# Patient Record
Sex: FEMALE | Race: WHITE | NOT HISPANIC OR LATINO | Employment: OTHER | URBAN - METROPOLITAN AREA
[De-identification: names, ages, dates, MRNs, and addresses within clinical notes are randomized per-mention and may not be internally consistent; named-entity substitution may affect disease eponyms.]

---

## 2017-02-08 ENCOUNTER — HISTORICAL (OUTPATIENT)
Dept: ADMINISTRATIVE | Facility: HOSPITAL | Age: 62
End: 2017-02-08

## 2017-05-19 ENCOUNTER — HISTORICAL (OUTPATIENT)
Dept: ADMINISTRATIVE | Facility: HOSPITAL | Age: 62
End: 2017-05-19

## 2017-05-19 LAB — TSH SERPL-ACNC: 3.65 MIU/ML (ref 0.36–3.74)

## 2017-09-18 ENCOUNTER — HISTORICAL (OUTPATIENT)
Dept: ADMINISTRATIVE | Facility: HOSPITAL | Age: 62
End: 2017-09-18

## 2017-09-18 LAB
ALBUMIN SERPL-MCNC: 3.5 GM/DL (ref 3.4–5)
ALBUMIN/GLOB SERPL: 1.1 {RATIO}
ALP SERPL-CCNC: 73 UNIT/L (ref 38–126)
ALT SERPL-CCNC: 16 UNIT/L (ref 12–78)
AST SERPL-CCNC: 14 UNIT/L (ref 15–37)
BILIRUB SERPL-MCNC: 0.3 MG/DL (ref 0.2–1)
BILIRUBIN DIRECT+TOT PNL SERPL-MCNC: 0.1 MG/DL (ref 0–0.2)
BILIRUBIN DIRECT+TOT PNL SERPL-MCNC: 0.2 MG/DL (ref 0–0.8)
BUN SERPL-MCNC: 14 MG/DL (ref 7–18)
CALCIUM SERPL-MCNC: 9 MG/DL (ref 8.5–10.1)
CHLORIDE SERPL-SCNC: 105 MMOL/L (ref 98–107)
CHOLEST SERPL-MCNC: 211 MG/DL (ref 0–200)
CHOLEST/HDLC SERPL: 2.4 {RATIO} (ref 0–4)
CO2 SERPL-SCNC: 32 MMOL/L (ref 21–32)
CREAT SERPL-MCNC: 0.7 MG/DL (ref 0.55–1.02)
DEPRECATED CALCIDIOL+CALCIFEROL SERPL-MC: 34.17 NG/ML (ref 30–80)
GLOBULIN SER-MCNC: 3.3 GM/DL (ref 2.4–3.5)
GLUCOSE SERPL-MCNC: 90 MG/DL (ref 74–106)
HDLC SERPL-MCNC: 87 MG/DL (ref 35–60)
LDLC SERPL CALC-MCNC: 112 MG/DL (ref 0–129)
POTASSIUM SERPL-SCNC: 4.6 MMOL/L (ref 3.5–5.1)
PROT SERPL-MCNC: 6.8 GM/DL (ref 6.4–8.2)
SODIUM SERPL-SCNC: 142 MMOL/L (ref 136–145)
TRIGL SERPL-MCNC: 58 MG/DL (ref 30–150)
TSH SERPL-ACNC: 3.37 MIU/ML (ref 0.36–3.74)
VLDLC SERPL CALC-MCNC: 12 MG/DL

## 2017-10-11 ENCOUNTER — HISTORICAL (OUTPATIENT)
Dept: RADIOLOGY | Facility: HOSPITAL | Age: 62
End: 2017-10-11

## 2018-03-20 ENCOUNTER — HISTORICAL (OUTPATIENT)
Dept: ADMINISTRATIVE | Facility: HOSPITAL | Age: 63
End: 2018-03-20

## 2018-03-20 LAB
CHOLEST SERPL-MCNC: 205 MG/DL (ref 0–200)
CHOLEST/HDLC SERPL: 2.1 {RATIO} (ref 0–4)
HDLC SERPL-MCNC: 99 MG/DL (ref 35–60)
LDLC SERPL CALC-MCNC: 94 MG/DL (ref 0–129)
TRIGL SERPL-MCNC: 60 MG/DL (ref 30–150)
TSH SERPL-ACNC: 1.94 MIU/ML (ref 0.36–3.74)
VLDLC SERPL CALC-MCNC: 12 MG/DL

## 2020-11-11 ENCOUNTER — HISTORICAL (OUTPATIENT)
Dept: ADMINISTRATIVE | Facility: HOSPITAL | Age: 65
End: 2020-11-11

## 2021-09-10 ENCOUNTER — HISTORICAL (OUTPATIENT)
Dept: CARDIOLOGY | Facility: HOSPITAL | Age: 66
End: 2021-09-10

## 2021-09-10 LAB
ABS NEUT (OLG): 5.54 X10(3)/MCL (ref 2.1–9.2)
BASOPHILS # BLD AUTO: 0 X10(3)/MCL (ref 0–0.2)
BASOPHILS NFR BLD AUTO: 0 %
BUN SERPL-MCNC: 15.6 MG/DL (ref 9.8–20.1)
CALCIUM SERPL-MCNC: 8.9 MG/DL (ref 8.4–10.2)
CHLORIDE SERPL-SCNC: 103 MMOL/L (ref 98–107)
CO2 SERPL-SCNC: 30 MMOL/L (ref 23–31)
CREAT SERPL-MCNC: 0.69 MG/DL (ref 0.55–1.02)
CREAT/UREA NIT SERPL: 23
EOSINOPHIL # BLD AUTO: 0.1 X10(3)/MCL (ref 0–0.9)
EOSINOPHIL NFR BLD AUTO: 2 %
ERYTHROCYTE [DISTWIDTH] IN BLOOD BY AUTOMATED COUNT: 15.1 % (ref 11.5–17)
EST CREAT CLEARANCE SER (OHS): 63.16 ML/MIN
GLUCOSE SERPL-MCNC: 102 MG/DL (ref 82–115)
HCT VFR BLD AUTO: 38.6 % (ref 37–47)
HGB BLD-MCNC: 12.3 GM/DL (ref 12–16)
LYMPHOCYTES # BLD AUTO: 1.3 X10(3)/MCL (ref 0.6–4.6)
LYMPHOCYTES NFR BLD AUTO: 18 %
MCH RBC QN AUTO: 27.8 PG (ref 27–31)
MCHC RBC AUTO-ENTMCNC: 31.9 GM/DL (ref 33–36)
MCV RBC AUTO: 87.3 FL (ref 80–94)
MONOCYTES # BLD AUTO: 0.5 X10(3)/MCL (ref 0.1–1.3)
MONOCYTES NFR BLD AUTO: 6 %
NEUTROPHILS # BLD AUTO: 5.54 X10(3)/MCL (ref 2.1–9.2)
NEUTROPHILS NFR BLD AUTO: 74 %
PLATELET # BLD AUTO: 280 X10(3)/MCL (ref 130–400)
PMV BLD AUTO: 9.4 FL (ref 9.4–12.4)
POTASSIUM SERPL-SCNC: 3.8 MMOL/L (ref 3.5–5.1)
RBC # BLD AUTO: 4.42 X10(6)/MCL (ref 4.2–5.4)
SODIUM SERPL-SCNC: 142 MMOL/L (ref 136–145)
WBC # SPEC AUTO: 7.5 X10(3)/MCL (ref 4.5–11.5)

## 2021-12-09 ENCOUNTER — HISTORICAL (OUTPATIENT)
Dept: CARDIOLOGY | Facility: HOSPITAL | Age: 66
End: 2021-12-09

## 2022-04-10 ENCOUNTER — HISTORICAL (OUTPATIENT)
Dept: ADMINISTRATIVE | Facility: HOSPITAL | Age: 67
End: 2022-04-10

## 2022-04-27 VITALS
HEIGHT: 62 IN | SYSTOLIC BLOOD PRESSURE: 139 MMHG | WEIGHT: 126.56 LBS | BODY MASS INDEX: 23.29 KG/M2 | DIASTOLIC BLOOD PRESSURE: 80 MMHG

## 2022-07-02 ENCOUNTER — HOSPITAL ENCOUNTER (INPATIENT)
Facility: HOSPITAL | Age: 67
LOS: 12 days | Discharge: NURSING, REHAB OR LONG-TERM CARE | DRG: 177 | End: 2022-07-14
Attending: EMERGENCY MEDICINE | Admitting: INTERNAL MEDICINE
Payer: MEDICARE

## 2022-07-02 DIAGNOSIS — D64.9 SYMPTOMATIC ANEMIA: ICD-10-CM

## 2022-07-02 DIAGNOSIS — U07.1 COVID-19: ICD-10-CM

## 2022-07-02 DIAGNOSIS — R06.02 SHORTNESS OF BREATH: ICD-10-CM

## 2022-07-02 DIAGNOSIS — T82.9XXA DIALYSIS COMPLICATION: ICD-10-CM

## 2022-07-02 DIAGNOSIS — N17.9 ACUTE RENAL FAILURE: Primary | ICD-10-CM

## 2022-07-02 DIAGNOSIS — N17.9 AKI (ACUTE KIDNEY INJURY): ICD-10-CM

## 2022-07-02 DIAGNOSIS — N18.6 END STAGE RENAL DISEASE: ICD-10-CM

## 2022-07-02 LAB
ABO + RH BLD: NORMAL
ABORH RETYPE: NORMAL
ALBUMIN SERPL-MCNC: 2.7 GM/DL (ref 3.4–4.8)
ALBUMIN/GLOB SERPL: 0.8 RATIO (ref 1.1–2)
ALP SERPL-CCNC: 72 UNIT/L (ref 40–150)
ALT SERPL-CCNC: 63 UNIT/L (ref 0–55)
APTT PPP: 34.5 SECONDS (ref 23.2–33.7)
AST SERPL-CCNC: 65 UNIT/L (ref 5–34)
BASOPHILS # BLD AUTO: 0.01 X10(3)/MCL (ref 0–0.2)
BASOPHILS NFR BLD AUTO: 0.1 %
BILIRUBIN DIRECT+TOT PNL SERPL-MCNC: 0.6 MG/DL
BLD PROD TYP BPU: NORMAL
BLOOD UNIT EXPIRATION DATE: NORMAL
BLOOD UNIT TYPE CODE: 7300
BNP BLD-MCNC: 226.1 PG/ML
BUN SERPL-MCNC: 95.6 MG/DL (ref 9.8–20.1)
CALCIUM SERPL-MCNC: 8.3 MG/DL (ref 8.4–10.2)
CHLORIDE SERPL-SCNC: 98 MMOL/L (ref 98–107)
CO2 SERPL-SCNC: 20 MMOL/L (ref 23–31)
CORRECTED TEMPERATURE (PCO2): 41 MMHG (ref 35–45)
CORRECTED TEMPERATURE (PH): 7.29 (ref 7.35–7.45)
CORRECTED TEMPERATURE (PO2): 52 MMHG
CREAT SERPL-MCNC: 17.52 MG/DL (ref 0.55–1.02)
CROSSMATCH INTERPRETATION: NORMAL
DISPENSE STATUS: NORMAL
EOSINOPHIL # BLD AUTO: 0.01 X10(3)/MCL (ref 0–0.9)
EOSINOPHIL NFR BLD AUTO: 0.1 %
ERYTHROCYTE [DISTWIDTH] IN BLOOD BY AUTOMATED COUNT: 16.1 % (ref 11.5–17)
FERRITIN SERPL-MCNC: 115.13 NG/ML (ref 4.63–204)
FLUAV AG UPPER RESP QL IA.RAPID: NOT DETECTED
FLUBV AG UPPER RESP QL IA.RAPID: NOT DETECTED
GLOBULIN SER-MCNC: 3.4 GM/DL (ref 2.4–3.5)
GLUCOSE SERPL-MCNC: 147 MG/DL (ref 82–115)
GROUP & RH: NORMAL
HBV SURFACE AG SERPL QL IA: NONREACTIVE
HCO3 UR-SCNC: 19.7 MMOL/L
HCT VFR BLD AUTO: 25.5 % (ref 37–47)
HGB BLD-MCNC: 7.9 GM/DL (ref 12–16)
HGB BLD-MCNC: 8.3 G/DL (ref 12–16)
IMM GRANULOCYTES # BLD AUTO: 0.08 X10(3)/MCL (ref 0–0.04)
IMM GRANULOCYTES NFR BLD AUTO: 1 %
INDIRECT COOMBS GEL: NORMAL
INR BLD: 0.96 (ref 0–1.3)
IRON SATN MFR SERPL: 11 % (ref 20–50)
IRON SERPL-MCNC: 28 UG/DL (ref 50–170)
LYMPHOCYTES # BLD AUTO: 0.63 X10(3)/MCL (ref 0.6–4.6)
LYMPHOCYTES NFR BLD AUTO: 7.7 %
MAGNESIUM SERPL-MCNC: 2.3 MG/DL (ref 1.6–2.6)
MCH RBC QN AUTO: 26.9 PG (ref 27–31)
MCHC RBC AUTO-ENTMCNC: 31 MG/DL (ref 33–36)
MCV RBC AUTO: 86.7 FL (ref 80–94)
MONOCYTES # BLD AUTO: 0.44 X10(3)/MCL (ref 0.1–1.3)
MONOCYTES NFR BLD AUTO: 5.4 %
NEUTROPHILS # BLD AUTO: 7 X10(3)/MCL (ref 2.1–9.2)
NEUTROPHILS NFR BLD AUTO: 85.7 %
NRBC BLD AUTO-RTO: 0 %
PCO2 BLDA: 41 MMHG (ref 35–45)
PH SMN: 7.29 [PH] (ref 7.35–7.45)
PLATELET # BLD AUTO: 311 X10(3)/MCL (ref 130–400)
PMV BLD AUTO: 10.1 FL (ref 7.4–10.4)
PO2 BLDA: 52 MMHG
POC BASE DEFICIT: -6.4 MMOL/L (ref -2–2)
POC COHB: 1.6 %
POC IONIZED CALCIUM: 1.06 MMOL/L (ref 1.12–1.23)
POC METHB: 0.7 % (ref 0.4–2)
POC O2HB: 80.8 % (ref 94–97)
POC SATURATED O2: 81.8 %
POC TEMPERATURE: 37 °C
POTASSIUM BLD-SCNC: 2.9 MMOL/L (ref 3.5–5)
POTASSIUM SERPL-SCNC: 3.8 MMOL/L (ref 3.5–5.1)
PROT SERPL-MCNC: 6.1 GM/DL (ref 5.8–7.6)
PROTHROMBIN TIME: 12.7 SECONDS (ref 12.5–14.5)
RBC # BLD AUTO: 2.94 X10(6)/MCL (ref 4.2–5.4)
RET# (OHS): 0.03 (ref 0.02–0.08)
RETICULOCYTE COUNT AUTOMATED (OLG): 0.91 % (ref 1.1–2.1)
SARS-COV-2 RNA RESP QL NAA+PROBE: DETECTED
SODIUM BLD-SCNC: 133 MMOL/L (ref 137–145)
SODIUM SERPL-SCNC: 138 MMOL/L (ref 136–145)
SPECIMEN SOURCE: ABNORMAL
T4 FREE SERPL-MCNC: 0.68 NG/DL (ref 0.7–1.48)
TIBC SERPL-MCNC: 230 UG/DL (ref 70–310)
TIBC SERPL-MCNC: 258 UG/DL (ref 250–450)
TRANSFERRIN SERPL-MCNC: 240 MG/DL (ref 173–360)
TROPONIN I SERPL-MCNC: 0.09 NG/ML (ref 0–0.04)
TSH SERPL-ACNC: 8.74 UIU/ML (ref 0.35–4.94)
UNIT NUMBER: NORMAL
WBC # SPEC AUTO: 8.2 X10(3)/MCL (ref 4.5–11.5)

## 2022-07-02 PROCEDURE — 36415 COLL VENOUS BLD VENIPUNCTURE: CPT | Performed by: EMERGENCY MEDICINE

## 2022-07-02 PROCEDURE — 99291 CRITICAL CARE FIRST HOUR: CPT | Mod: 25

## 2022-07-02 PROCEDURE — 93005 ELECTROCARDIOGRAM TRACING: CPT

## 2022-07-02 PROCEDURE — 85730 THROMBOPLASTIN TIME PARTIAL: CPT | Performed by: PHYSICIAN ASSISTANT

## 2022-07-02 PROCEDURE — 36556 INSERT NON-TUNNEL CV CATH: CPT | Mod: RT

## 2022-07-02 PROCEDURE — 99900035 HC TECH TIME PER 15 MIN (STAT)

## 2022-07-02 PROCEDURE — 84439 ASSAY OF FREE THYROXINE: CPT | Performed by: PHYSICIAN ASSISTANT

## 2022-07-02 PROCEDURE — 83880 ASSAY OF NATRIURETIC PEPTIDE: CPT | Performed by: PHYSICIAN ASSISTANT

## 2022-07-02 PROCEDURE — P9016 RBC LEUKOCYTES REDUCED: HCPCS | Performed by: EMERGENCY MEDICINE

## 2022-07-02 PROCEDURE — 27000221 HC OXYGEN, UP TO 24 HOURS

## 2022-07-02 PROCEDURE — 25000242 PHARM REV CODE 250 ALT 637 W/ HCPCS: Performed by: EMERGENCY MEDICINE

## 2022-07-02 PROCEDURE — S5010 5% DEXTROSE AND 0.45% SALINE: HCPCS | Performed by: EMERGENCY MEDICINE

## 2022-07-02 PROCEDURE — 11000001 HC ACUTE MED/SURG PRIVATE ROOM

## 2022-07-02 PROCEDURE — 36415 COLL VENOUS BLD VENIPUNCTURE: CPT | Performed by: PHYSICIAN ASSISTANT

## 2022-07-02 PROCEDURE — 93010 ELECTROCARDIOGRAM REPORT: CPT | Mod: ,,, | Performed by: INTERNAL MEDICINE

## 2022-07-02 PROCEDURE — 36600 WITHDRAWAL OF ARTERIAL BLOOD: CPT

## 2022-07-02 PROCEDURE — 93010 EKG 12-LEAD: ICD-10-PCS | Mod: ,,, | Performed by: INTERNAL MEDICINE

## 2022-07-02 PROCEDURE — 83540 ASSAY OF IRON: CPT | Performed by: NURSE PRACTITIONER

## 2022-07-02 PROCEDURE — 84484 ASSAY OF TROPONIN QUANT: CPT | Performed by: PHYSICIAN ASSISTANT

## 2022-07-02 PROCEDURE — 85610 PROTHROMBIN TIME: CPT | Performed by: PHYSICIAN ASSISTANT

## 2022-07-02 PROCEDURE — 86850 RBC ANTIBODY SCREEN: CPT | Performed by: EMERGENCY MEDICINE

## 2022-07-02 PROCEDURE — 94640 AIRWAY INHALATION TREATMENT: CPT

## 2022-07-02 PROCEDURE — 82803 BLOOD GASES ANY COMBINATION: CPT

## 2022-07-02 PROCEDURE — 27000207 HC ISOLATION

## 2022-07-02 PROCEDURE — 99900031 HC PATIENT EDUCATION (STAT)

## 2022-07-02 PROCEDURE — 63600175 PHARM REV CODE 636 W HCPCS: Performed by: EMERGENCY MEDICINE

## 2022-07-02 PROCEDURE — 87636 SARSCOV2 & INF A&B AMP PRB: CPT | Performed by: PHYSICIAN ASSISTANT

## 2022-07-02 PROCEDURE — 36430 TRANSFUSION BLD/BLD COMPNT: CPT

## 2022-07-02 PROCEDURE — 25000003 PHARM REV CODE 250: Performed by: EMERGENCY MEDICINE

## 2022-07-02 PROCEDURE — 87340 HEPATITIS B SURFACE AG IA: CPT | Performed by: INTERNAL MEDICINE

## 2022-07-02 PROCEDURE — 85025 COMPLETE CBC W/AUTO DIFF WBC: CPT | Performed by: PHYSICIAN ASSISTANT

## 2022-07-02 PROCEDURE — 85045 AUTOMATED RETICULOCYTE COUNT: CPT | Performed by: NURSE PRACTITIONER

## 2022-07-02 PROCEDURE — 82728 ASSAY OF FERRITIN: CPT | Performed by: NURSE PRACTITIONER

## 2022-07-02 PROCEDURE — 86920 COMPATIBILITY TEST SPIN: CPT | Performed by: EMERGENCY MEDICINE

## 2022-07-02 PROCEDURE — 84443 ASSAY THYROID STIM HORMONE: CPT | Performed by: PHYSICIAN ASSISTANT

## 2022-07-02 PROCEDURE — 83735 ASSAY OF MAGNESIUM: CPT | Performed by: PHYSICIAN ASSISTANT

## 2022-07-02 PROCEDURE — 80053 COMPREHEN METABOLIC PANEL: CPT | Performed by: PHYSICIAN ASSISTANT

## 2022-07-02 RX ORDER — AMLODIPINE BESYLATE 5 MG/1
5 TABLET ORAL DAILY
COMMUNITY
Start: 2022-06-09 | End: 2022-08-03

## 2022-07-02 RX ORDER — FLUTICASONE FUROATE AND VILANTEROL 100; 25 UG/1; UG/1
1 POWDER RESPIRATORY (INHALATION) DAILY
Status: DISCONTINUED | OUTPATIENT
Start: 2022-07-03 | End: 2022-07-14 | Stop reason: HOSPADM

## 2022-07-02 RX ORDER — SODIUM CHLORIDE 0.9 % (FLUSH) 0.9 %
10 SYRINGE (ML) INJECTION
Status: DISCONTINUED | OUTPATIENT
Start: 2022-07-02 | End: 2022-07-14 | Stop reason: HOSPADM

## 2022-07-02 RX ORDER — FLUTICASONE PROPIONATE AND SALMETEROL 250; 50 UG/1; UG/1
1 POWDER RESPIRATORY (INHALATION)
COMMUNITY

## 2022-07-02 RX ORDER — LEVOTHYROXINE SODIUM 75 UG/1
75 TABLET ORAL DAILY
COMMUNITY
Start: 2022-06-17

## 2022-07-02 RX ORDER — LIDOCAINE HYDROCHLORIDE 20 MG/ML
INJECTION, SOLUTION INFILTRATION; PERINEURAL
Status: DISPENSED
Start: 2022-07-02 | End: 2022-07-03

## 2022-07-02 RX ORDER — IPRATROPIUM BROMIDE AND ALBUTEROL SULFATE 2.5; .5 MG/3ML; MG/3ML
3 SOLUTION RESPIRATORY (INHALATION)
Status: COMPLETED | OUTPATIENT
Start: 2022-07-02 | End: 2022-07-02

## 2022-07-02 RX ORDER — TALC
6 POWDER (GRAM) TOPICAL NIGHTLY PRN
Status: DISCONTINUED | OUTPATIENT
Start: 2022-07-02 | End: 2022-07-14 | Stop reason: HOSPADM

## 2022-07-02 RX ORDER — DIAZEPAM 5 MG/1
TABLET ORAL
COMMUNITY
Start: 2022-05-10

## 2022-07-02 RX ORDER — LIDOCAINE HYDROCHLORIDE 20 MG/ML
5 INJECTION, SOLUTION EPIDURAL; INFILTRATION; INTRACAUDAL; PERINEURAL
Status: ACTIVE | OUTPATIENT
Start: 2022-07-02 | End: 2022-07-03

## 2022-07-02 RX ORDER — TIOTROPIUM BROMIDE 18 UG/1
1 CAPSULE ORAL; RESPIRATORY (INHALATION) DAILY
COMMUNITY
Start: 2022-06-17

## 2022-07-02 RX ORDER — ONDANSETRON 2 MG/ML
4 INJECTION INTRAMUSCULAR; INTRAVENOUS EVERY 8 HOURS PRN
Status: DISCONTINUED | OUTPATIENT
Start: 2022-07-02 | End: 2022-07-14 | Stop reason: HOSPADM

## 2022-07-02 RX ORDER — ALBUTEROL SULFATE 90 UG/1
1 AEROSOL, METERED RESPIRATORY (INHALATION) EVERY 6 HOURS PRN
Status: DISCONTINUED | OUTPATIENT
Start: 2022-07-02 | End: 2022-07-03

## 2022-07-02 RX ORDER — APIXABAN 5 MG/1
5 TABLET, FILM COATED ORAL 2 TIMES DAILY
COMMUNITY
Start: 2022-04-23

## 2022-07-02 RX ORDER — LISINOPRIL 20 MG/1
20 TABLET ORAL DAILY
COMMUNITY
Start: 2022-06-25

## 2022-07-02 RX ORDER — DEXAMETHASONE SODIUM PHOSPHATE 4 MG/ML
10 INJECTION, SOLUTION INTRA-ARTICULAR; INTRALESIONAL; INTRAMUSCULAR; INTRAVENOUS; SOFT TISSUE ONCE
Status: COMPLETED | OUTPATIENT
Start: 2022-07-02 | End: 2022-07-02

## 2022-07-02 RX ORDER — BUPROPION HYDROCHLORIDE 150 MG/1
150 TABLET, EXTENDED RELEASE ORAL 2 TIMES DAILY
COMMUNITY
Start: 2022-06-08

## 2022-07-02 RX ORDER — CHOLECALCIFEROL (VITAMIN D3) 1250 MCG
50000 CAPSULE ORAL
COMMUNITY
Start: 2022-05-29

## 2022-07-02 RX ORDER — ASPIRIN 81 MG/1
81 TABLET ORAL
COMMUNITY
End: 2022-08-09

## 2022-07-02 RX ORDER — CLOPIDOGREL BISULFATE 75 MG/1
75 TABLET ORAL DAILY
COMMUNITY
Start: 2022-04-09 | End: 2022-08-09

## 2022-07-02 RX ORDER — ROPINIROLE 3 MG/1
3 TABLET, FILM COATED ORAL DAILY
COMMUNITY
Start: 2022-06-07

## 2022-07-02 RX ORDER — PREDNISOLONE ACETATE 10 MG/ML
SUSPENSION/ DROPS OPHTHALMIC
Status: ON HOLD | COMMUNITY
Start: 2022-03-08 | End: 2022-07-11 | Stop reason: HOSPADM

## 2022-07-02 RX ORDER — HEPARIN SODIUM 5000 [USP'U]/ML
5000 INJECTION, SOLUTION INTRAVENOUS; SUBCUTANEOUS EVERY 8 HOURS
Status: DISCONTINUED | OUTPATIENT
Start: 2022-07-02 | End: 2022-07-03

## 2022-07-02 RX ORDER — FUROSEMIDE 20 MG/1
TABLET ORAL DAILY
COMMUNITY
Start: 2022-06-09

## 2022-07-02 RX ORDER — ROSUVASTATIN CALCIUM 40 MG/1
40 TABLET, COATED ORAL NIGHTLY
COMMUNITY
Start: 2022-06-09

## 2022-07-02 RX ORDER — BUDESONIDE AND FORMOTEROL FUMARATE DIHYDRATE 160; 4.5 UG/1; UG/1
AEROSOL RESPIRATORY (INHALATION)
COMMUNITY
Start: 2022-01-13

## 2022-07-02 RX ORDER — ALBUTEROL SULFATE 90 UG/1
AEROSOL, METERED RESPIRATORY (INHALATION)
COMMUNITY
Start: 2022-02-23

## 2022-07-02 RX ORDER — HYDROCODONE BITARTRATE AND ACETAMINOPHEN 500; 5 MG/1; MG/1
TABLET ORAL
Status: DISCONTINUED | OUTPATIENT
Start: 2022-07-02 | End: 2022-07-03

## 2022-07-02 RX ADMIN — IPRATROPIUM BROMIDE AND ALBUTEROL SULFATE 3 ML: .5; 2.5 SOLUTION RESPIRATORY (INHALATION) at 07:07

## 2022-07-02 RX ADMIN — SODIUM BICARBONATE: 84 INJECTION, SOLUTION INTRAVENOUS at 08:07

## 2022-07-02 RX ADMIN — DEXAMETHASONE SODIUM PHOSPHATE 10 MG: 4 INJECTION, SOLUTION INTRAMUSCULAR; INTRAVENOUS at 06:07

## 2022-07-02 NOTE — ED PROVIDER NOTES
Encounter Date: 7/2/2022       History   No chief complaint on file.    65 y/o F with hx HTN, HLD, COPD presents to the ED for evaluation of progressively worsening shortness of breath, generalized weakness, fatigue. Recently underwent lower extremity/venous procedure with CIS.     The history is provided by the patient and a relative.   Shortness of Breath  This is a new problem. The problem occurs continuously.The current episode started more than 2 days ago. The problem has been gradually worsening. Associated symptoms include cough and sputum production. Pertinent negatives include no fever, no chest pain, no vomiting and no abdominal pain. Associated medical issues include COPD.     Review of patient's allergies indicates:   Allergen Reactions    Oxycodone Hives     Past Medical History:   Diagnosis Date    COPD (chronic obstructive pulmonary disease)     Depression     HLD (hyperlipidemia)     Hypertension     PAD (peripheral artery disease)     PVD (peripheral vascular disease)     RLS (restless legs syndrome)      History reviewed. No pertinent surgical history.  History reviewed. No pertinent family history.     Review of Systems   Constitutional: Negative for fever.   Respiratory: Positive for cough, sputum production and shortness of breath.    Cardiovascular: Negative for chest pain.   Gastrointestinal: Negative for abdominal pain and vomiting.   All other systems reviewed and are negative.      Physical Exam     Initial Vitals [07/02/22 1448]   BP Pulse Resp Temp SpO2   (!) 154/70 81 (!) 22 97 °F (36.1 °C) (!) 92 %      MAP       --         Physical Exam    Nursing note and vitals reviewed.  Constitutional: She appears well-developed and well-nourished. She appears distressed.   HENT:   Head: Normocephalic.   Mouth/Throat: Oropharynx is clear and moist.   Eyes: Conjunctivae are normal. No scleral icterus.   Neck: Neck supple. No JVD present.   Normal range of motion.  Cardiovascular: Normal rate  and regular rhythm.   Pulmonary/Chest: She has wheezes. She has rhonchi.   Abdominal: Abdomen is soft. She exhibits no distension. There is no abdominal tenderness.   Musculoskeletal:      Cervical back: Normal range of motion and neck supple.     Neurological: She is alert and oriented to person, place, and time. GCS score is 15. GCS eye subscore is 4. GCS verbal subscore is 5. GCS motor subscore is 6.   Skin: Skin is warm and dry.         ED Course   Critical Care    Date/Time: 7/2/2022 5:00 PM  Performed by: Alvina Cristobal MD  Authorized by: Alvina Cristobal MD   Direct patient critical care time: 26 minutes  Additional history critical care time: 4 minutes  Ordering / reviewing critical care time: 6 minutes  Documentation critical care time: 5 minutes  Consulting other physicians critical care time: 4 minutes  Total critical care time (exclusive of procedural time) : 45 minutes  Critical care time was exclusive of separately billable procedures and treating other patients.  Critical care was necessary to treat or prevent imminent or life-threatening deterioration of the following conditions: metabolic crisis.  Critical care was time spent personally by me on the following activities: discussions with consultants, evaluation of patient's response to treatment, examination of patient, obtaining history from patient or surrogate, ordering and performing treatments and interventions, ordering and review of laboratory studies, ordering and review of radiographic studies, pulse oximetry and re-evaluation of patient's condition.        Labs Reviewed   COMPREHENSIVE METABOLIC PANEL - Abnormal; Notable for the following components:       Result Value    Carbon Dioxide 20 (*)     Glucose Level 147 (*)     Blood Urea Nitrogen 95.6 (*)     Creatinine 17.52 (*)     Calcium Level Total 8.3 (*)     Albumin Level 2.7 (*)     Albumin/Globulin Ratio 0.8 (*)     Alanine Aminotransferase 63 (*)     Aspartate Aminotransferase 65  (*)     All other components within normal limits   TSH - Abnormal; Notable for the following components:    Thyroid Stimulating Hormone 8.7402 (*)     All other components within normal limits   TROPONIN I - Abnormal; Notable for the following components:    Troponin-I 0.088 (*)     All other components within normal limits   T4, FREE - Abnormal; Notable for the following components:    Thyroxine Free 0.68 (*)     All other components within normal limits   B-TYPE NATRIURETIC PEPTIDE - Abnormal; Notable for the following components:    Natriuretic Peptide 226.1 (*)     All other components within normal limits   COVID/FLU A&B PCR - Abnormal; Notable for the following components:    SARS-CoV-2 PCR Detected (*)     All other components within normal limits   CBC WITH DIFFERENTIAL - Abnormal; Notable for the following components:    RBC 2.94 (*)     Hgb 7.9 (*)     Hct 25.5 (*)     MCH 26.9 (*)     MCHC 31.0 (*)     IG# 0.08 (*)     All other components within normal limits   APTT - Abnormal; Notable for the following components:    PTT 34.5 (*)     All other components within normal limits   MAGNESIUM - Normal   PROTIME-INR - Normal   CBC W/ AUTO DIFFERENTIAL    Narrative:     The following orders were created for panel order CBC Auto Differential.  Procedure                               Abnormality         Status                     ---------                               -----------         ------                     CBC with Differential[[id number]]        Abnormal            Final result                 Please view results for these tests on the individual orders.   URINALYSIS, REFLEX TO URINE CULTURE   HEPATITIS B SURFACE ANTIGEN   HEPATITIS B SURFACE ANTIBODY, QUAL/QUANT   TYPE & SCREEN   ABORH RETYPE   PREPARE RBC SOFT     EKG Readings: (Independently Interpreted)   Initial Reading: No STEMI. Rhythm: Atrial Fibrillation. Heart Rate: 75. ST Segments: Normal ST Segments. T Waves: Normal. Axis: Normal.    07/02/2022 @ 1456                US Retroperitoneal Complete (Final result)  Result time 07/02/22 19:31:48    Final result by Yan Henderson MD (07/02/22 19:31:48)                 Impression:        1. Findings suggestive of chronic bilateral medical renal disease.  2. Possible nonobstructing small right renal calcification.  3. Unremarkable sonographic appearance of the urinary bladder.      Electronically signed by: Yan Henderson  Date:    07/02/2022  Time:    19:31             Narrative:    EXAMINATION:  US RETROPERITONEAL COMPLETE    CLINICAL HISTORY:  ; acute renal failure;    TECHNIQUE:  Multiplanar grayscale sonographic images were obtained of the kidneys and urinary bladder.    COMPARISON:  11 April 2014    FINDINGS:  Exam quality: adequate for evaluation    Right Kidney: The kidney measures 13 cm x 5.5 cm.  There is diffusely heterogeneous and increased echogenicity of the renal parenchyma, suggestive of chronic medical renal disease.  No masses or complex cysts are appreciated.  No collecting system dilatation.  An echogenic focus measuring approximately 0.7 cm in maximal diameter is noted at the interpolar renal cortex, may represent calcification.  No shadowing stone is identified.  No perinephric collection or free abdominal fluid.    Left Kidney: The kidney measures 12 cm x 5.5 cm.  Diffusely heterogeneous and hyperechoic appearance of the renal tissue.  No masses or complex cysts are appreciated.  No collecting system dilatation. No shadowing calcification is identified.  No perinephric collection or free abdominal fluid.    Bladder: No convincing intraluminal abnormality.  No wall thickening or focal mural lesion.                                 X-Ray Chest 1 View (Final result)  Result time 07/02/22 15:23:43    Final result by Jacqueline jOeda MD (07/02/22 15:23:43)                 Impression:      Possible trace right pleural effusion    Basilar interstitial prominence may be related to  senescent change or volume status.      Electronically signed by: Jacqueline Ojeda  Date:    07/02/2022  Time:    15:23             Narrative:    EXAMINATION:  XR CHEST 1 VIEW    CLINICAL HISTORY:  Shortness of breath    TECHNIQUE:  Single frontal view of the chest was performed.    COMPARISON:  None    FINDINGS:  Lordotic radiograph obtained.    LINES AND TUBES: None    MEDIASTINUM AND AIMEE: The cardiac silhouette is normal. Aortic atherosclerosis. Mild prominence of the right hilum.    LUNGS: No lobar consolidation.Mild basilar interstitial prominence.      PLEURA:There is blunting of the right costophrenic sulcus.No pneumothorax.    BONES: No acute osseous abnormality.                                 Medications   0.9%  NaCl infusion (for blood administration) (has no administration in time range)   dexamethasone injection 10 mg (has no administration in time range)     Medical Decision Making:   Initial Assessment:   Ms. Bower presented for evaluation of worsening weakness, shortness of breath. Hx COPD and some wheezing on examination. Will obtain labx, COVID testing, CXR and provide symptomatic treatments.   Differential Diagnosis:   Viral syndrome, COVID-19, pneumonia, COPD with acute exacerbation, dehydration, electrolyte derangements  Independently Interpreted Test(s):   I have ordered and independently interpreted EKG Reading(s) - see prior notes  Clinical Tests:   Lab Tests: Ordered and Reviewed       <> Summary of Lab: SYLVESTER, metabolic acidosis, COVID-19+  Radiological Study: Ordered and Reviewed  Medical Tests: Ordered and Reviewed  ED Management:  COVID-19 positive. Severe acute renal failure w/ markedly elevated BUN/Cr, mild metabolic acidosis. Started on fluid resuscitation and nephrology consultation obtained. Per Dr. Rausch, started on IV bicarb gtt and requests temporary dialysis access placed tonight for possible HD tomorrow. Not a candidate for remdesivir given renal failure. Will be admitted to  hospital medicine service for further inpt management.   Other:   I have discussed this case with another health care provider.       <> Summary of the Discussion: Discussed with nephrology, Dr. Rausch: recommends bicarb gtt and temporary HD access placed tonigght for planned dialysis tomorrow    Discussed with surgery resident Dr. Miller, will place temporary dialysis access             ED Course as of 07/02/22 1843   Sat Jul 02, 2022   1738 Discussed with Dr. Rausch, rec D5 1/2 NS  w/ 2 amp bicarb @ 75 ml/hr [KS]      ED Course User Index  [KS] Alvina Cristobal MD             Clinical Impression:   Final diagnoses:  [R06.02] Shortness of breath  [N17.9] Acute renal failure (Primary)  [U07.1] COVID-19  Symptomatic anemia          ED Disposition Condition    Admit               Alvina Cristobal MD  07/05/22 1451       Alvina Cristobal MD  07/05/22 1459

## 2022-07-02 NOTE — FIRST PROVIDER EVALUATION
Medical screening exam completed.  I have conducted a focused provider triage encounter, findings are as follows:    Brief history of present illness:  66 year old female hx of a fib and copd presents to ED for confusion and shortness of breath; recent home oxygen placement 4 days ago     Vitals:    07/02/22 1448   BP: (!) 154/70   Pulse: 81   Resp: (!) 22   Temp: 97 °F (36.1 °C)   SpO2: (!) 92%       Pertinent physical exam:  Awake, alert     Brief workup plan:  Cbc, cmp, EKG, bnp, troponin, chest x-ray, covid/flu, pt/ptt, UA, mg, tsh/free t4     Preliminary workup initiated; this workup will be continued and followed by the physician or advanced practice provider that is assigned to the patient when roomed.

## 2022-07-02 NOTE — Clinical Note
The right chest and right neck was prepped. The site was prepped with ChloraPrep and Betadine. The patient was draped. The patient was positioned supine.

## 2022-07-02 NOTE — Clinical Note
The chest was prepped. The site was prepped with ChloraPrep. The patient was draped. The patient was positioned supine.

## 2022-07-02 NOTE — ED NOTES
COPD hx, placed on oxygen at home at 2liters, increased sob this week, pt is labored, increased RR with sob

## 2022-07-03 LAB
ALBUMIN SERPL-MCNC: 2.4 GM/DL (ref 3.4–4.8)
ALBUMIN/GLOB SERPL: 0.6 RATIO (ref 1.1–2)
ALP SERPL-CCNC: 66 UNIT/L (ref 40–150)
ALT SERPL-CCNC: 56 UNIT/L (ref 0–55)
APPEARANCE UR: CLEAR
AST SERPL-CCNC: 51 UNIT/L (ref 5–34)
BACTERIA #/AREA URNS AUTO: NORMAL /HPF
BILIRUB UR QL STRIP.AUTO: NEGATIVE MG/DL
BILIRUBIN DIRECT+TOT PNL SERPL-MCNC: 0.7 MG/DL
BUN SERPL-MCNC: 92.4 MG/DL (ref 9.8–20.1)
CALCIUM SERPL-MCNC: 8.3 MG/DL (ref 8.4–10.2)
CHLORIDE SERPL-SCNC: 98 MMOL/L (ref 98–107)
CK SERPL-CCNC: 607 U/L (ref 29–168)
CO2 SERPL-SCNC: 20 MMOL/L (ref 23–31)
COLOR UR AUTO: YELLOW
CREAT SERPL-MCNC: 16.37 MG/DL (ref 0.55–1.02)
CREAT UR-MCNC: 100 MG/DL (ref 47–110)
ERYTHROCYTE [SEDIMENTATION RATE] IN BLOOD: 94 MM/HR (ref 0–20)
GLOBULIN SER-MCNC: 3.9 GM/DL (ref 2.4–3.5)
GLUCOSE SERPL-MCNC: 166 MG/DL (ref 82–115)
GLUCOSE UR QL STRIP.AUTO: NEGATIVE MG/DL
KETONES UR QL STRIP.AUTO: NEGATIVE MG/DL
LACTATE SERPL-SCNC: 0.6 MMOL/L (ref 0.5–2.2)
LDH SERPL-CCNC: 449 U/L (ref 125–220)
LEUKOCYTE ESTERASE UR QL STRIP.AUTO: ABNORMAL UNIT/L
NITRITE UR QL STRIP.AUTO: NEGATIVE
PH UR STRIP.AUTO: 5.5 [PH]
POTASSIUM SERPL-SCNC: 3.5 MMOL/L (ref 3.5–5.1)
PROT SERPL-MCNC: 6.3 GM/DL (ref 5.8–7.6)
PROT UR QL STRIP.AUTO: ABNORMAL MG/DL
PROT UR STRIP-MCNC: 104.7 MG/DL
PROT/CREAT UR-RTO: 1047 MG/GM CR
RBC #/AREA URNS AUTO: <5 /HPF
RBC UR QL AUTO: ABNORMAL UNIT/L
SODIUM SERPL-SCNC: 135 MMOL/L (ref 136–145)
SODIUM UR-SCNC: 51 MMOL/L
SP GR UR STRIP.AUTO: 1.01 (ref 1–1.03)
SQUAMOUS #/AREA URNS AUTO: <5 /HPF
UROBILINOGEN UR STRIP-ACNC: 0.2 MG/DL
WBC #/AREA URNS AUTO: <5 /HPF

## 2022-07-03 PROCEDURE — 80053 COMPREHEN METABOLIC PANEL: CPT | Performed by: EMERGENCY MEDICINE

## 2022-07-03 PROCEDURE — 84300 ASSAY OF URINE SODIUM: CPT | Performed by: INTERNAL MEDICINE

## 2022-07-03 PROCEDURE — 25000242 PHARM REV CODE 250 ALT 637 W/ HCPCS: Performed by: INTERNAL MEDICINE

## 2022-07-03 PROCEDURE — 85651 RBC SED RATE NONAUTOMATED: CPT | Performed by: NURSE PRACTITIONER

## 2022-07-03 PROCEDURE — 83605 ASSAY OF LACTIC ACID: CPT | Performed by: NURSE PRACTITIONER

## 2022-07-03 PROCEDURE — 83615 LACTATE (LD) (LDH) ENZYME: CPT | Performed by: NURSE PRACTITIONER

## 2022-07-03 PROCEDURE — 80100016 HC MAINTENANCE HEMODIALYSIS

## 2022-07-03 PROCEDURE — 63600175 PHARM REV CODE 636 W HCPCS: Performed by: EMERGENCY MEDICINE

## 2022-07-03 PROCEDURE — 82550 ASSAY OF CK (CPK): CPT | Performed by: NURSE PRACTITIONER

## 2022-07-03 PROCEDURE — 25000003 PHARM REV CODE 250: Performed by: NURSE PRACTITIONER

## 2022-07-03 PROCEDURE — 63600175 PHARM REV CODE 636 W HCPCS

## 2022-07-03 PROCEDURE — 25000003 PHARM REV CODE 250: Performed by: INTERNAL MEDICINE

## 2022-07-03 PROCEDURE — 81001 URINALYSIS AUTO W/SCOPE: CPT | Performed by: PHYSICIAN ASSISTANT

## 2022-07-03 PROCEDURE — 82570 ASSAY OF URINE CREATININE: CPT | Performed by: INTERNAL MEDICINE

## 2022-07-03 PROCEDURE — 11000001 HC ACUTE MED/SURG PRIVATE ROOM

## 2022-07-03 PROCEDURE — 27000207 HC ISOLATION

## 2022-07-03 PROCEDURE — 27000221 HC OXYGEN, UP TO 24 HOURS

## 2022-07-03 PROCEDURE — 36415 COLL VENOUS BLD VENIPUNCTURE: CPT | Performed by: EMERGENCY MEDICINE

## 2022-07-03 PROCEDURE — 36415 COLL VENOUS BLD VENIPUNCTURE: CPT | Performed by: NURSE PRACTITIONER

## 2022-07-03 PROCEDURE — 63600175 PHARM REV CODE 636 W HCPCS: Performed by: STUDENT IN AN ORGANIZED HEALTH CARE EDUCATION/TRAINING PROGRAM

## 2022-07-03 PROCEDURE — 94640 AIRWAY INHALATION TREATMENT: CPT

## 2022-07-03 PROCEDURE — 63600175 PHARM REV CODE 636 W HCPCS: Performed by: NURSE PRACTITIONER

## 2022-07-03 RX ORDER — SODIUM CHLORIDE 9 MG/ML
INJECTION, SOLUTION INTRAVENOUS
Status: CANCELLED | OUTPATIENT
Start: 2022-07-03

## 2022-07-03 RX ORDER — ROPINIROLE 1 MG/1
1 TABLET, FILM COATED ORAL DAILY
Status: DISCONTINUED | OUTPATIENT
Start: 2022-07-04 | End: 2022-07-14 | Stop reason: HOSPADM

## 2022-07-03 RX ORDER — HEPARIN SODIUM 5000 [USP'U]/ML
5000 INJECTION, SOLUTION INTRAVENOUS; SUBCUTANEOUS
Status: DISCONTINUED | OUTPATIENT
Start: 2022-07-03 | End: 2022-07-14 | Stop reason: HOSPADM

## 2022-07-03 RX ORDER — ALBUTEROL SULFATE 90 UG/1
2 AEROSOL, METERED RESPIRATORY (INHALATION) EVERY 6 HOURS
Status: DISCONTINUED | OUTPATIENT
Start: 2022-07-03 | End: 2022-07-14 | Stop reason: HOSPADM

## 2022-07-03 RX ORDER — HEPARIN SODIUM 1000 [USP'U]/ML
1100 INJECTION, SOLUTION INTRAVENOUS; SUBCUTANEOUS
Status: DISCONTINUED | OUTPATIENT
Start: 2022-07-03 | End: 2022-07-03

## 2022-07-03 RX ORDER — ACETAMINOPHEN 325 MG/1
650 TABLET ORAL EVERY 8 HOURS PRN
Status: DISCONTINUED | OUTPATIENT
Start: 2022-07-03 | End: 2022-07-14 | Stop reason: HOSPADM

## 2022-07-03 RX ORDER — SODIUM CHLORIDE 450 MG/100ML
INJECTION, SOLUTION INTRAVENOUS CONTINUOUS
Status: DISCONTINUED | OUTPATIENT
Start: 2022-07-03 | End: 2022-07-03

## 2022-07-03 RX ORDER — ROPINIROLE 1 MG/1
3 TABLET, FILM COATED ORAL 3 TIMES DAILY
Status: DISCONTINUED | OUTPATIENT
Start: 2022-07-03 | End: 2022-07-03

## 2022-07-03 RX ORDER — UBIDECARENONE 30 MG
100 CAPSULE ORAL DAILY
COMMUNITY

## 2022-07-03 RX ORDER — HEPARIN 100 UNIT/ML
SYRINGE INTRAVENOUS
Status: DISCONTINUED
Start: 2022-07-03 | End: 2022-07-03 | Stop reason: WASHOUT

## 2022-07-03 RX ORDER — BENZONATATE 100 MG/1
100 CAPSULE ORAL 3 TIMES DAILY PRN
Status: DISCONTINUED | OUTPATIENT
Start: 2022-07-03 | End: 2022-07-14 | Stop reason: HOSPADM

## 2022-07-03 RX ORDER — IPRATROPIUM BROMIDE AND ALBUTEROL SULFATE 2.5; .5 MG/3ML; MG/3ML
3 SOLUTION RESPIRATORY (INHALATION)
Status: COMPLETED | OUTPATIENT
Start: 2022-07-03 | End: 2022-07-03

## 2022-07-03 RX ORDER — ROPINIROLE 1 MG/1
1 TABLET, FILM COATED ORAL 3 TIMES DAILY
Status: DISCONTINUED | OUTPATIENT
Start: 2022-07-03 | End: 2022-07-03

## 2022-07-03 RX ORDER — ROPINIROLE 1 MG/1
3 TABLET, FILM COATED ORAL NIGHTLY
Status: DISCONTINUED | OUTPATIENT
Start: 2022-07-03 | End: 2022-07-08

## 2022-07-03 RX ORDER — ROPINIROLE 1 MG/1
1 TABLET, FILM COATED ORAL DAILY
COMMUNITY

## 2022-07-03 RX ORDER — MUPIROCIN 20 MG/G
OINTMENT TOPICAL 2 TIMES DAILY
Status: DISPENSED | OUTPATIENT
Start: 2022-07-03 | End: 2022-07-08

## 2022-07-03 RX ORDER — ASCORBIC ACID 500 MG
500 TABLET ORAL 2 TIMES DAILY
Status: DISCONTINUED | OUTPATIENT
Start: 2022-07-03 | End: 2022-07-14 | Stop reason: HOSPADM

## 2022-07-03 RX ORDER — HEPARIN 100 UNIT/ML
1100 SYRINGE INTRAVENOUS ONCE
Status: DISCONTINUED | OUTPATIENT
Start: 2022-07-03 | End: 2022-07-03

## 2022-07-03 RX ORDER — LEVOFLOXACIN 500 MG/1
500 TABLET, FILM COATED ORAL DAILY
Status: COMPLETED | OUTPATIENT
Start: 2022-07-03 | End: 2022-07-07

## 2022-07-03 RX ORDER — ROPINIROLE 1 MG/1
3 TABLET, FILM COATED ORAL
Status: COMPLETED | OUTPATIENT
Start: 2022-07-03 | End: 2022-07-03

## 2022-07-03 RX ORDER — ENOXAPARIN SODIUM 100 MG/ML
1 INJECTION SUBCUTANEOUS 2 TIMES DAILY
Status: DISCONTINUED | OUTPATIENT
Start: 2022-07-03 | End: 2022-07-03

## 2022-07-03 RX ORDER — HEPARIN 100 UNIT/ML
280 SYRINGE INTRAVENOUS ONCE
Status: COMPLETED | OUTPATIENT
Start: 2022-07-03 | End: 2022-07-03

## 2022-07-03 RX ORDER — SODIUM CHLORIDE 9 MG/ML
INJECTION, SOLUTION INTRAVENOUS ONCE
Status: CANCELLED | OUTPATIENT
Start: 2022-07-03 | End: 2022-07-03

## 2022-07-03 RX ADMIN — OXYCODONE HYDROCHLORIDE AND ACETAMINOPHEN 500 MG: 500 TABLET ORAL at 10:07

## 2022-07-03 RX ADMIN — MUPIROCIN: 20 OINTMENT TOPICAL at 10:07

## 2022-07-03 RX ADMIN — IPRATROPIUM BROMIDE AND ALBUTEROL SULFATE 3 ML: .5; 3 SOLUTION RESPIRATORY (INHALATION) at 01:07

## 2022-07-03 RX ADMIN — ROPINIROLE HYDROCHLORIDE 3 MG: 1 TABLET, FILM COATED ORAL at 04:07

## 2022-07-03 RX ADMIN — HEPARIN SODIUM 5000 UNITS: 5000 INJECTION, SOLUTION INTRAVENOUS; SUBCUTANEOUS at 01:07

## 2022-07-03 RX ADMIN — HEPARIN 280 UNITS: 100 SYRINGE at 04:07

## 2022-07-03 RX ADMIN — LEVOFLOXACIN 500 MG: 500 TABLET, FILM COATED ORAL at 11:07

## 2022-07-03 RX ADMIN — SODIUM BICARBONATE: 84 INJECTION, SOLUTION INTRAVENOUS at 10:07

## 2022-07-03 RX ADMIN — MULTIPLE VITAMINS W/ MINERALS TAB 1 TABLET: TAB at 10:07

## 2022-07-03 RX ADMIN — HEPARIN SODIUM 5000 UNITS: 5000 INJECTION, SOLUTION INTRAVENOUS; SUBCUTANEOUS at 06:07

## 2022-07-03 RX ADMIN — HEPARIN SODIUM 5000 UNITS: 5000 INJECTION, SOLUTION INTRAVENOUS; SUBCUTANEOUS at 09:07

## 2022-07-03 RX ADMIN — DEXAMETHASONE 6 MG: 0.5 TABLET ORAL at 11:07

## 2022-07-03 RX ADMIN — ALBUTEROL SULFATE 2 PUFF: 90 AEROSOL, METERED RESPIRATORY (INHALATION) at 11:07

## 2022-07-03 RX ADMIN — ALBUTEROL SULFATE 2 PUFF: 90 AEROSOL, METERED RESPIRATORY (INHALATION) at 06:07

## 2022-07-03 RX ADMIN — ONDANSETRON 4 MG: 2 INJECTION INTRAMUSCULAR; INTRAVENOUS at 11:07

## 2022-07-03 RX ADMIN — ROPINIROLE HYDROCHLORIDE 3 MG: 1 TABLET, FILM COATED ORAL at 10:07

## 2022-07-03 RX ADMIN — ALBUTEROL SULFATE 2 PUFF: 90 AEROSOL, METERED RESPIRATORY (INHALATION) at 12:07

## 2022-07-03 NOTE — PROGRESS NOTES
Nutrition   Progress Note      Recommendations:  Once medically feasible, goal renal diet.       Reason for Evaluation:  Identified at risk by screening criteria    Diagnosis:    1. Shortness of breath    2. Shortness of breath    3. Acute renal failure    4. Dialysis complication        Relevant Medical History:    Past Medical History:   Diagnosis Date    COPD (chronic obstructive pulmonary disease)     Depression     HLD (hyperlipidemia)     Hypertension     PAD (peripheral artery disease)     PVD (peripheral vascular disease)     RLS (restless legs syndrome)          Nutrition Diet History:    Factors affecting nutritional intake: NPO    Food / Taoism / Culture Preferences:  none      Nutrition Prescription Ordered:    Current Diet Order: NPO    Appetite:  Other:  NPO    PO intake: NPO      Labs / Medications / Procedures:    Nutrition Related Medications: ascorbic acid, dexamethasone, MVI    Nutrition Related Labs:  7/3: Na 135, Bun 92.4, Crea 16.37, GFR 2, Gluc 166      Anthropometrics:  Height:  158cm  Admit Weight:   57.4kg  Latest Weight:   57.4kg    Wt Readings from Last 5 Encounters:   03/03/21 57.4 kg (126 lb 8.7 oz)     IBW: 50kg  %IBW: 114%  UBW: unknown at this time  %Weight Change: unknown at this time  BMI: 23  BMI classification:  Normal (BMI 18.5 - 24.9)      Nutrition Narrative:  7/3: Pt receiving dialysis at bedside during rounds in ED. Hx obtained from pt RN. RN to contact MD to order dialysis diet, currently pt is without a diet order. Pt with oxymask. NSG malnutrition screen not yet complete. Once pt diet advanced, consider adding oral nutrition supplement if PO intakes <75% of meals served.     Monitoring and Evaluation:    Nutrition Monitoring and Evaluation:  food and beverage intake    Nutrition Risk:  Level of Nutrition Risk:  Low  Frequency of Follow up:  Dietitian will f/up within 7 days.

## 2022-07-03 NOTE — NURSING
07/03/22 1300   Post-Hemodialysis Assessment   Rinseback Volume (mL) 250 mL   Blood Volume Processed (Liters) 28.7 L   Dialyzer Clearance Clear   Duration of Treatment 120 minutes   Total UF (mL) 0 mL   Patient Response to Treatment pt tolerated tx well   Post-Hemodialysis Comments pt's catheter alarms everytime she coughs, had to position her on her left side to get it to work. VSS, Net UF 0 mL.

## 2022-07-03 NOTE — H&P
[facility] - Emergency Dept    History & Physical      Patient Name: Natalia Bower  MRN: [id number]  Admission Date: 2022  Attending Physician: Artur Linares  Primary Care Provider: Nohemy Burgess MD         Patient information was obtained from ED records and patient.     Subjective:     Principal Problem: Covid 19, SOB, SYLVESTER, FVO    Chief Complaint: SOB     HPI:   This is a 67 yo female, who presented to the ED with increased sob over past 5 days. PMH COPD, Smoker, PAD, PVD, HTN, Depression, HLD, and RLS. Per family report, patient had angiogram of lower extremity Monday with Dr. Kaplan. Patient was having some SOB when she was at appointment and became worse over past couple of days. Patient was unable to get out of bed as she was so weak. Pt states that she only urinated once in the last 24 hours. Pt attempted to eat this am and had diarrhea. Pt does continue to smoke, however has not smoked in past 3 days. Pt was found to be COVID 19 + and in ARF on admit with a BUN/ CR of 95.6/17.52 respectively and Dr. Cadet has been consulted. Pt also found to be anemic with Hgb of 7.3. She will receive one unit of blood. Patient was vaccinated for COVID.  Pt being admit for SOB s/t COVID19 diagnosis, ARF and FVO.     Past Medical Hx: COPD, HTN, HLD, RLS, Depression, PAD, PVD    PSH: Stents to BLE    Family Hx - Non contributory to this process.     Social Hx; Smoker, denies ETOH and illicits      Review of patient's allergies indicates:   Allergen Reactions    Oxycodone Hives       No current facility-administered medications on file prior to encounter.     Current Outpatient Medications on File Prior to Encounter   Medication Sig    albuterol (PROVENTIL/VENTOLIN HFA) 90 mcg/actuation inhaler SMARTSI Puff(s) By Mouth Every 4 Hours PRN    amLODIPine (NORVASC) 5 MG tablet Take 5 mg by mouth once daily.    aspirin (ECOTRIN) 81 MG EC tablet Take 81 mg by mouth.    buPROPion (WELLBUTRIN  SR) 150 MG TBSR 12 hr tablet Take 150 mg by mouth 2 (two) times daily.    clopidogreL (PLAVIX) 75 mg tablet Take 75 mg by mouth once daily.    DECARA 1,250 mcg (50,000 unit) capsule Take 50,000 Units by mouth every 7 days.    diazePAM (VALIUM) 5 MG tablet SMARTSI-2 Tablet(s) By Mouth PRN    ELIQUIS 5 mg Tab Take 5 mg by mouth 2 (two) times daily.    EUTHYROX 75 mcg tablet Take 75 mcg by mouth once daily.    fluticasone-salmeterol diskus inhaler 250-50 mcg Inhale 1 puff into the lungs.    furosemide (LASIX) 20 MG tablet Take by mouth.    lisinopriL (PRINIVIL,ZESTRIL) 20 MG tablet Take 20 mg by mouth once daily.    prednisoLONE acetate (PRED FORTE) 1 % DrpS Place into both eyes.    rOPINIRole (REQUIP) 3 MG tablet Take 3 mg by mouth once daily.    rosuvastatin (CRESTOR) 40 MG Tab Take 40 mg by mouth nightly.    SPIRIVA WITH HANDIHALER 18 mcg inhalation capsule 1 capsule once daily.    SYMBICORT 160-4.5 mcg/actuation HFAA SMARTSI Puff(s) By Mouth Twice Daily     Family History    None       Tobacco Use    Smoking status: Not on file    Smokeless tobacco: Not on file   Substance and Sexual Activity    Alcohol use: Not on file    Drug use: Not on file    Sexual activity: Not on file     ROS -   Generalized - weakness, poor intake, fatigue,   SOB at rest  Denies abd pain  Urinated x 1 this am    Objective:     Vital Signs (Most Recent):  Temp: 97.9 °F (36.6 °C) (22)  Pulse: 77 (22)  Resp: (!) 26 (22)  BP: 116/82 (22)  SpO2: 96 % (22) Vital Signs (24h Range):  Temp:  [97 °F (36.1 °C)-97.9 °F (36.6 °C)] 97.9 °F (36.6 °C)  Pulse:  [73-81] 77  Resp:  [22-29] 26  SpO2:  [92 %-96 %] 96 %  BP: (111-154)/(64-88) 116/82        There is no height or weight on file to calculate BMI.    Generalized - ill appearing female    HEENT - NC AT PERRLA  CVA - S1 S2, IRR  Resp - bilateral diminished, rhonchi, with mild exp wheeze, use of accessory muscles  Abd-  soft, NT ND BS +  EXT - trace BLE edema  Neuro - II - XII Grossly intact      Significant Labs:    Carbon Dioxide 20 (*)       Glucose Level 147 (*)       Blood Urea Nitrogen 95.6 (*)       Creatinine 17.52 (*)       Calcium Level Total 8.3 (*)       Albumin Level 2.7 (*)       Albumin/Globulin Ratio 0.8 (*)       Alanine Aminotransferase 63 (*)       Aspartate Aminotransferase 65 (*)       All other components within normal limits   TSH - Abnormal; Notable for the following components:     Thyroid Stimulating Hormone 8.7402 (*)       All other components within normal limits   TROPONIN I - Abnormal; Notable for the following components:     Troponin-I 0.088 (*)       All other components within normal limits   T4, FREE - Abnormal; Notable for the following components:     Thyroxine Free 0.68 (*)       All other components within normal limits   B-TYPE NATRIURETIC PEPTIDE - Abnormal; Notable for the following components:     Natriuretic Peptide 226.1 (*)       All other components within normal limits   COVID/FLU A&B PCR - Abnormal; Notable for the following components:     SARS-CoV-2 PCR Detected (*)       All other components within normal limits   CBC WITH DIFFERENTIAL - Abnormal; Notable for the following components:     RBC 2.94 (*)       Hgb 7.9 (*)       Hct 25.5 (*)       MCH 26.9 (*)       MCHC 31.0 (*)       IG# 0.08 (*)       All other components within normal limits   APTT - Abnormal; Notable for the following components:     PTT 34.5 (*)          Significant Imaging:    Carbon Dioxide 20 (*)       Glucose Level 147 (*)       Blood Urea Nitrogen 95.6 (*)       Creatinine 17.52 (*)       Calcium Level Total 8.3 (*)       Albumin Level 2.7 (*)       Albumin/Globulin Ratio 0.8 (*)       Alanine Aminotransferase 63 (*)       Aspartate Aminotransferase 65 (*)       All other components within normal limits   TSH - Abnormal; Notable for the following components:     Thyroid Stimulating Hormone 8.7402 (*)        All other components within normal limits   TROPONIN I - Abnormal; Notable for the following components:     Troponin-I 0.088 (*)       All other components within normal limits   T4, FREE - Abnormal; Notable for the following components:     Thyroxine Free 0.68 (*)       All other components within normal limits   B-TYPE NATRIURETIC PEPTIDE - Abnormal; Notable for the following components:     Natriuretic Peptide 226.1 (*)       All other components within normal limits   COVID/FLU A&B PCR - Abnormal; Notable for the following components:     SARS-CoV-2 PCR Detected (*)       All other components within normal limits   CBC WITH DIFFERENTIAL - Abnormal; Notable for the following components:     RBC 2.94 (*)       Hgb 7.9 (*)       Hct 25.5 (*)       MCH 26.9 (*)       MCHC 31.0 (*)       IG# 0.08 (*)       All other components within normal limits   APTT - Abnormal; Notable for the following components:     PTT 34.5 (*)    EXAMINATION:  XR CHEST 1 VIEW     CLINICAL HISTORY:  Shortness of breath     TECHNIQUE:  Single frontal view of the chest was performed.     COMPARISON:  None     FINDINGS:  Lordotic radiograph obtained.     LINES AND TUBES: None     MEDIASTINUM AND AIMEE: The cardiac silhouette is normal. Aortic atherosclerosis. Mild prominence of the right hilum.     LUNGS: No lobar consolidation.Mild basilar interstitial prominence.        PLEURA:There is blunting of the right costophrenic sulcus.No pneumothorax.     BONES: No acute osseous abnormality.    Assessment/Plan:     There are no hospital problems to display for this patient.    VTE Risk Mitigation (From admission, onward)    None        IMPRESSION/PLAN  1. SOB   Titrate oxygen to maintain sat >92%  2. COVID 19 +   Supportive therapy - unable to receive Remdesivir s/t to renal fx   3. SYLVESTER   Dr. Rausch - renal consulted - fluids per renal  4. Anemia   Transfusing 1 unit blood   Check stool focb   Iron studies  5. COPD - Smoker  6. FVO  7. A fib   Heparin  - 5000 sq q8    Thank you for allowing us to participate in the care of this patient    SAY Sparks as scribe for Dr. Artur Ross  Department of Hospital Medicine   [facility] - Emergency Dept

## 2022-07-03 NOTE — PROGRESS NOTES
Internal Medicine Progress Note    Subjective/Interval History:  This is a 65 yo female, who presented to the ED with increased sob over past 5 days. PMH COPD, Smoker, PAD, PVD, HTN, Depression, HLD, and RLS. Per family report, patient had angiogram of lower extremity Monday with Dr. Kaplan. Patient was having some SOB when she was at appointment and became worse over past couple of days. Patient was unable to get out of bed as she was so weak. Pt states that she only urinated once in the last 24 hours. Pt attempted to eat this am and had diarrhea. Pt does continue to smoke, however has not smoked in past 3 days. Pt was found to be COVID 19 + and in ARF on admit with a BUN/ CR of 95.6/17.52 respectively and Dr. Cadet has been consulted. Pt also found to be anemic with Hgb of 7.3. She will receive one unit of blood. Patient was vaccinated for COVID.  Pt being admit for SOB s/t COVID19 diagnosis, ARF and FVO.     7-3-22  Pt with some increased SOB during HD  Did urinate x 1 today  Labs essentially unchanged  Remains with productive cough  On 5 liters O2 sating 90%  Issues with her RLS today    ROS:  Generalized - weakness, poor intake, fatigue,   SOB at rest  Denies abd pain  Urinated x 1 this am    Vital Signs:  /73   Pulse 92   Temp 98.1 °F (36.7 °C) (Oral)   Resp (!) 36   SpO2 96%   There is no height or weight on file to calculate BMI.    Physical Exam:  Generalized - ill appearing female    HEENT - [place] AT [facility]  CVA - S1 S2, IRR  Resp - bilateral diminished, rhonchi, with mild exp wheeze, use of accessory muscles  Abd- soft, NT ND BS +  EXT - trace BLE edema  Neuro - II - XII Grossly intact  HD Cath to [facility]    Labs:  Recent Results (from the past 48 hour(s))   COVID/FLU A&B PCR    Collection Time: 07/02/22  3:11 PM   Result Value Ref Range    Influenza A PCR Not Detected Not Detected    Influenza B PCR Not Detected Not Detected    SARS-CoV-2 PCR Detected (A) Not Detected   Comprehensive Metabolic Panel     Collection Time: 07/02/22  3:23 PM   Result Value Ref Range    Sodium Level 138 136 - 145 mmol/L    Potassium Level 3.8 3.5 - 5.1 mmol/L    Chloride 98 98 - 107 mmol/L    Carbon Dioxide 20 (L) 23 - 31 mmol/L    Glucose Level 147 (H) 82 - 115 mg/dL    Blood Urea Nitrogen 95.6 (H) 9.8 - 20.1 mg/dL    Creatinine 17.52 (H) 0.55 - 1.02 mg/dL    Calcium Level Total 8.3 (L) 8.4 - 10.2 mg/dL    Protein Total 6.1 5.8 - 7.6 gm/dL    Albumin Level 2.7 (L) 3.4 - 4.8 gm/dL    Globulin 3.4 2.4 - 3.5 gm/dL    Albumin/Globulin Ratio 0.8 (L) 1.1 - 2.0 ratio    Bilirubin Total 0.6 <=1.5 mg/dL    Alkaline Phosphatase 72 40 - 150 unit/L    Alanine Aminotransferase 63 (H) 0 - 55 unit/L    Aspartate Aminotransferase 65 (H) 5 - 34 unit/L    Estimated GFR-Non  2 mls/min/1.73/m2   TSH    Collection Time: 07/02/22  3:23 PM   Result Value Ref Range    Thyroid Stimulating Hormone 8.7402 (H) 0.3500 - 4.9400 uIU/mL   Troponin I    Collection Time: 07/02/22  3:23 PM   Result Value Ref Range    Troponin-I 0.088 (H) 0.000 - 0.045 ng/mL   T4, Free    Collection Time: 07/02/22  3:23 PM   Result Value Ref Range    Thyroxine Free 0.68 (L) 0.70 - 1.48 ng/dL   BNP    Collection Time: 07/02/22  3:23 PM   Result Value Ref Range    Natriuretic Peptide 226.1 (H) <=100.0 pg/mL   Magnesium    Collection Time: 07/02/22  3:23 PM   Result Value Ref Range    Magnesium Level 2.30 1.60 - 2.60 mg/dL   CBC with Differential    Collection Time: 07/02/22  3:23 PM   Result Value Ref Range    WBC 8.2 4.5 - 11.5 x10(3)/mcL    RBC 2.94 (L) 4.20 - 5.40 x10(6)/mcL    Hgb 7.9 (L) 12.0 - 16.0 gm/dL    Hct 25.5 (L) 37.0 - 47.0 %    MCV 86.7 80.0 - 94.0 fL    MCH 26.9 (L) 27.0 - 31.0 pg    MCHC 31.0 (L) 33.0 - 36.0 mg/dL    RDW 16.1 11.5 - 17.0 %    Platelet 311 130 - 400 x10(3)/mcL    MPV 10.1 7.4 - 10.4 fL    Neut % 85.7 %    Lymph % 7.7 %    Mono % 5.4 %    Eos % 0.1 %    Basophil % 0.1 %    Lymph # 0.63 0.6 - 4.6 x10(3)/mcL    Neut # 7.0 2.1 - 9.2  x10(3)/mcL    Mono # 0.44 0.1 - 1.3 x10(3)/mcL    Eos # 0.01 0 - 0.9 x10(3)/mcL    Baso # 0.01 0 - 0.2 x10(3)/mcL    IG# 0.08 (H) 0 - 0.04 x10(3)/mcL    IG% 1.0 %    NRBC% 0.0 %   Protime-INR    Collection Time: 07/02/22  3:23 PM   Result Value Ref Range    PT 12.7 12.5 - 14.5 seconds    INR 0.96 0.00 - 1.30   APTT    Collection Time: 07/02/22  3:23 PM   Result Value Ref Range    PTT 34.5 (H) 23.2 - 33.7 seconds   Hepatitis B Surface Antigen    Collection Time: 07/02/22  3:23 PM   Result Value Ref Range    Hepatitis B Surface Antigen Nonreactive Nonreactive   Prepare RBC 1 Unit    Collection Time: 07/02/22  5:39 PM   Result Value Ref Range    UNIT NUMBER [id number]     UNIT ABO/RH B POS     DISPENSE STATUS Transfused     Unit Expiration [id number]     Product Code R7084D34     Unit Blood Type Code 7300     CROSSMATCH INTERPRETATION Compatible    Type & Screen    Collection Time: 07/02/22  5:39 PM   Result Value Ref Range    Group & Rh B POS     Indirect Chip GEL NEG    POCT ARTERIAL BLOOD GAS    Collection Time: 07/02/22  6:13 PM   Result Value Ref Range    POC PH 7.29 (A) 7.35 - 7.45    POC PCO2 41 35 - 45 mmHg    POC PO2 52 (AA) 55 - mmHg    POC HEMOGLOBIN 8.3 (A) 12 - 16 g/dL    POC SATURATED O2 81.8 %    POC O2Hb 80.8 (A) 94.0 - 97.0 %    POC COHb 1.6 %    POC MetHb 0.7 0.4 - 2 %    POC Potassium 2.9 (A) 3.5 - 5.0 mmol/l    POC Sodium 133 (A) 137 - 145 mmol/l    POC Ionized Calcium 1.06 (A) 1.12 - 1.23 mmol/l    Correct Temperature (PH) 7.29 (A) 7.35 - 7.45    Corrected Temperature (pCO2) 41 35 - 45 mmHg    Corrected Temperature (pO2) 52 (AA) 55 - mmHg    POC HCO3 19.7 mmol/l    Base Deficit -6.4 (A) -2.0 - 2.0 mmol/l    POC Temp 37.0 °C    Specimen source Arterial sample    ABORH RETYPE    Collection Time: 07/02/22  6:42 PM   Result Value Ref Range    ABORH Retype B POS    Iron and TIBC    Collection Time: 07/02/22 10:30 PM   Result Value Ref Range    Iron Binding Capacity Unsaturated 230 70 - 310 ug/dL     Iron Level 28 (L) 50 - 170 ug/dL    Transferrin 240 173 - 360 mg/dL    Iron Binding Capacity Total 258 250 - 450 ug/dL    Iron Saturation 11 (L) 20 - 50 %   Ferritin    Collection Time: 07/02/22 10:30 PM   Result Value Ref Range    Ferritin Level 115.13 4.63 - 204.00 ng/mL   Reticulocytes    Collection Time: 07/02/22 10:31 PM   Result Value Ref Range    Retic Cnt Auto 0.91 (L) 1.1 - 2.1 %    RET# 0.0299 0.016 - 0.078   Urinalysis, Reflex to Urine Culture Urine, Clean Catch    Collection Time: 07/03/22 12:42 AM    Specimen: Urine   Result Value Ref Range    Color, UA Yellow Yellow, Colorless, Other, Clear    Appearance, UA Clear Clear    Specific Hooper, UA 1.011 1.001 - 1.030    pH, UA 5.5 5.0, 5.5, 6.0, 6.5, 7.0, 7.5, 8.0, 8.5    Protein, UA 2+ (A) Negative, 300  mg/dL    Glucose, UA Negative Negative, Normal mg/dL    Ketones, UA Negative Negative, +1, +2, +3, +4, +5, >=160, >=80 mg/dL    Blood, UA 2+ (A) Negative unit/L    Bilirubin, UA Negative Negative mg/dL    Urobilinogen, UA 0.2 0.2, 1.0, Normal mg/dL    Nitrites, UA Negative Negative    Leukocyte Esterase, UA Trace (A) Negative, 75  unit/L   Urinalysis, Microscopic    Collection Time: 07/03/22 12:42 AM   Result Value Ref Range    RBC, UA <5 <=5 /HPF    WBC, UA <5 <=5 /HPF    Squamous Epithelial Cells, UA <5 <=5 /HPF    Bacteria, UA None Seen None Seen, Rare, Occasional /HPF   Comprehensive metabolic panel    Collection Time: 07/03/22  5:49 AM   Result Value Ref Range    Sodium Level 135 (L) 136 - 145 mmol/L    Potassium Level 3.5 3.5 - 5.1 mmol/L    Chloride 98 98 - 107 mmol/L    Carbon Dioxide 20 (L) 23 - 31 mmol/L    Glucose Level 166 (H) 82 - 115 mg/dL    Blood Urea Nitrogen 92.4 (H) 9.8 - 20.1 mg/dL    Creatinine 16.37 (H) 0.55 - 1.02 mg/dL    Calcium Level Total 8.3 (L) 8.4 - 10.2 mg/dL    Protein Total 6.3 5.8 - 7.6 gm/dL    Albumin Level 2.4 (L) 3.4 - 4.8 gm/dL    Globulin 3.9 (H) 2.4 - 3.5 gm/dL    Albumin/Globulin Ratio 0.6 (L) 1.1 - 2.0 ratio     Bilirubin Total 0.7 <=1.5 mg/dL    Alkaline Phosphatase 66 40 - 150 unit/L    Alanine Aminotransferase 56 (H) 0 - 55 unit/L    Aspartate Aminotransferase 51 (H) 5 - 34 unit/L    Estimated GFR-Non  2 mls/min/1.73/m2   Sedimentation rate    Collection Time: 07/03/22  9:42 AM   Result Value Ref Range    Sed Rate 94 (H) 0 - 20 mm/hr   CK    Collection Time: 07/03/22  9:42 AM   Result Value Ref Range    Creatine Kinase 607 (H) 29 - 168 U/L   Lactate Dehydrogenase    Collection Time: 07/03/22  9:42 AM   Result Value Ref Range    Lactate Dehydrogenase 449 (H) 125 - 220 U/L   Lactic Acid, Plasma    Collection Time: 07/03/22  9:42 AM   Result Value Ref Range    Lactic Acid Level 0.6 0.5 - 2.2 mmol/L         Assessment/Plan:  1. SOB              Titrate oxygen to maintain sat >90%   Duo Nebs   Brio  2. COVID 19 +              Supportive therapy - unable to receive Remdesivir s/t to renal fx    On decadron  3. SYLVESTER              Dr. Rausch - renal consulted -HD today  4. Anemia              Transfusing 1 unit blood              Check stool focb              Iron studies  5. COPD - Smoker  6. FVO  7. A fib              Heparin - 5000 sq q8    Continue aggressive supportive care  Appreciate Pulmonary assistance with this patient  Appreciate JUNE Rausch assistance.       Rhoda Alejandre, ANP as scribe for Dr. Artur Ross

## 2022-07-03 NOTE — CONSULTS
Consult note  Nephrology    Admit Date: 7/2/2022   LOS: 1 day     SUBJECTIVE:     CC:SYLVESTER / CKD      HPI:this is 67 Y/O female with past history of COPD , PAD , HTN , Depresion , Hyperlipidemia , RLS , admited to ER with complint of SOB which is getting worse for past few days    Pt reports that she had peripheral angio 5 days ago   Pt was feeling very weak and tired and not being able to come of of bed  apetite very poor  Also nauseated    PMH:  1  COPD  2   HTN  3   PAD,  S/p stent placement  4    depresion  5   RLS    Allergy:  Oxy codon    Social history:  Positive for smoking   Alcohol neg    Family History:       Non contributary    Review of Systems:  Constitutional: No fever or chills  Respiratory: cough pos   SOB pos  Cardiovascular: No chest pain or palpitations  Gastrointestinal: No nausea or vomiting  Neurological    weaknessis positive, no confusion or disorientation    OBJECTIVE:     Vital Signs Range (Last 24H):  Vitals:    07/03/22 1325   BP:    Pulse: 88   Resp: 18   Temp:        Temp:  [97.9 °F (36.6 °C)-98.2 °F (36.8 °C)]   Pulse:  [59-93]   Resp:  [16-36]   BP: (104-138)/(54-93)   SpO2:  [93 %-97 %]     I & O (Last 24H):    Intake/Output Summary (Last 24 hours) at 7/3/2022 1526  Last data filed at 7/3/2022 1300  Gross per 24 hour   Intake 326.25 ml   Output 0 ml   Net 326.25 ml       Physical Exam:  Alert  Oriented  Chest   Symmetric  Lungs   Reported  Clear  Heart  RRR on monitior  abd  Reported as soft non tender  Ext   Reported  Trace edema      Laboratory Data:    Recent Labs   Lab 07/03/22  0549   *   K 3.5   CO2 20*   BUN 92.4*   CREATININE 16.37*   GLUCOSE 166*   CALCIUM 8.3*     Lab Results   Component Value Date    CALCIUM 8.3 (L) 07/03/2022     Lab Results   Component Value Date    IRON 28 (L) 07/02/2022    TIBC 258 07/02/2022    FERRITIN 115.13 07/02/2022       Medications:  Medication list was reviewed and changes noted under Assessment/Plan.    Diagnostic Results:    Reviewed  most recent CT/US/Echo/MRI    ASSESSMENT/PLAN:   1   SYLVESTER  /  ckd   Bilateral echogenic kidneys  2   covid  +  3   Copd  4   PAD / H/o  Stents  5   RLS  6   HTN      Plan  Due to very high BUN and CR pt had hemodialysis today for 2 hrs  Will dialyze again for 3 hrs  tomorrow

## 2022-07-03 NOTE — PROCEDURES
Natalia Bower is a 66 y.o. female patient.    Temp: 98.1 °F (36.7 °C) (07/02/22 2203)  Pulse: 73 (07/02/22 2230)  Resp: (!) 24 (07/02/22 2230)  BP: 119/70 (07/02/22 2230)  SpO2: 95 % (07/02/22 2230)       Central Line    Date/Time: 7/2/2022 11:57 PM  Performed by: Samuel Miller MD  Authorized by: Samuel Miller MD     Location procedure was performed:  [facility] GENERAL ACCESS  Pre-operative diagnosis:  Kidney failure  Consent Done ?:  Yes  Time out complete?: Verified correct patient, procedure, equipment, staff, and site/side    Indications:  Hemodialysis  Anesthesia:  Local infiltration  Local anesthetic:  Lidocaine 2% without epinephrine  Preparation:  Skin prepped with ChloraPrep  Skin prep agent dried: Skin prep agent completely dried prior to procedure    Sterile barriers: All five maximal sterile barriers used - gloves, gown, cap, mask and large sterile sheet    Hand hygiene: Hand hygiene performed immediately prior to central venous catheter insertion    Location:  Right internal jugular  Catheter type:  Trialysis  Catheter size:  12 Fr  Inserted Catheter Length (cm):  15  Ultrasound guidance: Yes    Vessel Caliber:  Small  Needle advanced into vessel with real time ultrasound guidance.    Guidewire confirmed in vessel.    Steril sheath on probe.    Sterile gel used.  Manometry: No    Number of attempts:  2  Securement:  Line sutured, blood return through all ports and sterile dressing applied  Technical Procedures Used:  Seldinger  Complications: No    XRay:  Placement verified by x-ray  Other Complications:  Central line placed using seldinger technique.  All 3 ports flushed and aspirated with ease.   Central line placed using seldinger technique.  All 3 ports flushed and aspirated with ease.        7/2/2022

## 2022-07-03 NOTE — PROGRESS NOTES
[facility] - Emergency Dept  Pulmonary Critical Care Note    Patient Name: Natalia Bower  MRN: [id number]  Admission Date: 7/2/2022  Hospital Length of Stay: 1 days  Code Status: Full Code  Attending Provider: No att. providers found  Primary Care Provider: Nohemy Burgess MD     Subjective:     HPI:   This is a 67 yo female, who presented to the ED at [facility] with increased sob over past 5 days. She is a smoker with PMH of COPD, PAD, PVD, HTN, Depression, HLD, and RLS. Per family report, patient had angiogram of lower extremity Monday with Dr. Kaplan. Patient was having some SOB when she was at appointment which gradually worsened over the last several days. The patient also had complaints of significant weakness and decreased urination with diarrhea. Workup revealed Acute renal failure with a BUN/ CR of 95.6/17.52 and  was found to be positive for covid 19. Dr. Cadet has been consulted. Pt also found to be anemic with Hgb of 7.3 and was transfused one unit of blood. Patient was vaccinated for COVID.     Chest x-ray showed trace right-sided pleural effusion with bibasilar interstitial prominence.  Upon review of the patient's vital signs she has been afebrile O2 saturations ranged anywhere from lowest documented sat of 92% as high as 97% on 5 L via oxymask.     Hospital Course/Significant events:  Trialysis cath placement - Dr. Samuel Miller 7/2/22    24 Hour Interval History:  Under HD cath placement.     Past Medical History:   Diagnosis Date    COPD (chronic obstructive pulmonary disease)     Depression     HLD (hyperlipidemia)     Hypertension     PAD (peripheral artery disease)     PVD (peripheral vascular disease)     RLS (restless legs syndrome)        No past surgical history on file.    Social History     Socioeconomic History    Marital status: Single   Tobacco Use    Smoking status: Current Every Day Smoker     Types: Cigarettes     2ppd smoker since teenager.        Objective:     Current Outpatient Medications   Medication Instructions    albuterol (PROVENTIL/VENTOLIN HFA) 90 mcg/actuation inhaler SMARTSI Puff(s) By Mouth Every 4 Hours PRN    amLODIPine (NORVASC) 5 mg, Oral, Daily    aspirin (ECOTRIN) 81 mg, Oral    buPROPion (WELLBUTRIN SR) 150 mg, Oral, 2 times daily    clopidogreL (PLAVIX) 75 mg, Oral, Daily    DECARA 50,000 Units, Oral, Every 7 days    diazePAM (VALIUM) 5 MG tablet SMARTSI-2 Tablet(s) By Mouth PRN    ELIQUIS 5 mg, Oral, 2 times daily    EUTHYROX 75 mcg, Oral, Daily    fluticasone-salmeterol diskus inhaler 250-50 mcg 1 puff, Inhalation    furosemide (LASIX) 20 MG tablet Oral    lisinopriL (PRINIVIL,ZESTRIL) 20 mg, Oral, Daily    prednisoLONE acetate (PRED FORTE) 1 % DrpS Both Eyes    rOPINIRole (REQUIP) 3 mg, Oral, Daily    rosuvastatin (CRESTOR) 40 mg, Oral, Nightly    SPIRIVA WITH HANDIHALER 18 mcg inhalation capsule 1 capsule, Daily    SYMBICORT 160-4.5 mcg/actuation HFAA SMARTSI Puff(s) By Mouth Twice Daily       Current Inpatient Medications   fluticasone furoate-vilanteroL  1 puff Inhalation Daily    heparin (porcine)  5,000 Units Subcutaneous Q8H    LIDOcaine (PF) 20 mg/mL (2%)  5 mL Infiltration ED 1 Time    LIDOcaine HCL 20 mg/ml (2%)        rOPINIRole  1 mg Oral TID         Intake/Output Summary (Last 24 hours) at 7/3/2022 0912  Last data filed at 2022 2203  Gross per 24 hour   Intake 326.25 ml   Output --   Net 326.25 ml       Review of Systems   Constitutional: Positive for malaise/fatigue.   HENT: Negative.    Eyes: Negative.    Respiratory: Positive for sputum production, shortness of breath and wheezing.    Cardiovascular: Negative.    Gastrointestinal: Positive for diarrhea.   Genitourinary: Negative.    Musculoskeletal: Negative.    Skin: Negative.    Neurological: Positive for weakness.   Psychiatric/Behavioral: Negative.         Vital Signs (Most Recent):  Temp: 98.1 °F (36.7 °C) (22  2203)  Pulse: (!) 59 (07/03/22 0600)  Resp: (!) 23 (07/03/22 0600)  BP: 104/75 (07/03/22 0600)  SpO2: 97 % (07/03/22 0600)  There is no height or weight on file to calculate BMI.    Vital Signs (24h Range):  Temp:  [97 °F (36.1 °C)-98.2 °F (36.8 °C)] 98.1 °F (36.7 °C)  Pulse:  [59-82] 59  Resp:  [16-32] 23  SpO2:  [92 %-97 %] 97 %  BP: (104-154)/(54-93) 104/75     Physical Exam  HENT:      Head: Normocephalic and atraumatic.      Mouth/Throat:      Mouth: Mucous membranes are dry.   Cardiovascular:      Rate and Rhythm: Regular rhythm. Bradycardia present.      Heart sounds: Normal heart sounds.   Pulmonary:      Breath sounds: Wheezing present.   Abdominal:      General: Bowel sounds are normal.      Palpations: Abdomen is soft.   Neurological:      General: No focal deficit present.      Mental Status: She is alert.   Psychiatric:         Mood and Affect: Mood normal.         Behavior: Behavior normal.         Lines/Drains/Airways     Central Venous Catheter Line  Duration                Hemodialysis Catheter 07/02/22 2330 right internal jugular <1 day          Peripheral Intravenous Line  Duration                Peripheral IV - Single Lumen 07/02/22 1834 20 G Anterior;Right Antecubital <1 day         Peripheral IV - Single Lumen 07/02/22 2033 20 G Anterior;Left Forearm <1 day              Significant Labs:    Lab Results   Component Value Date    WBC 8.2 07/02/2022    HGB 7.9 (L) 07/02/2022    HCT 25.5 (L) 07/02/2022    MCV 86.7 07/02/2022     07/02/2022       BMP  Lab Results   Component Value Date     (L) 07/03/2022    K 3.5 07/03/2022    CO2 20 (L) 07/03/2022    BUN 92.4 (H) 07/03/2022    CREATININE 16.37 (H) 07/03/2022    CALCIUM 8.3 (L) 07/03/2022    EGFRNONAA 2 07/03/2022       ABG  Recent Labs   Lab 07/02/22  1813   PH 7.29*   PO2 52*   PCO2 41   HCO3 19.7         Significant Imaging:  US Retroperitoneal Complete  Narrative: EXAMINATION:  US RETROPERITONEAL COMPLETE    CLINICAL HISTORY:  ;  acute renal failure;    TECHNIQUE:  Multiplanar grayscale sonographic images were obtained of the kidneys and urinary bladder.    COMPARISON:  11 April 2014    FINDINGS:  Exam quality: adequate for evaluation    Right Kidney: The kidney measures 13 cm x 5.5 cm.  There is diffusely heterogeneous and increased echogenicity of the renal parenchyma, suggestive of chronic medical renal disease.  No masses or complex cysts are appreciated.  No collecting system dilatation.  An echogenic focus measuring approximately 0.7 cm in maximal diameter is noted at the interpolar renal cortex, may represent calcification.  No shadowing stone is identified.  No perinephric collection or free abdominal fluid.    Left Kidney: The kidney measures 12 cm x 5.5 cm.  Diffusely heterogeneous and hyperechoic appearance of the renal tissue.  No masses or complex cysts are appreciated.  No collecting system dilatation. No shadowing calcification is identified.  No perinephric collection or free abdominal fluid.    Bladder: No convincing intraluminal abnormality.  No wall thickening or focal mural lesion.  Impression: 1. Findings suggestive of chronic bilateral medical renal disease.  2. Possible nonobstructing small right renal calcification.  3. Unremarkable sonographic appearance of the urinary bladder.    Electronically signed by: Yan Henderson  Date:    07/02/2022  Time:    19:31  X-Ray Chest 1 View  Narrative: EXAMINATION:  XR CHEST 1 VIEW    CLINICAL HISTORY:  Shortness of breath    TECHNIQUE:  Single frontal view of the chest was performed.    COMPARISON:  None    FINDINGS:  Lordotic radiograph obtained.    LINES AND TUBES: None    MEDIASTINUM AND AIMEE: The cardiac silhouette is normal. Aortic atherosclerosis. Mild prominence of the right hilum.    LUNGS: No lobar consolidation.Mild basilar interstitial prominence.    PLEURA:There is blunting of the right costophrenic sulcus.No pneumothorax.    BONES: No acute osseous  abnormality.  Impression: Possible trace right pleural effusion    Basilar interstitial prominence may be related to senescent change or volume status.    Electronically signed by: Jacqueline Ojeda  Date:    07/02/2022  Time:    15:23        Assessment/Plan:     Assessment  1. Report unquantiated COPD - followed By Dr. Anderson   2. COVID-19  3. Acute Renal Failure s/p HD cath insertion   4. Acute hypoxemic Respiratory Failure likely multi Factorial  5. Anemia s/p transfusion with one unit PRBC  6. A. Fib     Plan  1. Continue supportive care   2. With acute renal failure she will not qualify for remdesivir; if symptoms worsen and inflammatory makers are elevated can consider actemra   3. Encouraged on pronation when and if possible. Mobilization   4. DVT prophylaxis with Heparin already ordered.   5. Renal has been consulted. HD cath was inserted. Defer to HD to them   6. Start decadron s/t wheezing; change nebs to scheduled.     DVT Prophylaxis: heparin   GI Prophylaxis: none       Maryanne Pandya, ANP  Pulmonary Critical Care Medicine  [facility] - Emergency Dept

## 2022-07-04 LAB
ALBUMIN SERPL-MCNC: 2.5 GM/DL (ref 3.4–4.8)
BUN SERPL-MCNC: 64.6 MG/DL (ref 9.8–20.1)
CALCIUM SERPL-MCNC: 8.3 MG/DL (ref 8.4–10.2)
CHLORIDE SERPL-SCNC: 99 MMOL/L (ref 98–107)
CO2 SERPL-SCNC: 24 MMOL/L (ref 23–31)
CREAT SERPL-MCNC: 11.01 MG/DL (ref 0.55–1.02)
GLUCOSE SERPL-MCNC: 94 MG/DL (ref 82–115)
MAGNESIUM SERPL-MCNC: 1.9 MG/DL (ref 1.6–2.6)
PHOSPHATE SERPL-MCNC: 7.4 MG/DL (ref 2.3–4.7)
POTASSIUM SERPL-SCNC: 3.8 MMOL/L (ref 3.5–5.1)
PTH-INTACT SERPL-MCNC: 212.4 PG/ML (ref 8.7–77)
SODIUM SERPL-SCNC: 136 MMOL/L (ref 136–145)

## 2022-07-04 PROCEDURE — 25000242 PHARM REV CODE 250 ALT 637 W/ HCPCS: Performed by: INTERNAL MEDICINE

## 2022-07-04 PROCEDURE — 63600175 PHARM REV CODE 636 W HCPCS: Performed by: NURSE PRACTITIONER

## 2022-07-04 PROCEDURE — 11000001 HC ACUTE MED/SURG PRIVATE ROOM

## 2022-07-04 PROCEDURE — 25000003 PHARM REV CODE 250: Performed by: NURSE PRACTITIONER

## 2022-07-04 PROCEDURE — 25000242 PHARM REV CODE 250 ALT 637 W/ HCPCS: Performed by: NURSE PRACTITIONER

## 2022-07-04 PROCEDURE — 27000207 HC ISOLATION

## 2022-07-04 PROCEDURE — 63600175 PHARM REV CODE 636 W HCPCS

## 2022-07-04 PROCEDURE — 80100016 HC MAINTENANCE HEMODIALYSIS

## 2022-07-04 PROCEDURE — 25000003 PHARM REV CODE 250: Performed by: INTERNAL MEDICINE

## 2022-07-04 PROCEDURE — 83735 ASSAY OF MAGNESIUM: CPT | Performed by: INTERNAL MEDICINE

## 2022-07-04 PROCEDURE — 83970 ASSAY OF PARATHORMONE: CPT | Performed by: INTERNAL MEDICINE

## 2022-07-04 PROCEDURE — 80069 RENAL FUNCTION PANEL: CPT | Performed by: INTERNAL MEDICINE

## 2022-07-04 PROCEDURE — 36415 COLL VENOUS BLD VENIPUNCTURE: CPT | Performed by: INTERNAL MEDICINE

## 2022-07-04 PROCEDURE — 27000221 HC OXYGEN, UP TO 24 HOURS

## 2022-07-04 PROCEDURE — 94761 N-INVAS EAR/PLS OXIMETRY MLT: CPT

## 2022-07-04 RX ADMIN — ACETAMINOPHEN 650 MG: 325 TABLET, FILM COATED ORAL at 02:07

## 2022-07-04 RX ADMIN — ALBUTEROL SULFATE 2 PUFF: 90 AEROSOL, METERED RESPIRATORY (INHALATION) at 05:07

## 2022-07-04 RX ADMIN — OXYCODONE HYDROCHLORIDE AND ACETAMINOPHEN 500 MG: 500 TABLET ORAL at 09:07

## 2022-07-04 RX ADMIN — ALBUTEROL SULFATE 2 PUFF: 90 AEROSOL, METERED RESPIRATORY (INHALATION) at 12:07

## 2022-07-04 RX ADMIN — ROPINIROLE HYDROCHLORIDE 1 MG: 1 TABLET, FILM COATED ORAL at 11:07

## 2022-07-04 RX ADMIN — MULTIPLE VITAMINS W/ MINERALS TAB 1 TABLET: TAB at 11:07

## 2022-07-04 RX ADMIN — MUPIROCIN: 20 OINTMENT TOPICAL at 09:07

## 2022-07-04 RX ADMIN — HEPARIN SODIUM 5000 UNITS: 5000 INJECTION, SOLUTION INTRAVENOUS; SUBCUTANEOUS at 01:07

## 2022-07-04 RX ADMIN — ROPINIROLE HYDROCHLORIDE 3 MG: 1 TABLET, FILM COATED ORAL at 09:07

## 2022-07-04 RX ADMIN — HEPARIN SODIUM 5000 UNITS: 5000 INJECTION, SOLUTION INTRAVENOUS; SUBCUTANEOUS at 05:07

## 2022-07-04 RX ADMIN — OXYCODONE HYDROCHLORIDE AND ACETAMINOPHEN 500 MG: 500 TABLET ORAL at 11:07

## 2022-07-04 RX ADMIN — DEXAMETHASONE 6 MG: 0.5 TABLET ORAL at 02:07

## 2022-07-04 RX ADMIN — FLUTICASONE FUROATE AND VILANTEROL TRIFENATATE 1 PUFF: 100; 25 POWDER RESPIRATORY (INHALATION) at 02:07

## 2022-07-04 RX ADMIN — HEPARIN SODIUM 5000 UNITS: 5000 INJECTION, SOLUTION INTRAVENOUS; SUBCUTANEOUS at 11:07

## 2022-07-04 RX ADMIN — ACETAMINOPHEN 650 MG: 325 TABLET, FILM COATED ORAL at 05:07

## 2022-07-04 RX ADMIN — MUPIROCIN: 20 OINTMENT TOPICAL at 11:07

## 2022-07-04 RX ADMIN — LEVOFLOXACIN 500 MG: 500 TABLET, FILM COATED ORAL at 11:07

## 2022-07-04 NOTE — PLAN OF CARE
Problem: Adult Inpatient Plan of Care  Goal: Plan of Care Review  Outcome: Ongoing, Progressing  Goal: Patient-Specific Goal (Individualized)  Outcome: Ongoing, Progressing  Goal: Absence of Hospital-Acquired Illness or Injury  Outcome: Ongoing, Progressing  Goal: Optimal Comfort and Wellbeing  Outcome: Ongoing, Progressing  Goal: Readiness for Transition of Care  Outcome: Ongoing, Progressing     Problem: Device-Related Complication Risk (Hemodialysis)  Goal: Safe, Effective Therapy Delivery  Outcome: Ongoing, Progressing     Problem: Hemodynamic Instability (Hemodialysis)  Goal: Effective Tissue Perfusion  Outcome: Ongoing, Progressing     Problem: Infection (Hemodialysis)  Goal: Absence of Infection Signs and Symptoms  Outcome: Ongoing, Progressing     Problem: Fluid and Electrolyte Imbalance (Acute Kidney Injury/Impairment)  Goal: Fluid and Electrolyte Balance  Outcome: Ongoing, Progressing     Problem: Oral Intake Inadequate (Acute Kidney Injury/Impairment)  Goal: Optimal Nutrition Intake  Outcome: Ongoing, Progressing     Problem: Renal Function Impairment (Acute Kidney Injury/Impairment)  Goal: Effective Renal Function  Outcome: Ongoing, Progressing     Problem: Infection  Goal: Absence of Infection Signs and Symptoms  Outcome: Ongoing, Progressing     Problem: Fall Injury Risk  Goal: Absence of Fall and Fall-Related Injury  Outcome: Ongoing, Progressing

## 2022-07-04 NOTE — PLAN OF CARE
"   07/04/22 1258   Discharge Assessment   Assessment Type Discharge Planning Assessment   Confirmed/corrected address, phone number and insurance Yes   Confirmed Demographics Correct on Facesheet   Source of Information patient;family   Communicated LYUBOV with patient/caregiver Date not available/Unable to determine   Reason For Admission COVID   Lives With other (see comments)  (has own home but has lived with sister and bnl for over a year)   Do you expect to return to your current living situation? Yes   Do you have help at home or someone to help you manage your care at home? Yes   Who are your caregiver(s) and their phone number(s)? Rj Ferrari [phone]   Prior to hospitilization cognitive status: Alert/Oriented   Current cognitive status: Alert/Oriented   Walking or Climbing Stairs Difficulty ambulation difficulty, requires equipment;transferring difficulty, assistance 1 person   Mobility Management rolling walker   Dressing/Bathing Difficulty bathing difficulty, assistance 1 person;dressing difficulty, assistance 1 person   Dressing/Bathing Management sister assist with ADLS   Do you have any problems with:   (sister performs)   Home Accessibility wheelchair accessible   Home Layout Able to live on 1st floor   Equipment Currently Used at Home walker, rolling;oxygen  ([facility] provides O2 portable and concentrator)   Readmission within 30 days? No   Patient currently being followed by outpatient case management? Yes   If yes, name of outpatient case management following: other (comments)  (through Dr Kaplan's office)   Do you currently have service(s) that help you manage your care at home? No   Do you take prescription medications? Yes   Do you have prescription coverage? Yes   Coverage Medicare   Do you have any problems affording any of your prescribed medications? No  (assistance through "help program")   Is the patient taking medications as prescribed? yes   Who is going to help you get home at " discharge? Sister   How do you get to doctors appointments? family or friend will provide   Are you on dialysis? Yes  (not scheduled yet started in hospital)   Do you take coumadin? No   Discharge Plan A Home with family   Discharge Plan B Home Health   DME Needed Upon Discharge  none   Discharge Plan discussed with: Sibling;Patient   Name(s) and Number(s) Yeimy Ferrari [phone]   Discharge Barriers Identified None

## 2022-07-04 NOTE — PROGRESS NOTES
07/04/22 1056   Post-Hemodialysis Assessment   Blood Volume Processed (Liters) 34.5 L   Dialyzer Clearance Lightly streaked   Duration of Treatment 180 minutes   Additional Fluid Intake (mL) 500 mL   Total UF (mL) 438 mL   Patient Response to Treatment tolerated treatment well, lines flushed and packed with saline to filling volume with new end caps, Dr Rogers called and informed of poor functioning access, asked for surgery to come and replace access due to not functioning, nurse aware   Post-Hemodialysis Comments no distress noted

## 2022-07-04 NOTE — PROGRESS NOTES
[facility] - Emergency Dept  Pulmonary Critical Care Note    Patient Name: Natalia Bower  MRN: [id number]  Admission Date: 7/2/2022  Hospital Length of Stay: 2 days  Code Status: Full Code  Attending Provider: Artur Ross MD  Primary Care Provider: Nohemy Burgess MD     Subjective:     HPI:   This is a 67 yo female, who presented to the ED at [facility] with increased sob over past 5 days. She is a smoker with PMH of COPD, PAD, PVD, HTN, Depression, HLD, and RLS. Per family report, patient had angiogram of lower extremity Monday with Dr. Kaplan. Patient was having some SOB when she was at appointment which gradually worsened over the last several days. The patient also had complaints of significant weakness and decreased urination with diarrhea. Workup revealed Acute renal failure with a BUN/ CR of 95.6/17.52 and  was found to be positive for covid 19. Dr. Cadet has been consulted. Pt also found to be anemic with Hgb of 7.3 and was transfused one unit of blood. Patient was vaccinated for COVID.     Chest x-ray showed trace right-sided pleural effusion with bibasilar interstitial prominence.  Upon review of the patient's vital signs she has been afebrile O2 saturations ranged anywhere from lowest documented sat of 92% as high as 97% on 5 L via oxymask.     Hospital Course/Significant events:  Trialysis cath placement - Dr. Samuel Miller 7/2/22- underwent first HD treatment on 7/3/2022    24 Hour Interval History:   Underwent first HD treatment yesterday; Currently undergoing HD now. Nursing reports her sats are stable on 4 L oxymask. Denies any complications over night.    Past Medical History:   Diagnosis Date    COPD (chronic obstructive pulmonary disease)     Depression     HLD (hyperlipidemia)     Hypertension     PAD (peripheral artery disease)     PVD (peripheral vascular disease)     RLS (restless legs syndrome)        No past surgical history on file.    Social History      Socioeconomic History    Marital status: Single   Tobacco Use    Smoking status: Current Every Day Smoker     Types: Cigarettes     2ppd smoker since teenager.       Objective:     Current Outpatient Medications   Medication Instructions    albuterol (PROVENTIL/VENTOLIN HFA) 90 mcg/actuation inhaler SMARTSI Puff(s) By Mouth Every 4 Hours PRN    amLODIPine (NORVASC) 5 mg, Oral, Daily    aspirin (ECOTRIN) 81 mg, Oral    buPROPion (WELLBUTRIN SR) 150 mg, Oral, 2 times daily    clopidogreL (PLAVIX) 75 mg, Oral, Daily    co-enzyme Q-10 100 mg, Oral, Daily    DECARA 50,000 Units, Oral, Every 7 days    diazePAM (VALIUM) 5 MG tablet SMARTSI-2 Tablet(s) By Mouth PRN    ELIQUIS 5 mg, Oral, 2 times daily    EUTHYROX 75 mcg, Oral, Daily    fluticasone-salmeterol diskus inhaler 250-50 mcg 1 puff, Inhalation    furosemide (LASIX) 20 MG tablet Oral, Daily, As needed for edema    lisinopriL (PRINIVIL,ZESTRIL) 20 mg, Oral, Daily    prednisoLONE acetate (PRED FORTE) 1 % DrpS Both Eyes    rOPINIRole (REQUIP) 3 mg, Oral, Daily    rOPINIRole (REQUIP) 1 mg, Oral, Daily    rosuvastatin (CRESTOR) 40 mg, Oral, Nightly    SPIRIVA WITH HANDIHALER 18 mcg inhalation capsule 1 capsule, Daily    SYMBICORT 160-4.5 mcg/actuation HFAA SMARTSI Puff(s) By Mouth Twice Daily       Current Inpatient Medications   albuterol  2 puff Inhalation Q6H    ascorbic acid (vitamin C)  500 mg Oral BID    dexAMETHasone  6 mg Oral Daily    fluticasone furoate-vilanteroL  1 puff Inhalation Daily    heparin (porcine)  5,000 Units Subcutaneous Q8H    levoFLOXacin  500 mg Oral Daily    multivitamin  1 tablet Oral Daily    mupirocin   Nasal BID    rOPINIRole  1 mg Oral Daily    rOPINIRole  3 mg Oral QHS         Intake/Output Summary (Last 24 hours) at 2022  Last data filed at 7/3/2022 1300  Gross per 24 hour   Intake --   Output 0 ml   Net 0 ml        Vital Signs (Most Recent):  Temp: 97.5 °F (36.4 °C) (22  0520)  Pulse: 65 (07/04/22 0520)  Resp: (!) 21 (07/04/22 0520)  BP: (!) 157/69 (07/04/22 0520)  SpO2: 97 % (07/04/22 0520)  Body mass index is 23.15 kg/m².  Weight: 57.4 kg (126 lb 8.7 oz) Vital Signs (24h Range):  Temp:  [97.5 °F (36.4 °C)-97.8 °F (36.6 °C)] 97.5 °F (36.4 °C)  Pulse:  [65-93] 65  Resp:  [18-36] 21  SpO2:  [89 %-99 %] 97 %  BP: ()/(56-90) 157/69       Lines/Drains/Airways     Central Venous Catheter Line  Duration                Hemodialysis Catheter 07/02/22 2330 right internal jugular 1 day          Peripheral Intravenous Line  Duration                Peripheral IV - Single Lumen 07/02/22 1834 20 G Anterior;Right Antecubital 1 day         Peripheral IV - Single Lumen 07/02/22 2033 20 G Anterior;Left Forearm 1 day              Significant Labs:    Lab Results   Component Value Date    WBC 8.2 07/02/2022    HGB 7.9 (L) 07/02/2022    HCT 25.5 (L) 07/02/2022    MCV 86.7 07/02/2022     07/02/2022       BMP  Lab Results   Component Value Date     07/04/2022    K 3.8 07/04/2022    CO2 24 07/04/2022    BUN 64.6 (H) 07/04/2022    CREATININE 11.01 (H) 07/04/2022    CALCIUM 8.3 (L) 07/04/2022    EGFRNONAA 4 07/04/2022       ABG  Recent Labs   Lab 07/02/22  1813   PH 7.29*   PO2 52*   PCO2 41   HCO3 19.7       Significant Imaging:  X-Ray Chest 1 View  Narrative: EXAMINATION:  XR CHEST 1 VIEW    CLINICAL HISTORY:  Unspecified complication of cardiac and vascular prosthetic device, implant and graft, initial encounter;    TECHNIQUE:  Portable AP view of the chest.    COMPARISON:  2 July 2022    FINDINGS:  Lines/tubes/devices: Interval placement of a right IJ approach dual lumen central venous catheter is noted, tip projecting over the upper SVC region.    The cardiac silhouette and central vascular structures are unchanged.  The trachea is midline. No new or worsening consolidation is identified. There is no enlarging pleural effusion or convincing pneumothorax.    Regional osseous structures  and extrathoracic soft tissues are similar.  Impression: 1. Interval right IJ central venous line placement with no adverse postprocedure findings.  2. Remaining cardiopulmonary findings are unchanged in comparison.    Electronically signed by: Yan Henderson  Date:    07/03/2022  Time:    09:45      Assessment/Plan:     Assessment  1. Reported unquantiated COPD- wheezing on admit likely with exacerbation  - followed By Dr. Anderson   2. COVID-19  3. Acute Renal Failure s/p HD cath insertion   4. Acute hypoxemic Respiratory Failure likely multi Factorial  5. Anemia s/p transfusion   6. A. Fib     Plan  1. Continue supportive care   2. With acute renal failure she will not qualify for remdesivir; if symptoms worsen and inflammatory makers are elevated can consider actemra   3. Encouraged on pronation when and if possible. Mobilization   4. DVT prophylaxis with Heparin already ordered.   5. Renal managing HD. Defer to HD to them. Renal indices trending down.    6. Continue decadron s/t wheezing; change nebs to scheduled.     DVT Prophylaxis: heparin   GI Prophylaxis: none       Maryanne Pandya, ANP  Pulmonary Critical Care Medicine  [facility] - Emergency Dept

## 2022-07-04 NOTE — PROGRESS NOTES
Chief complaint: fatigue    Interval History:  Pt had HD today with no UF, very poor blood flow due to positioning of HD catheter per HD nurse.  Pt very tired, no appetite, breathing comfortably on mask    Review of Systems   Constitutional: Positive for fatigue.   Respiratory: Positive for cough.    Cardiovascular: Negative.    Gastrointestinal: Negative.    Genitourinary: Positive for decreased urine volume.   Musculoskeletal: Negative.    Neurological: Positive for weakness.   Psychiatric/Behavioral: Negative.       all else Neg     albuterol  2 puff Inhalation Q6H    ascorbic acid (vitamin C)  500 mg Oral BID    dexAMETHasone  6 mg Oral Daily    fluticasone furoate-vilanteroL  1 puff Inhalation Daily    heparin (porcine)  5,000 Units Subcutaneous Q8H    levoFLOXacin  500 mg Oral Daily    multivitamin  1 tablet Oral Daily    mupirocin   Nasal BID    rOPINIRole  1 mg Oral Daily    rOPINIRole  3 mg Oral QHS       Objective     VITAL SIGNS: 24 HR MIN & MAX LAST    Temp  Min: 97.5 °F (36.4 °C)  Max: 97.9 °F (36.6 °C)  97.9 °F (36.6 °C)    BP  Min: 96/62  Max: 157/69  (!) 154/61     Pulse  Min: 65  Max: 92  85     Resp  Min: 18  Max: 36  19    SpO2  Min: 89 %  Max: 99 %  (!) 92 %      Wt Readings from Last 3 Encounters:   07/03/22 57.4 kg (126 lb 8.7 oz)   03/03/21 57.4 kg (126 lb 8.7 oz)       Intake/Output Summary (Last 24 hours) at 7/4/2022 1207  Last data filed at 7/4/2022 1056  Gross per 24 hour   Intake 500 ml   Output 438 ml   Net 62 ml       Physical Exam  Constitutional:       General: She is not in acute distress.  Cardiovascular:      Rate and Rhythm: Normal rate.   Pulmonary:      Effort: No respiratory distress.      Breath sounds: Wheezing and rhonchi present.   Abdominal:      General: Bowel sounds are normal.      Palpations: Abdomen is soft.      Tenderness: There is no abdominal tenderness.   Musculoskeletal:         General: Swelling present.      Cervical back: Normal range of motion.    Neurological:      Mental Status: She is alert.      Motor: Weakness present.   Psychiatric:         Mood and Affect: Mood normal.          Recent Labs     07/02/22  1523 07/03/22  0549 07/04/22  0630    135* 136   K 3.8 3.5 3.8   CHLORIDE 98 98 99   CO2 20* 20* 24   BUN 95.6* 92.4* 64.6*   CREATININE 17.52* 16.37* 11.01*   GLUCOSE 147* 166* 94   CALCIUM 8.3* 8.3* 8.3*   MG 2.30  --  1.90   PHOS  --   --  7.4*   ALBUMIN 2.7* 2.4* 2.5*      Recent Labs     07/02/22  1523   WBC 8.2   HGB 7.9*   HCT 25.5*            Assessment & Plan     1   ATN / CKD unspec - s/p HD yesterday, catheter functioning very poorly only able to achieve 200cc/min, appears positional, consult sx to eval.   Plan HD again tomorrow  2   COPD, covid+ - Decadron, nebs as per pulmonary   3   Anemia -  Hb 7.9 on 7/2, transfused 1 U, rpt CBC  4   PAD / H/o  Stents  5   RLS  6   HTN, AF - rate controlled, normotensive

## 2022-07-04 NOTE — PROGRESS NOTES
"Internal Medicine Progress Note    Subjective/Interval History:  This is a 67 yo female, who presented to the ED with increased sob over past 5 days. PMH COPD, Smoker, PAD, PVD, HTN, Depression, HLD, and RLS. Per family report, patient had angiogram of lower extremity Monday with Dr. Kaplan. Patient was having some SOB when she was at appointment and became worse over past couple of days. Patient was unable to get out of bed as she was so weak. Pt states that she only urinated once in the last 24 hours. Pt attempted to eat this am and had diarrhea. Pt does continue to smoke, however has not smoked in past 3 days. Pt was found to be COVID 19 + and in ARF on admit with a BUN/ CR of 95.6/17.52 respectively and Dr. Cadet has been consulted. Pt also found to be anemic with Hgb of 7.3. She will receive one unit of blood. Patient was vaccinated for COVID.  Pt being admit for SOB s/t COVID19 diagnosis, ARF and FVO.     7-3-22  Pt with some increased SOB during HD  Did urinate x 1 today  Labs essentially unchanged  Remains with productive cough  On 5 liters O2 sating 90%  Issues with her RLS today      7/4 : Today's info : seen and examined, no acute events overnight. Continues to improve   Sob improving  Tolerating hd    ROS:  Generalized - weakness, poor intake, fatigue,   SOB at rest  Denies abd pain  Urinated x 1 this am    Vital Signs:  BP (!) 154/61   Pulse 85   Temp 97.9 °F (36.6 °C) (Oral)   Resp 19   Ht 5' 2" (1.575 m)   Wt 57.4 kg (126 lb 8.7 oz)   SpO2 (!) 92%   Breastfeeding No   BMI 23.15 kg/m²   Body mass index is 23.15 kg/m².    Physical Exam:  Generalized - ill appearing female    HEENT - NC AT PERRLA  CVA - S1 S2, IRR  Resp - bilateral diminished, rhonchi, with mild exp wheeze, use of accessory muscles  Abd- soft, NT ND BS +  EXT - trace BLE edema  Neuro - II - XII Grossly intact  HD [facility] to [facility]    Labs:  Recent Results (from the past 48 hour(s))   COVID/FLU A&B PCR    Collection Time: 07/02/22  " 3:11 PM   Result Value Ref Range    Influenza A PCR Not Detected Not Detected    Influenza B PCR Not Detected Not Detected    SARS-CoV-2 PCR Detected (A) Not Detected   Comprehensive Metabolic Panel    Collection Time: 07/02/22  3:23 PM   Result Value Ref Range    Sodium Level 138 136 - 145 mmol/L    Potassium Level 3.8 3.5 - 5.1 mmol/L    Chloride 98 98 - 107 mmol/L    Carbon Dioxide 20 (L) 23 - 31 mmol/L    Glucose Level 147 (H) 82 - 115 mg/dL    Blood Urea Nitrogen 95.6 (H) 9.8 - 20.1 mg/dL    Creatinine 17.52 (H) 0.55 - 1.02 mg/dL    Calcium Level Total 8.3 (L) 8.4 - 10.2 mg/dL    Protein Total 6.1 5.8 - 7.6 gm/dL    Albumin Level 2.7 (L) 3.4 - 4.8 gm/dL    Globulin 3.4 2.4 - 3.5 gm/dL    Albumin/Globulin Ratio 0.8 (L) 1.1 - 2.0 ratio    Bilirubin Total 0.6 <=1.5 mg/dL    Alkaline Phosphatase 72 40 - 150 unit/L    Alanine Aminotransferase 63 (H) 0 - 55 unit/L    Aspartate Aminotransferase 65 (H) 5 - 34 unit/L    Estimated GFR-Non  2 mls/min/1.73/m2   TSH    Collection Time: 07/02/22  3:23 PM   Result Value Ref Range    Thyroid Stimulating Hormone 8.7402 (H) 0.3500 - 4.9400 uIU/mL   Troponin I    Collection Time: 07/02/22  3:23 PM   Result Value Ref Range    Troponin-I 0.088 (H) 0.000 - 0.045 ng/mL   T4, Free    Collection Time: 07/02/22  3:23 PM   Result Value Ref Range    Thyroxine Free 0.68 (L) 0.70 - 1.48 ng/dL   BNP    Collection Time: 07/02/22  3:23 PM   Result Value Ref Range    Natriuretic Peptide 226.1 (H) <=100.0 pg/mL   Magnesium    Collection Time: 07/02/22  3:23 PM   Result Value Ref Range    Magnesium Level 2.30 1.60 - 2.60 mg/dL   CBC with Differential    Collection Time: 07/02/22  3:23 PM   Result Value Ref Range    WBC 8.2 4.5 - 11.5 x10(3)/mcL    RBC 2.94 (L) 4.20 - 5.40 x10(6)/mcL    Hgb 7.9 (L) 12.0 - 16.0 gm/dL    Hct 25.5 (L) 37.0 - 47.0 %    MCV 86.7 80.0 - 94.0 fL    MCH 26.9 (L) 27.0 - 31.0 pg    MCHC 31.0 (L) 33.0 - 36.0 mg/dL    RDW 16.1 11.5 - 17.0 %    Platelet 311 130  - 400 x10(3)/mcL    MPV 10.1 7.4 - 10.4 fL    Neut % 85.7 %    Lymph % 7.7 %    Mono % 5.4 %    Eos % 0.1 %    Basophil % 0.1 %    Lymph # 0.63 0.6 - 4.6 x10(3)/mcL    Neut # 7.0 2.1 - 9.2 x10(3)/mcL    Mono # 0.44 0.1 - 1.3 x10(3)/mcL    Eos # 0.01 0 - 0.9 x10(3)/mcL    Baso # 0.01 0 - 0.2 x10(3)/mcL    IG# 0.08 (H) 0 - 0.04 x10(3)/mcL    IG% 1.0 %    NRBC% 0.0 %   Protime-INR    Collection Time: 07/02/22  3:23 PM   Result Value Ref Range    PT 12.7 12.5 - 14.5 seconds    INR 0.96 0.00 - 1.30   APTT    Collection Time: 07/02/22  3:23 PM   Result Value Ref Range    PTT 34.5 (H) 23.2 - 33.7 seconds   Hepatitis B Surface Antigen    Collection Time: 07/02/22  3:23 PM   Result Value Ref Range    Hepatitis B Surface Antigen Nonreactive Nonreactive   Prepare RBC 1 Unit    Collection Time: 07/02/22  5:39 PM   Result Value Ref Range    UNIT NUMBER [id number]     UNIT ABO/RH B POS     DISPENSE STATUS Transfused     Unit Expiration [id number]     Product Code Y6038L64     Unit Blood Type Code 7300     CROSSMATCH INTERPRETATION Compatible    Type & Screen    Collection Time: 07/02/22  5:39 PM   Result Value Ref Range    Group & Rh B POS     Indirect Chip GEL NEG    POCT ARTERIAL BLOOD GAS    Collection Time: 07/02/22  6:13 PM   Result Value Ref Range    POC PH 7.29 (A) 7.35 - 7.45    POC PCO2 41 35 - 45 mmHg    POC PO2 52 (AA) 55 - mmHg    POC HEMOGLOBIN 8.3 (A) 12 - 16 g/dL    POC SATURATED O2 81.8 %    POC O2Hb 80.8 (A) 94.0 - 97.0 %    POC COHb 1.6 %    POC MetHb 0.7 0.4 - 2 %    POC Potassium 2.9 (A) 3.5 - 5.0 mmol/l    POC Sodium 133 (A) 137 - 145 mmol/l    POC Ionized Calcium 1.06 (A) 1.12 - 1.23 mmol/l    Correct Temperature (PH) 7.29 (A) 7.35 - 7.45    Corrected Temperature (pCO2) 41 35 - 45 mmHg    Corrected Temperature (pO2) 52 (AA) 55 - mmHg    POC HCO3 19.7 mmol/l    Base Deficit -6.4 (A) -2.0 - 2.0 mmol/l    POC Temp 37.0 °C    Specimen source Arterial sample    ABORH RETYPE    Collection Time: 07/02/22   6:42 PM   Result Value Ref Range    ABORH Retype B POS    Iron and TIBC    Collection Time: 07/02/22 10:30 PM   Result Value Ref Range    Iron Binding Capacity Unsaturated 230 70 - 310 ug/dL    Iron Level 28 (L) 50 - 170 ug/dL    Transferrin 240 173 - 360 mg/dL    Iron Binding Capacity Total 258 250 - 450 ug/dL    Iron Saturation 11 (L) 20 - 50 %   Ferritin    Collection Time: 07/02/22 10:30 PM   Result Value Ref Range    Ferritin Level 115.13 4.63 - 204.00 ng/mL   Reticulocytes    Collection Time: 07/02/22 10:31 PM   Result Value Ref Range    Retic Cnt Auto 0.91 (L) 1.1 - 2.1 %    RET# 0.0299 0.016 - 0.078   Urinalysis, Reflex to Urine Culture Urine, Clean Catch    Collection Time: 07/03/22 12:42 AM    Specimen: Urine   Result Value Ref Range    Color, UA Yellow Yellow, Colorless, Other, Clear    Appearance, UA Clear Clear    Specific [place], UA 1.011 1.001 - 1.030    pH, UA 5.5 5.0, 5.5, 6.0, 6.5, 7.0, 7.5, 8.0, 8.5    Protein, UA 2+ (A) Negative, 300  mg/dL    Glucose, UA Negative Negative, Normal mg/dL    Ketones, UA Negative Negative, +1, +2, +3, +4, +5, >=160, >=80 mg/dL    Blood, UA 2+ (A) Negative unit/L    Bilirubin, UA Negative Negative mg/dL    Urobilinogen, UA 0.2 0.2, 1.0, Normal mg/dL    Nitrites, UA Negative Negative    Leukocyte Esterase, UA Trace (A) Negative, 75  unit/L   Urinalysis, Microscopic    Collection Time: 07/03/22 12:42 AM   Result Value Ref Range    RBC, UA <5 <=5 /HPF    WBC, UA <5 <=5 /HPF    Squamous Epithelial Cells, UA <5 <=5 /HPF    Bacteria, UA None Seen None Seen, Rare, Occasional /HPF   Sodium, Random Urine    Collection Time: 07/03/22 12:42 AM   Result Value Ref Range    Urine Sodium 51.0 mmol/L   Protein/Creatinine Ratio, Urine    Collection Time: 07/03/22 12:42 AM   Result Value Ref Range    Urine Protein Level 104.7 mg/dL    Urine Creatinine 100.0 47.0 - 110.0 mg/dL    Urine Protein/Creatinine Ratio 1,047.0 (H) <=200.0 mg/gm Cr   Comprehensive metabolic panel    Collection  Time: 07/03/22  5:49 AM   Result Value Ref Range    Sodium Level 135 (L) 136 - 145 mmol/L    Potassium Level 3.5 3.5 - 5.1 mmol/L    Chloride 98 98 - 107 mmol/L    Carbon Dioxide 20 (L) 23 - 31 mmol/L    Glucose Level 166 (H) 82 - 115 mg/dL    Blood Urea Nitrogen 92.4 (H) 9.8 - 20.1 mg/dL    Creatinine 16.37 (H) 0.55 - 1.02 mg/dL    Calcium Level Total 8.3 (L) 8.4 - 10.2 mg/dL    Protein Total 6.3 5.8 - 7.6 gm/dL    Albumin Level 2.4 (L) 3.4 - 4.8 gm/dL    Globulin 3.9 (H) 2.4 - 3.5 gm/dL    Albumin/Globulin Ratio 0.6 (L) 1.1 - 2.0 ratio    Bilirubin Total 0.7 <=1.5 mg/dL    Alkaline Phosphatase 66 40 - 150 unit/L    Alanine Aminotransferase 56 (H) 0 - 55 unit/L    Aspartate Aminotransferase 51 (H) 5 - 34 unit/L    Estimated GFR-Non  2 mls/min/1.73/m2   Sedimentation rate    Collection Time: 07/03/22  9:42 AM   Result Value Ref Range    Sed Rate 94 (H) 0 - 20 mm/hr   CK    Collection Time: 07/03/22  9:42 AM   Result Value Ref Range    Creatine Kinase 607 (H) 29 - 168 U/L   Lactate Dehydrogenase    Collection Time: 07/03/22  9:42 AM   Result Value Ref Range    Lactate Dehydrogenase 449 (H) 125 - 220 U/L   Lactic Acid, Plasma    Collection Time: 07/03/22  9:42 AM   Result Value Ref Range    Lactic Acid Level 0.6 0.5 - 2.2 mmol/L   PTH, Intact    Collection Time: 07/04/22  6:29 AM   Result Value Ref Range    Parathyroid Hormone Intact 212.4 (H) 8.7 - 77.0 pg/mL   Renal Function Panel    Collection Time: 07/04/22  6:30 AM   Result Value Ref Range    Sodium Level 136 136 - 145 mmol/L    Potassium Level 3.8 3.5 - 5.1 mmol/L    Chloride 99 98 - 107 mmol/L    Carbon Dioxide 24 23 - 31 mmol/L    Glucose Level 94 82 - 115 mg/dL    Blood Urea Nitrogen 64.6 (H) 9.8 - 20.1 mg/dL    Creatinine 11.01 (H) 0.55 - 1.02 mg/dL    Calcium Level Total 8.3 (L) 8.4 - 10.2 mg/dL    Albumin Level 2.5 (L) 3.4 - 4.8 gm/dL    Phosphorus Level 7.4 (H) 2.3 - 4.7 mg/dL    Estimated GFR-Non  4 mls/min/1.73/m2    Magnesium    Collection Time: 07/04/22  6:30 AM   Result Value Ref Range    Magnesium Level 1.90 1.60 - 2.60 mg/dL         Assessment/Plan:  1. SOB              Titrate oxygen to maintain sat >90%   Duo Nebs   Brio  2. COVID 19 +              Supportive therapy - unable to receive Remdesivir s/t to renal fx    On decadron  3. SYLVESTER              Dr. Rausch - renal consulted -HD today  4. Anemia              Transfusing 1 unit blood              Check stool focb              Iron studies  5. COPD - Smoker  6. FVO  7. A fib              Heparin - 5000 sq q8    Continue aggressive supportive care  Appreciate Pulmonary assistance with this patient  Appreciate JUNE Rausch assistance.    dispo : home vs ltac       Artur Ross MD

## 2022-07-05 LAB
ALBUMIN SERPL-MCNC: 2.4 GM/DL (ref 3.4–4.8)
BASOPHILS # BLD AUTO: 0.01 X10(3)/MCL (ref 0–0.2)
BASOPHILS NFR BLD AUTO: 0.1 %
BNP BLD-MCNC: 243.5 PG/ML
BUN SERPL-MCNC: 44.1 MG/DL (ref 9.8–20.1)
CALCIUM SERPL-MCNC: 8.3 MG/DL (ref 8.4–10.2)
CHLORIDE SERPL-SCNC: 101 MMOL/L (ref 98–107)
CO2 SERPL-SCNC: 21 MMOL/L (ref 23–31)
CREAT SERPL-MCNC: 7.17 MG/DL (ref 0.55–1.02)
EOSINOPHIL # BLD AUTO: 0 X10(3)/MCL (ref 0–0.9)
EOSINOPHIL NFR BLD AUTO: 0 %
ERYTHROCYTE [DISTWIDTH] IN BLOOD BY AUTOMATED COUNT: 16.4 % (ref 11.5–17)
GLUCOSE SERPL-MCNC: 94 MG/DL (ref 82–115)
HCT VFR BLD AUTO: 27.3 % (ref 37–47)
HGB BLD-MCNC: 8.1 GM/DL (ref 12–16)
IMM GRANULOCYTES # BLD AUTO: 0.09 X10(3)/MCL (ref 0–0.04)
IMM GRANULOCYTES NFR BLD AUTO: 1.3 %
LDH SERPL-CCNC: 349 U/L (ref 125–220)
LYMPHOCYTES # BLD AUTO: 0.53 X10(3)/MCL (ref 0.6–4.6)
LYMPHOCYTES NFR BLD AUTO: 7.8 %
MCH RBC QN AUTO: 27.3 PG (ref 27–31)
MCHC RBC AUTO-ENTMCNC: 29.7 MG/DL (ref 33–36)
MCV RBC AUTO: 91.9 FL (ref 80–94)
MONOCYTES # BLD AUTO: 0.23 X10(3)/MCL (ref 0.1–1.3)
MONOCYTES NFR BLD AUTO: 3.4 %
NEUTROPHILS # BLD AUTO: 5.9 X10(3)/MCL (ref 2.1–9.2)
NEUTROPHILS NFR BLD AUTO: 87.4 %
NRBC BLD AUTO-RTO: 0 %
PHOSPHATE SERPL-MCNC: 6.4 MG/DL (ref 2.3–4.7)
PLATELET # BLD AUTO: 222 X10(3)/MCL (ref 130–400)
PMV BLD AUTO: 9.7 FL (ref 7.4–10.4)
POTASSIUM SERPL-SCNC: 4.3 MMOL/L (ref 3.5–5.1)
RBC # BLD AUTO: 2.97 X10(6)/MCL (ref 4.2–5.4)
SODIUM SERPL-SCNC: 134 MMOL/L (ref 136–145)
WBC # SPEC AUTO: 6.8 X10(3)/MCL (ref 4.5–11.5)

## 2022-07-05 PROCEDURE — 25000003 PHARM REV CODE 250: Performed by: NURSE PRACTITIONER

## 2022-07-05 PROCEDURE — 83880 ASSAY OF NATRIURETIC PEPTIDE: CPT | Performed by: INTERNAL MEDICINE

## 2022-07-05 PROCEDURE — 36415 COLL VENOUS BLD VENIPUNCTURE: CPT | Performed by: NURSE PRACTITIONER

## 2022-07-05 PROCEDURE — 63600175 PHARM REV CODE 636 W HCPCS: Performed by: NURSE PRACTITIONER

## 2022-07-05 PROCEDURE — 63600175 PHARM REV CODE 636 W HCPCS: Mod: JG | Performed by: INTERNAL MEDICINE

## 2022-07-05 PROCEDURE — 25000003 PHARM REV CODE 250: Performed by: INTERNAL MEDICINE

## 2022-07-05 PROCEDURE — 82310 ASSAY OF CALCIUM: CPT | Performed by: INTERNAL MEDICINE

## 2022-07-05 PROCEDURE — 36415 COLL VENOUS BLD VENIPUNCTURE: CPT | Performed by: INTERNAL MEDICINE

## 2022-07-05 PROCEDURE — 83615 LACTATE (LD) (LDH) ENZYME: CPT | Performed by: NURSE PRACTITIONER

## 2022-07-05 PROCEDURE — 63600175 PHARM REV CODE 636 W HCPCS

## 2022-07-05 PROCEDURE — 80100014 HC HEMODIALYSIS 1:1

## 2022-07-05 PROCEDURE — 27000221 HC OXYGEN, UP TO 24 HOURS

## 2022-07-05 PROCEDURE — 11000001 HC ACUTE MED/SURG PRIVATE ROOM

## 2022-07-05 PROCEDURE — 85025 COMPLETE CBC W/AUTO DIFF WBC: CPT | Performed by: INTERNAL MEDICINE

## 2022-07-05 PROCEDURE — 27000207 HC ISOLATION

## 2022-07-05 RX ADMIN — MULTIPLE VITAMINS W/ MINERALS TAB 1 TABLET: TAB at 05:07

## 2022-07-05 RX ADMIN — MUPIROCIN: 20 OINTMENT TOPICAL at 05:07

## 2022-07-05 RX ADMIN — OXYCODONE HYDROCHLORIDE AND ACETAMINOPHEN 500 MG: 500 TABLET ORAL at 05:07

## 2022-07-05 RX ADMIN — FLUTICASONE FUROATE AND VILANTEROL TRIFENATATE 1 PUFF: 100; 25 POWDER RESPIRATORY (INHALATION) at 05:07

## 2022-07-05 RX ADMIN — LEVOFLOXACIN 500 MG: 500 TABLET, FILM COATED ORAL at 05:07

## 2022-07-05 RX ADMIN — ROPINIROLE HYDROCHLORIDE 3 MG: 1 TABLET, FILM COATED ORAL at 08:07

## 2022-07-05 RX ADMIN — DEXAMETHASONE 6 MG: 0.5 TABLET ORAL at 05:07

## 2022-07-05 RX ADMIN — ERYTHROPOIETIN 20000 UNITS: 10000 INJECTION, SOLUTION INTRAVENOUS; SUBCUTANEOUS at 05:07

## 2022-07-05 RX ADMIN — ALBUTEROL SULFATE 2 PUFF: 90 AEROSOL, METERED RESPIRATORY (INHALATION) at 12:07

## 2022-07-05 RX ADMIN — HEPARIN SODIUM 5000 UNITS: 5000 INJECTION, SOLUTION INTRAVENOUS; SUBCUTANEOUS at 05:07

## 2022-07-05 RX ADMIN — HEPARIN SODIUM 5000 UNITS: 5000 INJECTION, SOLUTION INTRAVENOUS; SUBCUTANEOUS at 12:07

## 2022-07-05 RX ADMIN — ALBUTEROL SULFATE 2 PUFF: 90 AEROSOL, METERED RESPIRATORY (INHALATION) at 05:07

## 2022-07-05 NOTE — PROGRESS NOTES
"Internal Medicine Progress Note    Subjective/Interval History:  This is a 67 yo female, who presented to the ED with increased sob over past 5 days. PMH COPD, Smoker, PAD, PVD, HTN, Depression, HLD, and RLS. Per family report, patient had angiogram of lower extremity Monday with Dr. Kaplan. Patient was having some SOB when she was at appointment and became worse over past couple of days. Patient was unable to get out of bed as she was so weak. Pt states that she only urinated once in the last 24 hours. Pt attempted to eat this am and had diarrhea. Pt does continue to smoke, however has not smoked in past 3 days. Pt was found to be COVID 19 + and in ARF on admit with a BUN/ CR of 95.6/17.52 respectively and Dr. Cadet has been consulted. Pt also found to be anemic with Hgb of 7.3. She will receive one unit of blood. Patient was vaccinated for COVID.  Pt being admit for SOB s/t COVID19 diagnosis, ARF and FVO.     7-3-22  Pt with some increased SOB during HD  Did urinate x 1 today  Labs essentially unchanged  Remains with productive cough  On 5 liters O2 sating 90%  Issues with her RLS today      7/5 : Today's info : seen and examined, no acute events overnight. Continues to improve   Sob improving  Tolerating hd  h and h down    ROS:  Generalized - weakness, poor intake, fatigue,   SOB at rest  Denies abd pain  Urinated x 1 this am    Vital Signs:  /77   Pulse 70   Temp 97.7 °F (36.5 °C) (Oral)   Resp (!) 21   Ht 5' 2" (1.575 m)   Wt 57.4 kg (126 lb 8.7 oz)   SpO2 96%   Breastfeeding No   BMI 23.15 kg/m²   Body mass index is 23.15 kg/m².    Physical Exam:  Generalized - ill appearing female    HEENT - NC AT PERRLA  CVA - S1 S2, IRR  Resp - bilateral diminished, rhonchi, with mild exp wheeze, use of accessory muscles  Abd- soft, NT ND BS +  EXT - trace BLE edema  Neuro - II - XII Grossly intact  HD Cath to RIJ    Labs:  Recent Results (from the past 48 hour(s))   PTH, Intact    Collection Time: 07/04/22 "  6:29 AM   Result Value Ref Range    Parathyroid Hormone Intact 212.4 (H) 8.7 - 77.0 pg/mL   Renal Function Panel    Collection Time: 07/04/22  6:30 AM   Result Value Ref Range    Sodium Level 136 136 - 145 mmol/L    Potassium Level 3.8 3.5 - 5.1 mmol/L    Chloride 99 98 - 107 mmol/L    Carbon Dioxide 24 23 - 31 mmol/L    Glucose Level 94 82 - 115 mg/dL    Blood Urea Nitrogen 64.6 (H) 9.8 - 20.1 mg/dL    Creatinine 11.01 (H) 0.55 - 1.02 mg/dL    Calcium Level Total 8.3 (L) 8.4 - 10.2 mg/dL    Albumin Level 2.5 (L) 3.4 - 4.8 gm/dL    Phosphorus Level 7.4 (H) 2.3 - 4.7 mg/dL    Estimated GFR-Non  4 mls/min/1.73/m2   Magnesium    Collection Time: 07/04/22  6:30 AM   Result Value Ref Range    Magnesium Level 1.90 1.60 - 2.60 mg/dL   Renal Function Panel    Collection Time: 07/05/22  3:31 AM   Result Value Ref Range    Sodium Level 134 (L) 136 - 145 mmol/L    Potassium Level 4.3 3.5 - 5.1 mmol/L    Chloride 101 98 - 107 mmol/L    Carbon Dioxide 21 (L) 23 - 31 mmol/L    Glucose Level 94 82 - 115 mg/dL    Blood Urea Nitrogen 44.1 (H) 9.8 - 20.1 mg/dL    Creatinine 7.17 (H) 0.55 - 1.02 mg/dL    Calcium Level Total 8.3 (L) 8.4 - 10.2 mg/dL    Albumin Level 2.4 (L) 3.4 - 4.8 gm/dL    Phosphorus Level 6.4 (H) 2.3 - 4.7 mg/dL    Estimated GFR-Non  6 mls/min/1.73/m2   BNP    Collection Time: 07/05/22  3:31 AM   Result Value Ref Range    Natriuretic Peptide 243.5 (H) <=100.0 pg/mL   CBC with Differential    Collection Time: 07/05/22  3:31 AM   Result Value Ref Range    WBC 6.8 4.5 - 11.5 x10(3)/mcL    RBC 2.97 (L) 4.20 - 5.40 x10(6)/mcL    Hgb 8.1 (L) 12.0 - 16.0 gm/dL    Hct 27.3 (L) 37.0 - 47.0 %    MCV 91.9 80.0 - 94.0 fL    MCH 27.3 27.0 - 31.0 pg    MCHC 29.7 (L) 33.0 - 36.0 mg/dL    RDW 16.4 11.5 - 17.0 %    Platelet 222 130 - 400 x10(3)/mcL    MPV 9.7 7.4 - 10.4 fL    Neut % 87.4 %    Lymph % 7.8 %    Mono % 3.4 %    Eos % 0.0 %    Basophil % 0.1 %    Lymph # 0.53 (L) 0.6 - 4.6 x10(3)/mcL     Neut # 5.9 2.1 - 9.2 x10(3)/mcL    Mono # 0.23 0.1 - 1.3 x10(3)/mcL    Eos # 0.00 0 - 0.9 x10(3)/mcL    Baso # 0.01 0 - 0.2 x10(3)/mcL    IG# 0.09 (H) 0 - 0.04 x10(3)/mcL    IG% 1.3 %    NRBC% 0.0 %   Lactate Dehydrogenase    Collection Time: 07/05/22  9:32 AM   Result Value Ref Range    Lactate Dehydrogenase 349 (H) 125 - 220 U/L         Assessment/Plan:  1. SOB              Titrate oxygen to maintain sat >90%   Duo Nebs   Brio  2. COVID 19 +              Supportive therapy - unable to receive Remdesivir s/t to renal fx    On decadron  3. SYLVESTER              Dr. Rausch - renal consulted -HD today  4. Anemia              Transfusing 1 unit blood              Check stool focb              Iron studies  5. COPD - Smoker  6. FVO  7. A fib              Heparin - 5000 sq q8    Continue aggressive supportive care  Renal and pulm reccs  actemra if worse respi status    dispo : home vs ltac       Artur Ross MD

## 2022-07-05 NOTE — PROGRESS NOTES
07/05/22 1457   Post-Hemodialysis Assessment   Blood Volume Processed (Liters) 48.5 L   Dialyzer Clearance Moderately streaked   Duration of Treatment 180 minutes   Additional Fluid Intake (mL) 500 mL   Total UF (mL) 1500 mL   Net Fluid Removal 1000   Patient Response to Treatment Tx tolerated, lines flushed and packed with saline to filling volume with new end caps   Post-Hemodialysis Comments no distress noted

## 2022-07-05 NOTE — PLAN OF CARE
Spoke to patient and her sister about LTAC placement they are in agreement. Referral sent to [facility]

## 2022-07-05 NOTE — PROCEDURES
"Natalia Bower is a 66 y.o. female patient.    Temp: 97.4 °F (36.3 °C) (07/04/22 1616)  Pulse: 78 (07/04/22 1712)  Resp: 18 (07/04/22 1712)  BP: 104/61 (07/04/22 1616)  SpO2: 97 % (07/04/22 1650)  Weight: 57.4 kg (126 lb 8.7 oz) (07/03/22 1700)  Height: 5' 2" (157.5 cm) (07/03/22 1700)       Central Line Replacement    Date/Time: 7/4/2022 7:05 PM  Performed by: Valdemar Augustin MD  Authorized by: Valdemar Augustin MD     Location procedure was performed:  [address] SURGICAL UNIT  Pre-operative diagnosis:  Renal Failure  Post-operative diagnosis:  Renal Failure  Consent Done ?:  Yes  Time out complete?: Verified correct patient, procedure, equipment, staff, and site/side    Indications:  Hemodialysis  Description of findings:  Catheter exchanged for 12Fr x16cm catheter  Preparation:  Skin prepped with ChloraPrep  Skin prep agent dried: Skin prep agent completely dried prior to procedure    Sterile barriers: All five maximal sterile barriers used - gloves, gown, cap, mask and large sterile sheet    Location:  Right internal jugular  Catheter type:  Triple lumen  Catheter size:  12 Fr  Inserted Catheter Length (cm):  16  Ultrasound guidance: No    Manometry: No    Number of attempts:  1  Securement:  Line sutured, blood return through all ports, chlorhexidine patch and sterile dressing applied  Technical Procedures Used:  Over wire exchange  XRay:  Placement verified by x-ray, no pneumothorax on x-ray, tip termination and successful placement  Adverse Events:  None        7/4/2022    "

## 2022-07-05 NOTE — PROGRESS NOTES
[facility] - Emergency Dept  Pulmonary Critical Care Note    Patient Name: Natalia Bower  MRN: [id number]  Admission Date: 7/2/2022  Hospital Length of Stay: 3 days  Code Status: Full Code  Attending Provider: Artur Ross MD  Primary Care Provider: Nohemy Burgess MD     Subjective:     HPI:   This is a 67 yo female, who presented to the ED at [facility] with increased sob over past 5 days. She is a smoker with PMH of COPD, PAD, PVD, HTN, Depression, HLD, and RLS. Per family report, patient had angiogram of lower extremity Monday with Dr. Kaplan. Patient was having some SOB when she was at appointment which gradually worsened over the last several days. The patient also had complaints of significant weakness and decreased urination with diarrhea. Workup revealed Acute renal failure with a BUN/ CR of 95.6/17.52 and  was found to be positive for covid 19. Dr. Cadet has been consulted. Pt also found to be anemic with Hgb of 7.3 and was transfused one unit of blood. Patient was vaccinated for COVID.     Chest x-ray showed trace right-sided pleural effusion with bibasilar interstitial prominence.  Upon review of the patient's vital signs she has been afebrile O2 saturations ranged anywhere from lowest documented sat of 92% as high as 97% on 5 L via oxymask.     Hospital Course/Significant events:  Trialysis cath placement - Dr. Samuel Miller 7/2/22- underwent first HD treatment on 7/3/2022    24 Hour Interval History:  Remains on Oxymask at 4L. Otherwise, no significant changes to be reported this morning. She still has significant wheezing that is audible from across the floor.     Past Medical History:   Diagnosis Date    COPD (chronic obstructive pulmonary disease)     Depression     HLD (hyperlipidemia)     Hypertension     PAD (peripheral artery disease)     PVD (peripheral vascular disease)     RLS (restless legs syndrome)        No past surgical history on file.    Social History      Socioeconomic History    Marital status: Single   Tobacco Use    Smoking status: Current Every Day Smoker     Types: Cigarettes     2ppd smoker since teenager.       Objective:     Current Outpatient Medications   Medication Instructions    albuterol (PROVENTIL/VENTOLIN HFA) 90 mcg/actuation inhaler SMARTSI Puff(s) By Mouth Every 4 Hours PRN    amLODIPine (NORVASC) 5 mg, Oral, Daily    aspirin (ECOTRIN) 81 mg, Oral    buPROPion (WELLBUTRIN SR) 150 mg, Oral, 2 times daily    clopidogreL (PLAVIX) 75 mg, Oral, Daily    co-enzyme Q-10 100 mg, Oral, Daily    DECARA 50,000 Units, Oral, Every 7 days    diazePAM (VALIUM) 5 MG tablet SMARTSI-2 Tablet(s) By Mouth PRN    ELIQUIS 5 mg, Oral, 2 times daily    EUTHYROX 75 mcg, Oral, Daily    fluticasone-salmeterol diskus inhaler 250-50 mcg 1 puff, Inhalation    furosemide (LASIX) 20 MG tablet Oral, Daily, As needed for edema    lisinopriL (PRINIVIL,ZESTRIL) 20 mg, Oral, Daily    prednisoLONE acetate (PRED FORTE) 1 % DrpS Both Eyes    rOPINIRole (REQUIP) 3 mg, Oral, Daily    rOPINIRole (REQUIP) 1 mg, Oral, Daily    rosuvastatin (CRESTOR) 40 mg, Oral, Nightly    SPIRIVA WITH HANDIHALER 18 mcg inhalation capsule 1 capsule, Daily    SYMBICORT 160-4.5 mcg/actuation HFAA SMARTSI Puff(s) By Mouth Twice Daily       Current Inpatient Medications   albuterol  2 puff Inhalation Q6H    ascorbic acid (vitamin C)  500 mg Oral BID    dexAMETHasone  6 mg Oral Daily    fluticasone furoate-vilanteroL  1 puff Inhalation Daily    heparin (porcine)  5,000 Units Subcutaneous Q8H    levoFLOXacin  500 mg Oral Daily    multivitamin  1 tablet Oral Daily    mupirocin   Nasal BID    rOPINIRole  1 mg Oral Daily    rOPINIRole  3 mg Oral QHS         Intake/Output Summary (Last 24 hours) at 2022 0846  Last data filed at 2022 1056  Gross per 24 hour   Intake 500 ml   Output 438 ml   Net 62 ml        Vital Signs (Most Recent):  Temp: 97.7 °F (36.5 °C) (22  0834)  Pulse: 70 (07/05/22 0834)  Resp: (!) 21 (07/05/22 0834)  BP: 124/77 (07/05/22 0834)  SpO2: 96 % (07/05/22 0834)  Body mass index is 23.15 kg/m².  Weight: 57.4 kg (126 lb 8.7 oz) Vital Signs (24h Range):  Temp:  [97.1 °F (36.2 °C)-97.9 °F (36.6 °C)] 97.7 °F (36.5 °C)  Pulse:  [65-95] 70  Resp:  [18-21] 21  SpO2:  [92 %-99 %] 96 %  BP: (104-154)/(61-77) 124/77       Lines/Drains/Airways     Central Venous Catheter Line  Duration                Hemodialysis Catheter 07/02/22 2330 right internal jugular 2 days          Peripheral Intravenous Line  Duration                Peripheral IV - Single Lumen 07/02/22 1834 20 G Anterior;Right Antecubital 2 days         Peripheral IV - Single Lumen 07/02/22 2033 20 G Anterior;Left Forearm 2 days              Significant Labs:    Lab Results   Component Value Date    WBC 6.8 07/05/2022    HGB 8.1 (L) 07/05/2022    HCT 27.3 (L) 07/05/2022    MCV 91.9 07/05/2022     07/05/2022       BMP  Lab Results   Component Value Date     (L) 07/05/2022    K 4.3 07/05/2022    CO2 21 (L) 07/05/2022    BUN 44.1 (H) 07/05/2022    CREATININE 7.17 (H) 07/05/2022    CALCIUM 8.3 (L) 07/05/2022    EGFRNONAA 6 07/05/2022     Physical Exam  Vitals and nursing note reviewed.   Constitutional:       General: She is not in acute distress.     Appearance: Normal appearance. She is normal weight. She is not ill-appearing.   HENT:      Head: Normocephalic and atraumatic.      Mouth/Throat:      Mouth: Mucous membranes are moist.      Pharynx: Oropharynx is clear.   Eyes:      Extraocular Movements: Extraocular movements intact.      Conjunctiva/sclera: Conjunctivae normal.      Pupils: Pupils are equal, round, and reactive to light.   Cardiovascular:      Rate and Rhythm: Normal rate and regular rhythm.      Pulses: Normal pulses.      Heart sounds: Normal heart sounds. No murmur heard.    No gallop.   Pulmonary:      Breath sounds: Wheezing present. No rhonchi.   Abdominal:      General:  Abdomen is flat.   Neurological:      Mental Status: She is alert.           ABG  Recent Labs   Lab 07/02/22  1813   PH 7.29*   PO2 52*   PCO2 41   HCO3 19.7       Significant Imaging:  X-Ray Chest 1 View  Narrative: EXAMINATION:  XR CHEST 1 VIEW    CLINICAL HISTORY:  R IJ line replacement.;    TECHNIQUE:  One view    COMPARISON:  July 3, 2022    FINDINGS:  Right transjugular central venous catheter terminates within the distal superior vena cava.  No pneumothorax.  Otherwise, no significant interval change  Impression: Optimal placement of the central line.    Electronically signed by: Kash Sharma  Date:    07/04/2022  Time:    16:53      Assessment/Plan:     Assessment  1. Reported unquantiated COPD- wheezing on admit likely with exacerbation  - followed By Dr. Anderson   2. COVID-19  3. Acute Renal Failure s/p HD cath insertion   4. Acute hypoxemic Respiratory Failure likely multi Factorial  5. Anemia s/p transfusion   6. A. Fib     Plan  1. Continue supportive care   2. Actemra for worsening resp status. Does not qualify for Remdisivir s/t RF  3. Encouraged on pronation when and if possible. Mobilization   4. Renal indices trending down.  Nephrology following  5. No new inflammatory markers to trend since 7/2. However, white count low normal without leukocytosis secondary to steroids  6. Continues to be on Decadron Day 3    DVT Prophylaxis: heparin   GI Prophylaxis: none       Tim Ernst DO  Pulmonary Critical Care Medicine  [facility]

## 2022-07-05 NOTE — PROGRESS NOTES
Chief complaint: none    Interval History:  Pt notes urinating twice yesterday, once this morning.  Appetite starting to improve.  She is breathing comfortably on NC    Review of Systems   Constitutional: Negative.    HENT: Negative.    Respiratory: Positive for wheezing (mild).    Cardiovascular: Negative.    Gastrointestinal: Negative.    Genitourinary: Positive for decreased urine volume.   Musculoskeletal: Negative.    Neurological: Positive for weakness.   Psychiatric/Behavioral: Negative.       all else Neg     albuterol  2 puff Inhalation Q6H    ascorbic acid (vitamin C)  500 mg Oral BID    dexAMETHasone  6 mg Oral Daily    fluticasone furoate-vilanteroL  1 puff Inhalation Daily    heparin (porcine)  5,000 Units Subcutaneous Q8H    levoFLOXacin  500 mg Oral Daily    multivitamin  1 tablet Oral Daily    mupirocin   Nasal BID    rOPINIRole  1 mg Oral Daily    rOPINIRole  3 mg Oral QHS       Objective     VITAL SIGNS: 24 HR MIN & MAX LAST    Temp  Min: 97.1 °F (36.2 °C)  Max: 97.9 °F (36.6 °C)  97.7 °F (36.5 °C)    BP  Min: 104/61  Max: 154/61  124/77     Pulse  Min: 65  Max: 95  70     Resp  Min: 18  Max: 21  (!) 21    SpO2  Min: 92 %  Max: 99 %  96 %      Wt Readings from Last 3 Encounters:   07/03/22 57.4 kg (126 lb 8.7 oz)   03/03/21 57.4 kg (126 lb 8.7 oz)   from pulmonary MD (COVID)  Constitutional:       General: She is not in acute distress.     Appearance: Normal appearance. She is normal weight. She is not ill-appearing.   HENT:      Head: Normocephalic and atraumatic.      Mouth/Throat:      Mouth: Mucous membranes are moist.      Pharynx: Oropharynx is clear.   Eyes:      Extraocular Movements: Extraocular movements intact.      Conjunctiva/sclera: Conjunctivae normal.      Pupils: Pupils are equal, round, and reactive to light.   Cardiovascular:      Rate and Rhythm: Normal rate and regular rhythm.      Pulses: Normal pulses.      Heart sounds: Normal heart sounds. No murmur heard.     No gallop.   Pulmonary:      Breath sounds: Wheezing present. No rhonchi.   Abdominal:      General: Abdomen is flat.   Neurological:      Mental Status: She is alert.     Intake/Output Summary (Last 24 hours) at 7/5/2022 0925  Last data filed at 7/4/2022 1056  Gross per 24 hour   Intake 500 ml   Output 438 ml   Net 62 ml       Recent Labs     07/03/22  0549 07/03/22  0549 07/04/22  0630 07/05/22  0331   *  --  136 134*   K 3.5  --  3.8 4.3   CHLORIDE 98  --  99 101   CO2 20*  --  24 21*   BUN 92.4*  --  64.6* 44.1*   CREATININE 16.37*  --  11.01* 7.17*   GLUCOSE 166*  --  94 94   CALCIUM 8.3*  --  8.3* 8.3*   MG  --   --  1.90  --    PHOS  --    < > 7.4* 6.4*   ALBUMIN 2.4*  --  2.5* 2.4*    < > = values in this interval not displayed.      Recent Labs     07/02/22  1523 07/05/22  0331   WBC 8.2 6.8   HGB 7.9* 8.1*   HCT 25.5* 27.3*    222         Assessment & Plan     1   ATN / CKD unspec - s/p HD yesterday, catheter functioned very poorly, repositioned by sx yesterday.   Uout appears to remain poor, will try HD again today, UF as tolerated  2   COPD, covid+ - Decadron, nebs as per pulmonary   3   Anemia -  Hb up 7.9 to 8/1 p 1 U RBC transfusion on 7/2, will order EPO 20K U,  additional RBC's as per primary  4   PAD / H/o  Stents  5   RLS  6   HTN, AF - rate controlled, normotensive

## 2022-07-06 LAB
ALBUMIN SERPL-MCNC: 2.5 GM/DL (ref 3.4–4.8)
BASOPHILS # BLD AUTO: 0 X10(3)/MCL (ref 0–0.2)
BASOPHILS NFR BLD AUTO: 0 %
BUN SERPL-MCNC: 32.8 MG/DL (ref 9.8–20.1)
CALCIUM SERPL-MCNC: 8.7 MG/DL (ref 8.4–10.2)
CHLORIDE SERPL-SCNC: 104 MMOL/L (ref 98–107)
CO2 SERPL-SCNC: 24 MMOL/L (ref 23–31)
CREAT SERPL-MCNC: 5.27 MG/DL (ref 0.55–1.02)
EOSINOPHIL # BLD AUTO: 0 X10(3)/MCL (ref 0–0.9)
EOSINOPHIL NFR BLD AUTO: 0 %
ERYTHROCYTE [DISTWIDTH] IN BLOOD BY AUTOMATED COUNT: 15.9 % (ref 11.5–17)
GLUCOSE SERPL-MCNC: 103 MG/DL (ref 82–115)
HBV SURFACE AB SER QL IA: NEGATIVE
HBV SURFACE AB SERPL IA-ACNC: <5 MIU/ML
HCT VFR BLD AUTO: 28.1 % (ref 37–47)
HGB BLD-MCNC: 8.7 GM/DL (ref 12–16)
IMM GRANULOCYTES # BLD AUTO: 0.13 X10(3)/MCL (ref 0–0.04)
IMM GRANULOCYTES NFR BLD AUTO: 2.5 %
LYMPHOCYTES # BLD AUTO: 0.42 X10(3)/MCL (ref 0.6–4.6)
LYMPHOCYTES NFR BLD AUTO: 8.2 %
MCH RBC QN AUTO: 26.9 PG (ref 27–31)
MCHC RBC AUTO-ENTMCNC: 31 MG/DL (ref 33–36)
MCV RBC AUTO: 87 FL (ref 80–94)
MONOCYTES # BLD AUTO: 0.16 X10(3)/MCL (ref 0.1–1.3)
MONOCYTES NFR BLD AUTO: 3.1 %
NEUTROPHILS # BLD AUTO: 4.4 X10(3)/MCL (ref 2.1–9.2)
NEUTROPHILS NFR BLD AUTO: 86.2 %
NRBC BLD AUTO-RTO: 0 %
PHOSPHATE SERPL-MCNC: 4 MG/DL (ref 2.3–4.7)
PLATELET # BLD AUTO: 193 X10(3)/MCL (ref 130–400)
PMV BLD AUTO: 9.2 FL (ref 7.4–10.4)
POTASSIUM SERPL-SCNC: 4.2 MMOL/L (ref 3.5–5.1)
RBC # BLD AUTO: 3.23 X10(6)/MCL (ref 4.2–5.4)
SODIUM SERPL-SCNC: 136 MMOL/L (ref 136–145)
WBC # SPEC AUTO: 5.1 X10(3)/MCL (ref 4.5–11.5)

## 2022-07-06 PROCEDURE — 63600175 PHARM REV CODE 636 W HCPCS: Performed by: INTERNAL MEDICINE

## 2022-07-06 PROCEDURE — 25000003 PHARM REV CODE 250: Performed by: INTERNAL MEDICINE

## 2022-07-06 PROCEDURE — 63600175 PHARM REV CODE 636 W HCPCS

## 2022-07-06 PROCEDURE — 25000003 PHARM REV CODE 250: Performed by: NURSE PRACTITIONER

## 2022-07-06 PROCEDURE — 36415 COLL VENOUS BLD VENIPUNCTURE: CPT | Performed by: INTERNAL MEDICINE

## 2022-07-06 PROCEDURE — 85025 COMPLETE CBC W/AUTO DIFF WBC: CPT | Performed by: INTERNAL MEDICINE

## 2022-07-06 PROCEDURE — 27000207 HC ISOLATION

## 2022-07-06 PROCEDURE — 94761 N-INVAS EAR/PLS OXIMETRY MLT: CPT

## 2022-07-06 PROCEDURE — 11000001 HC ACUTE MED/SURG PRIVATE ROOM

## 2022-07-06 PROCEDURE — 80069 RENAL FUNCTION PANEL: CPT | Performed by: INTERNAL MEDICINE

## 2022-07-06 PROCEDURE — 25000242 PHARM REV CODE 250 ALT 637 W/ HCPCS: Performed by: INTERNAL MEDICINE

## 2022-07-06 PROCEDURE — 27000221 HC OXYGEN, UP TO 24 HOURS

## 2022-07-06 RX ORDER — DEXAMETHASONE SODIUM PHOSPHATE 4 MG/ML
6 INJECTION, SOLUTION INTRA-ARTICULAR; INTRALESIONAL; INTRAMUSCULAR; INTRAVENOUS; SOFT TISSUE DAILY
Status: DISCONTINUED | OUTPATIENT
Start: 2022-07-06 | End: 2022-07-14 | Stop reason: HOSPADM

## 2022-07-06 RX ADMIN — OXYCODONE HYDROCHLORIDE AND ACETAMINOPHEN 500 MG: 500 TABLET ORAL at 10:07

## 2022-07-06 RX ADMIN — ALBUTEROL SULFATE 2 PUFF: 90 AEROSOL, METERED RESPIRATORY (INHALATION) at 04:07

## 2022-07-06 RX ADMIN — HEPARIN SODIUM 5000 UNITS: 5000 INJECTION, SOLUTION INTRAVENOUS; SUBCUTANEOUS at 12:07

## 2022-07-06 RX ADMIN — ROPINIROLE HYDROCHLORIDE 1 MG: 1 TABLET, FILM COATED ORAL at 10:07

## 2022-07-06 RX ADMIN — LEVOFLOXACIN 500 MG: 500 TABLET, FILM COATED ORAL at 10:07

## 2022-07-06 RX ADMIN — HEPARIN SODIUM 5000 UNITS: 5000 INJECTION, SOLUTION INTRAVENOUS; SUBCUTANEOUS at 04:07

## 2022-07-06 RX ADMIN — HEPARIN SODIUM 5000 UNITS: 5000 INJECTION, SOLUTION INTRAVENOUS; SUBCUTANEOUS at 10:07

## 2022-07-06 RX ADMIN — MULTIPLE VITAMINS W/ MINERALS TAB 1 TABLET: TAB at 10:07

## 2022-07-06 RX ADMIN — ALBUTEROL SULFATE 2 PUFF: 90 AEROSOL, METERED RESPIRATORY (INHALATION) at 12:07

## 2022-07-06 RX ADMIN — ACETAMINOPHEN 650 MG: 325 TABLET, FILM COATED ORAL at 10:07

## 2022-07-06 RX ADMIN — BENZONATATE 100 MG: 100 CAPSULE ORAL at 10:07

## 2022-07-06 RX ADMIN — FLUTICASONE FUROATE AND VILANTEROL TRIFENATATE 1 PUFF: 100; 25 POWDER RESPIRATORY (INHALATION) at 12:07

## 2022-07-06 RX ADMIN — ALBUTEROL SULFATE 2 PUFF: 90 AEROSOL, METERED RESPIRATORY (INHALATION) at 05:07

## 2022-07-06 RX ADMIN — MUPIROCIN: 20 OINTMENT TOPICAL at 10:07

## 2022-07-06 RX ADMIN — MUPIROCIN: 20 OINTMENT TOPICAL at 08:07

## 2022-07-06 RX ADMIN — ROPINIROLE HYDROCHLORIDE 3 MG: 1 TABLET, FILM COATED ORAL at 08:07

## 2022-07-06 RX ADMIN — DEXAMETHASONE SODIUM PHOSPHATE 6 MG: 4 INJECTION, SOLUTION INTRA-ARTICULAR; INTRALESIONAL; INTRAMUSCULAR; INTRAVENOUS; SOFT TISSUE at 12:07

## 2022-07-06 RX ADMIN — OXYCODONE HYDROCHLORIDE AND ACETAMINOPHEN 500 MG: 500 TABLET ORAL at 08:07

## 2022-07-06 NOTE — PROGRESS NOTES
Chief complaint: none  Interval History:  Pt feeling better, appetite is back, no SOB on O2 mask, ambulating to bathroom, only urinated once yesterday    Review of Systems   Constitutional: Negative.    HENT: Negative.    Respiratory: Negative.    Gastrointestinal: Negative.    Genitourinary: Positive for decreased urine volume.   Musculoskeletal: Negative.    Neurological: Negative.    Psychiatric/Behavioral: Negative.       all else Neg     albuterol  2 puff Inhalation Q6H    ascorbic acid (vitamin C)  500 mg Oral BID    dexamethasone  6 mg Intravenous Daily    fluticasone furoate-vilanteroL  1 puff Inhalation Daily    heparin (porcine)  5,000 Units Subcutaneous Q8H    levoFLOXacin  500 mg Oral Daily    multivitamin  1 tablet Oral Daily    mupirocin   Nasal BID    rOPINIRole  1 mg Oral Daily    rOPINIRole  3 mg Oral QHS       Objective     VITAL SIGNS: 24 HR MIN & MAX LAST    Temp  Min: 97.6 °F (36.4 °C)  Max: 97.9 °F (36.6 °C)  97.7 °F (36.5 °C)    BP  Min: 100/64  Max: 155/61  127/77     Pulse  Min: 65  Max: 73  65     Resp  Min: 17  Max: 20  18    SpO2  Min: 94 %  Max: 97 %  96 %      Wt Readings from Last 3 Encounters:   07/03/22 57.4 kg (126 lb 8.7 oz)   03/03/21 57.4 kg (126 lb 8.7 oz)       Intake/Output Summary (Last 24 hours) at 7/6/2022 1219  Last data filed at 7/6/2022 0600  Gross per 24 hour   Intake 620 ml   Output 1501 ml   Net -881 ml       Physical Exam  Constitutional:       General: She is not in acute distress.  Neck:      Comments: Temp HD cath R IJ  Cardiovascular:      Rate and Rhythm: Normal rate.   Pulmonary:      Effort: Pulmonary effort is normal. No respiratory distress.   Abdominal:      General: Bowel sounds are normal.      Palpations: Abdomen is soft.      Tenderness: There is no abdominal tenderness.   Musculoskeletal:         General: No swelling.   Neurological:      Mental Status: She is alert and oriented to person, place, and time.          Recent Labs      07/04/22  0630 07/05/22  0331 07/06/22  0521    134* 136   K 3.8 4.3 4.2   CHLORIDE 99 101 104   CO2 24 21* 24   BUN 64.6* 44.1* 32.8*   CREATININE 11.01* 7.17* 5.27*   GLUCOSE 94 94 103   CALCIUM 8.3* 8.3* 8.7   MG 1.90  --   --    PHOS 7.4* 6.4* 4.0   ALBUMIN 2.5* 2.4* 2.5*      Recent Labs     07/05/22  0331 07/06/22  0521   WBC 6.8 5.1   HGB 8.1* 8.7*   HCT 27.3* 28.1*    193         Assessment & Plan     1   ATN / CKD unspec - s/p HD yesterday with 1.5 L UF, no recorded Uout, plan  HD again tomorrow.  If labs tomorrow show continued poor clearance, will ask sx to place tunneled HD cath  2   COPD, covid+ - Decadron, nebs as per pulmonary   3   Anemia -  Hb up 7.9 to 8.7 p 1 U RBC transfusion on 7/2, EPO 20K U given 7/5,  monitor H/H  4   PAD / H/o  Stents  5   RLS  6   HTN, AF - rate controlled, normotensive

## 2022-07-06 NOTE — PROGRESS NOTES
"Internal Medicine Progress Note    Subjective/Interval History:  This is a 67 yo female, who presented to the ED with increased sob over past 5 days. PMH COPD, Smoker, PAD, PVD, HTN, Depression, HLD, and RLS. Per family report, patient had angiogram of lower extremity Monday with Dr. Kaplan. Patient was having some SOB when she was at appointment and became worse over past couple of days. Patient was unable to get out of bed as she was so weak. Pt states that she only urinated once in the last 24 hours. Pt attempted to eat this am and had diarrhea. Pt does continue to smoke, however has not smoked in past 3 days. Pt was found to be COVID 19 + and in ARF on admit with a BUN/ CR of 95.6/17.52 respectively and Dr. Cadet has been consulted. Pt also found to be anemic with Hgb of 7.3. She will receive one unit of blood. Patient was vaccinated for COVID.  Pt being admit for SOB s/t COVID19 diagnosis, ARF and FVO.     7-3-22  Pt with some increased SOB during HD  Did urinate x 1 today  Labs essentially unchanged  Remains with productive cough  On 5 liters O2 sating 90%  Issues with her RLS today      7/6 : Today's info : seen and examined, no acute events overnight. Continues to improve   Sob improving  Tolerating hd  h and h low and stable    ROS:  Generalized - weakness, poor intake, fatigue,   SOB at rest  Denies abd pain  Urinated x 1 this am    Vital Signs:  /64   Pulse 70   Temp 97.6 °F (36.4 °C) (Oral)   Resp 19   Ht 5' 2" (1.575 m)   Wt 57.4 kg (126 lb 8.7 oz)   SpO2 (!) 94%   Breastfeeding No   BMI 23.15 kg/m²   Body mass index is 23.15 kg/m².    Physical Exam:  Generalized - ill appearing female    HEENT - NC AT [facility]  CVA - S1 S2, IRR  Resp - bilateral diminished, rhonchi, with mild exp wheeze, use of accessory muscles  Abd- soft, NT ND BS +  EXT - trace BLE edema  Neuro - II - XII Grossly intact  HD Cath to [facility]    Labs:  Recent Results (from the past 48 hour(s))   Renal Function Panel    " Collection Time: 07/05/22  3:31 AM   Result Value Ref Range    Sodium Level 134 (L) 136 - 145 mmol/L    Potassium Level 4.3 3.5 - 5.1 mmol/L    Chloride 101 98 - 107 mmol/L    Carbon Dioxide 21 (L) 23 - 31 mmol/L    Glucose Level 94 82 - 115 mg/dL    Blood Urea Nitrogen 44.1 (H) 9.8 - 20.1 mg/dL    Creatinine 7.17 (H) 0.55 - 1.02 mg/dL    Calcium Level Total 8.3 (L) 8.4 - 10.2 mg/dL    Albumin Level 2.4 (L) 3.4 - 4.8 gm/dL    Phosphorus Level 6.4 (H) 2.3 - 4.7 mg/dL    Estimated GFR-Non  6 mls/min/1.73/m2   BNP    Collection Time: 07/05/22  3:31 AM   Result Value Ref Range    Natriuretic Peptide 243.5 (H) <=100.0 pg/mL   CBC with Differential    Collection Time: 07/05/22  3:31 AM   Result Value Ref Range    WBC 6.8 4.5 - 11.5 x10(3)/mcL    RBC 2.97 (L) 4.20 - 5.40 x10(6)/mcL    Hgb 8.1 (L) 12.0 - 16.0 gm/dL    Hct 27.3 (L) 37.0 - 47.0 %    MCV 91.9 80.0 - 94.0 fL    MCH 27.3 27.0 - 31.0 pg    MCHC 29.7 (L) 33.0 - 36.0 mg/dL    RDW 16.4 11.5 - 17.0 %    Platelet 222 130 - 400 x10(3)/mcL    MPV 9.7 7.4 - 10.4 fL    Neut % 87.4 %    Lymph % 7.8 %    Mono % 3.4 %    Eos % 0.0 %    Basophil % 0.1 %    Lymph # 0.53 (L) 0.6 - 4.6 x10(3)/mcL    Neut # 5.9 2.1 - 9.2 x10(3)/mcL    Mono # 0.23 0.1 - 1.3 x10(3)/mcL    Eos # 0.00 0 - 0.9 x10(3)/mcL    Baso # 0.01 0 - 0.2 x10(3)/mcL    IG# 0.09 (H) 0 - 0.04 x10(3)/mcL    IG% 1.3 %    NRBC% 0.0 %   Lactate Dehydrogenase    Collection Time: 07/05/22  9:32 AM   Result Value Ref Range    Lactate Dehydrogenase 349 (H) 125 - 220 U/L   Renal Function Panel    Collection Time: 07/06/22  5:21 AM   Result Value Ref Range    Sodium Level 136 136 - 145 mmol/L    Potassium Level 4.2 3.5 - 5.1 mmol/L    Chloride 104 98 - 107 mmol/L    Carbon Dioxide 24 23 - 31 mmol/L    Glucose Level 103 82 - 115 mg/dL    Blood Urea Nitrogen 32.8 (H) 9.8 - 20.1 mg/dL    Creatinine 5.27 (H) 0.55 - 1.02 mg/dL    Calcium Level Total 8.7 8.4 - 10.2 mg/dL    Albumin Level 2.5 (L) 3.4 - 4.8 gm/dL     Phosphorus Level 4.0 2.3 - 4.7 mg/dL    Estimated GFR-Non  9 mls/min/1.73/m2   CBC with Differential    Collection Time: 07/06/22  5:21 AM   Result Value Ref Range    WBC 5.1 4.5 - 11.5 x10(3)/mcL    RBC 3.23 (L) 4.20 - 5.40 x10(6)/mcL    Hgb 8.7 (L) 12.0 - 16.0 gm/dL    Hct 28.1 (L) 37.0 - 47.0 %    MCV 87.0 80.0 - 94.0 fL    MCH 26.9 (L) 27.0 - 31.0 pg    MCHC 31.0 (L) 33.0 - 36.0 mg/dL    RDW 15.9 11.5 - 17.0 %    Platelet 193 130 - 400 x10(3)/mcL    MPV 9.2 7.4 - 10.4 fL    Neut % 86.2 %    Lymph % 8.2 %    Mono % 3.1 %    Eos % 0.0 %    Basophil % 0.0 %    Lymph # 0.42 (L) 0.6 - 4.6 x10(3)/mcL    Neut # 4.4 2.1 - 9.2 x10(3)/mcL    Mono # 0.16 0.1 - 1.3 x10(3)/mcL    Eos # 0.00 0 - 0.9 x10(3)/mcL    Baso # 0.00 0 - 0.2 x10(3)/mcL    IG# 0.13 (H) 0 - 0.04 x10(3)/mcL    IG% 2.5 %    NRBC% 0.0 %         Assessment/Plan:  1. SOB              Titrate oxygen to maintain sat >90%   Duo Nebs   Brio  2. COVID 19 +              Supportive therapy - unable to receive Remdesivir s/t to renal fx    On decadron  3. SYLVESTER              Dr. Rausch - renal consulted -HD today  4. Anemia              Transfusing 1 unit blood              Check stool focb              Iron studies  5. COPD - Smoker  6. FVO  7. A fib              Heparin - 5000 sq q8    Continue aggressive supportive care  Renal and pulm reccs  actemra if worse respi status    dispo :ltac soon  Cm working         Artur Ross MD

## 2022-07-07 LAB
ALBUMIN SERPL-MCNC: 2.5 GM/DL (ref 3.4–4.8)
BNP BLD-MCNC: 313.2 PG/ML
BUN SERPL-MCNC: 52.7 MG/DL (ref 9.8–20.1)
CALCIUM SERPL-MCNC: 9 MG/DL (ref 8.4–10.2)
CHLORIDE SERPL-SCNC: 105 MMOL/L (ref 98–107)
CO2 SERPL-SCNC: 22 MMOL/L (ref 23–31)
CREAT SERPL-MCNC: 6.7 MG/DL (ref 0.55–1.02)
GLUCOSE SERPL-MCNC: 89 MG/DL (ref 82–115)
LDH SERPL-CCNC: 377 U/L (ref 125–220)
PHOSPHATE SERPL-MCNC: 4.5 MG/DL (ref 2.3–4.7)
POTASSIUM SERPL-SCNC: 3.8 MMOL/L (ref 3.5–5.1)
SODIUM SERPL-SCNC: 137 MMOL/L (ref 136–145)

## 2022-07-07 PROCEDURE — 25000003 PHARM REV CODE 250: Performed by: NURSE PRACTITIONER

## 2022-07-07 PROCEDURE — 87070 CULTURE OTHR SPECIMN AEROBIC: CPT | Performed by: NURSE PRACTITIONER

## 2022-07-07 PROCEDURE — 80069 RENAL FUNCTION PANEL: CPT | Performed by: INTERNAL MEDICINE

## 2022-07-07 PROCEDURE — 63600175 PHARM REV CODE 636 W HCPCS: Performed by: INTERNAL MEDICINE

## 2022-07-07 PROCEDURE — 27000221 HC OXYGEN, UP TO 24 HOURS

## 2022-07-07 PROCEDURE — 36415 COLL VENOUS BLD VENIPUNCTURE: CPT | Performed by: NURSE PRACTITIONER

## 2022-07-07 PROCEDURE — 83880 ASSAY OF NATRIURETIC PEPTIDE: CPT | Performed by: INTERNAL MEDICINE

## 2022-07-07 PROCEDURE — 25000003 PHARM REV CODE 250: Performed by: INTERNAL MEDICINE

## 2022-07-07 PROCEDURE — 80100014 HC HEMODIALYSIS 1:1

## 2022-07-07 PROCEDURE — 63600175 PHARM REV CODE 636 W HCPCS

## 2022-07-07 PROCEDURE — 36415 COLL VENOUS BLD VENIPUNCTURE: CPT | Performed by: INTERNAL MEDICINE

## 2022-07-07 PROCEDURE — 94761 N-INVAS EAR/PLS OXIMETRY MLT: CPT

## 2022-07-07 PROCEDURE — 11000001 HC ACUTE MED/SURG PRIVATE ROOM

## 2022-07-07 PROCEDURE — 83615 LACTATE (LD) (LDH) ENZYME: CPT | Performed by: NURSE PRACTITIONER

## 2022-07-07 PROCEDURE — 27000207 HC ISOLATION

## 2022-07-07 RX ADMIN — OXYCODONE HYDROCHLORIDE AND ACETAMINOPHEN 500 MG: 500 TABLET ORAL at 10:07

## 2022-07-07 RX ADMIN — HEPARIN SODIUM 5000 UNITS: 5000 INJECTION, SOLUTION INTRAVENOUS; SUBCUTANEOUS at 04:07

## 2022-07-07 RX ADMIN — ROPINIROLE HYDROCHLORIDE 1 MG: 1 TABLET, FILM COATED ORAL at 10:07

## 2022-07-07 RX ADMIN — DEXAMETHASONE SODIUM PHOSPHATE 6 MG: 4 INJECTION, SOLUTION INTRA-ARTICULAR; INTRALESIONAL; INTRAMUSCULAR; INTRAVENOUS; SOFT TISSUE at 10:07

## 2022-07-07 RX ADMIN — ALBUTEROL SULFATE 2 PUFF: 90 AEROSOL, METERED RESPIRATORY (INHALATION) at 12:07

## 2022-07-07 RX ADMIN — HEPARIN SODIUM 5000 UNITS: 5000 INJECTION, SOLUTION INTRAVENOUS; SUBCUTANEOUS at 10:07

## 2022-07-07 RX ADMIN — LEVOFLOXACIN 500 MG: 500 TABLET, FILM COATED ORAL at 10:07

## 2022-07-07 RX ADMIN — HEPARIN SODIUM 5000 UNITS: 5000 INJECTION, SOLUTION INTRAVENOUS; SUBCUTANEOUS at 05:07

## 2022-07-07 RX ADMIN — ALBUTEROL SULFATE 2 PUFF: 90 AEROSOL, METERED RESPIRATORY (INHALATION) at 05:07

## 2022-07-07 RX ADMIN — ROPINIROLE HYDROCHLORIDE 3 MG: 1 TABLET, FILM COATED ORAL at 08:07

## 2022-07-07 RX ADMIN — MULTIPLE VITAMINS W/ MINERALS TAB 1 TABLET: TAB at 10:07

## 2022-07-07 RX ADMIN — FLUTICASONE FUROATE AND VILANTEROL TRIFENATATE 1 PUFF: 100; 25 POWDER RESPIRATORY (INHALATION) at 10:07

## 2022-07-07 RX ADMIN — MUPIROCIN: 20 OINTMENT TOPICAL at 10:07

## 2022-07-07 RX ADMIN — OXYCODONE HYDROCHLORIDE AND ACETAMINOPHEN 500 MG: 500 TABLET ORAL at 08:07

## 2022-07-07 RX ADMIN — MUPIROCIN: 20 OINTMENT TOPICAL at 08:07

## 2022-07-07 RX ADMIN — ALBUTEROL SULFATE 2 PUFF: 90 AEROSOL, METERED RESPIRATORY (INHALATION) at 06:07

## 2022-07-07 NOTE — NURSING
07/07/22 1500   Post-Hemodialysis Assessment   Blood Volume Processed (Liters) 25.5 L   Dialyzer Clearance Lightly streaked   Duration of Treatment 120 minutes   Total UF (mL) 1012 mL   Patient Response to Treatment tolerated well   Post-Hemodialysis Comments Multiple issues with alarms d/t HD catheter, lines reversed, BFR decreased, pt repositioned, UFG decreased, no change. Dr Rogers notified, OK to d/c treatment. Pt ran for 2hrs, NET removed= 1012ml. Dressing changed

## 2022-07-07 NOTE — PROGRESS NOTES
Chief complaint: None    Interval History:  Pt breathing comfortably on 2 L O2, going for HD today.  She notes continued low Uout, still with some coughing, though good po intake    Review of Systems   Constitutional: Negative.    HENT: Negative.    Respiratory: Positive for cough.    Cardiovascular: Negative.    Gastrointestinal: Negative.    Genitourinary: Positive for decreased urine volume.   Musculoskeletal: Negative.    Neurological: Positive for weakness.   Psychiatric/Behavioral: Negative.       all else Neg     albuterol  2 puff Inhalation Q6H    ascorbic acid (vitamin C)  500 mg Oral BID    dexamethasone  6 mg Intravenous Daily    fluticasone furoate-vilanteroL  1 puff Inhalation Daily    heparin (porcine)  5,000 Units Subcutaneous Q8H    multivitamin  1 tablet Oral Daily    mupirocin   Nasal BID    rOPINIRole  1 mg Oral Daily    rOPINIRole  3 mg Oral QHS       Objective     VITAL SIGNS: 24 HR MIN & MAX LAST    Temp  Min: 97.5 °F (36.4 °C)  Max: 98.7 °F (37.1 °C)  97.7 °F (36.5 °C)    BP  Min: 112/68  Max: 152/74  (!) 152/74     Pulse  Min: 66  Max: 88  69     Resp  Min: 18  Max: 22  18    SpO2  Min: 90 %  Max: 98 %  (!) 91 %      Wt Readings from Last 3 Encounters:   07/07/22 57.6 kg (127 lb)   03/03/21 57.4 kg (126 lb 8.7 oz)     No intake or output data in the 24 hours ending 07/07/22 1300    Physical Exam  Constitutional:       General: She is not in acute distress.  Cardiovascular:      Rate and Rhythm: Normal rate.   Pulmonary:      Effort: Pulmonary effort is normal.      Breath sounds: Normal breath sounds.   Abdominal:      General: Bowel sounds are normal.      Palpations: Abdomen is soft.      Tenderness: There is no abdominal tenderness.   Musculoskeletal:         General: Swelling present.      Cervical back: Normal range of motion.   Neurological:      Mental Status: She is alert and oriented to person, place, and time.          Recent Labs     07/05/22  0331 07/06/22  0521  07/07/22  0409   * 136 137   K 4.3 4.2 3.8   CHLORIDE 101 104 105   CO2 21* 24 22*   BUN 44.1* 32.8* 52.7*   CREATININE 7.17* 5.27* 6.70*   GLUCOSE 94 103 89   CALCIUM 8.3* 8.7 9.0   PHOS 6.4* 4.0 4.5   ALBUMIN 2.4* 2.5* 2.5*      Recent Labs     07/05/22  0331 07/06/22  0521   WBC 6.8 5.1   HGB 8.1* 8.7*   HCT 27.3* 28.1*    193         Assessment & Plan     1   ATN / CKD unspec - s/p HD Tuesday with 1.5 L UF, still with poor Uout, for HD again today.  Labs show continued poor clearance, will ask sx to place tunneled HD cath  2   COPD, covid+ - Decadron, nebs as per pulmonary   3   Anemia -  Hb up 7.9 to 8.7 p 1 U RBC transfusion on 7/2, EPO 20K U given 7/5,  monitor H/H  4   PAD / H/o  Stents  5   RLS  6   HTN, AF - rate controlled, VSS

## 2022-07-07 NOTE — PROGRESS NOTES
"Internal Medicine Progress Note    Subjective/Interval History:  This is a 67 yo female, who presented to the ED with increased sob over past 5 days. PMH COPD, Smoker, PAD, PVD, HTN, Depression, HLD, and RLS. Per family report, patient had angiogram of lower extremity Monday with Dr. Kaplan. Patient was having some SOB when she was at appointment and became worse over past couple of days. Patient was unable to get out of bed as she was so weak. Pt states that she only urinated once in the last 24 hours. Pt attempted to eat this am and had diarrhea. Pt does continue to smoke, however has not smoked in past 3 days. Pt was found to be COVID 19 + and in ARF on admit with a BUN/ CR of 95.6/17.52 respectively and Dr. Cadet has been consulted. Pt also found to be anemic with Hgb of 7.3. She will receive one unit of blood. Patient was vaccinated for COVID.  Pt being admit for SOB s/t COVID19 diagnosis, ARF and FVO.     7-3-22  Pt with some increased SOB during HD  Did urinate x 1 today  Labs essentially unchanged  Remains with productive cough  On 5 liters O2 sating 90%  Issues with her RLS today      7/6 : Today's info : seen and examined, no acute events overnight. Continues to improve   Sob improving  Tolerating hd  h and h low and stable    7/7 Today's info : seen and examined, no acute events overnight. Continues to improve   Possible tunnel cath placement soon    ROS:  Generalized - weakness, poor intake, fatigue,   SOB at rest  Denies abd pain  Urinated x 1 this am    Vital Signs:  BP (!) 141/69   Pulse 77   Temp 97.9 °F (36.6 °C) (Oral)   Resp 18   Ht 5' 2" (1.575 m)   Wt 57.6 kg (127 lb)   SpO2 (!) 90%   Breastfeeding No   BMI 23.23 kg/m²   Body mass index is 23.23 kg/m².    Physical Exam:  Generalized - ill appearing female    HEENT - NC AT [facility]  CVA - S1 S2, IRR  Resp - bilateral diminished, rhonchi, with mild exp wheeze, use of accessory muscles  Abd- soft, NT ND BS +  EXT - trace BLE edema  Neuro - " II - XII Grossly intact  HD Cath to [facility]    Labs:  Recent Results (from the past 48 hour(s))   Lactate Dehydrogenase    Collection Time: 07/05/22  9:32 AM   Result Value Ref Range    Lactate Dehydrogenase 349 (H) 125 - 220 U/L   Renal Function Panel    Collection Time: 07/06/22  5:21 AM   Result Value Ref Range    Sodium Level 136 136 - 145 mmol/L    Potassium Level 4.2 3.5 - 5.1 mmol/L    Chloride 104 98 - 107 mmol/L    Carbon Dioxide 24 23 - 31 mmol/L    Glucose Level 103 82 - 115 mg/dL    Blood Urea Nitrogen 32.8 (H) 9.8 - 20.1 mg/dL    Creatinine 5.27 (H) 0.55 - 1.02 mg/dL    Calcium Level Total 8.7 8.4 - 10.2 mg/dL    Albumin Level 2.5 (L) 3.4 - 4.8 gm/dL    Phosphorus Level 4.0 2.3 - 4.7 mg/dL    Estimated GFR-Non  9 mls/min/1.73/m2   CBC with Differential    Collection Time: 07/06/22  5:21 AM   Result Value Ref Range    WBC 5.1 4.5 - 11.5 x10(3)/mcL    RBC 3.23 (L) 4.20 - 5.40 x10(6)/mcL    Hgb 8.7 (L) 12.0 - 16.0 gm/dL    Hct 28.1 (L) 37.0 - 47.0 %    MCV 87.0 80.0 - 94.0 fL    MCH 26.9 (L) 27.0 - 31.0 pg    MCHC 31.0 (L) 33.0 - 36.0 mg/dL    RDW 15.9 11.5 - 17.0 %    Platelet 193 130 - 400 x10(3)/mcL    MPV 9.2 7.4 - 10.4 fL    Neut % 86.2 %    Lymph % 8.2 %    Mono % 3.1 %    Eos % 0.0 %    Basophil % 0.0 %    Lymph # 0.42 (L) 0.6 - 4.6 x10(3)/mcL    Neut # 4.4 2.1 - 9.2 x10(3)/mcL    Mono # 0.16 0.1 - 1.3 x10(3)/mcL    Eos # 0.00 0 - 0.9 x10(3)/mcL    Baso # 0.00 0 - 0.2 x10(3)/mcL    IG# 0.13 (H) 0 - 0.04 x10(3)/mcL    IG% 2.5 %    NRBC% 0.0 %   Renal Function Panel    Collection Time: 07/07/22  4:09 AM   Result Value Ref Range    Sodium Level 137 136 - 145 mmol/L    Potassium Level 3.8 3.5 - 5.1 mmol/L    Chloride 105 98 - 107 mmol/L    Carbon Dioxide 22 (L) 23 - 31 mmol/L    Glucose Level 89 82 - 115 mg/dL    Blood Urea Nitrogen 52.7 (H) 9.8 - 20.1 mg/dL    Creatinine 6.70 (H) 0.55 - 1.02 mg/dL    Calcium Level Total 9.0 8.4 - 10.2 mg/dL    Albumin Level 2.5 (L) 3.4 - 4.8 gm/dL     Phosphorus Level 4.5 2.3 - 4.7 mg/dL    Estimated GFR-Non  7 mls/min/1.73/m2   BNP    Collection Time: 07/07/22  4:09 AM   Result Value Ref Range    Natriuretic Peptide 313.2 (H) <=100.0 pg/mL         Assessment/Plan:  1. SOB              Titrate oxygen to maintain sat >90%   Duo Nebs   Brio  2. COVID 19 +              Supportive therapy - unable to receive Remdesivir s/t to renal fx    On decadron  3. SYLVESTER              Dr. Rausch - renal consulted -HD today  4. Anemia              Transfusing 1 unit blood              Check stool focb              Iron studies  5. COPD - Smoker  6. FVO  7. A fib              Heparin - 5000 sq q8    Continue aggressive supportive care  Renal and pulm reccs  actemra if worse respi status    dispo :ltac soon  Once tunnel cath placed         Artur Ross MD

## 2022-07-08 LAB
ALBUMIN SERPL-MCNC: 2.7 GM/DL (ref 3.4–4.8)
BASOPHILS # BLD AUTO: 0.02 X10(3)/MCL (ref 0–0.2)
BASOPHILS NFR BLD AUTO: 0.2 %
BUN SERPL-MCNC: 45.8 MG/DL (ref 9.8–20.1)
CALCIUM SERPL-MCNC: 8.6 MG/DL (ref 8.4–10.2)
CHLORIDE SERPL-SCNC: 107 MMOL/L (ref 98–107)
CO2 SERPL-SCNC: 20 MMOL/L (ref 23–31)
CREAT SERPL-MCNC: 6.12 MG/DL (ref 0.55–1.02)
EOSINOPHIL # BLD AUTO: 0 X10(3)/MCL (ref 0–0.9)
EOSINOPHIL NFR BLD AUTO: 0 %
ERYTHROCYTE [DISTWIDTH] IN BLOOD BY AUTOMATED COUNT: 16.4 % (ref 11.5–17)
GLUCOSE SERPL-MCNC: 83 MG/DL (ref 82–115)
HCT VFR BLD AUTO: 28.5 % (ref 37–47)
HGB BLD-MCNC: 8.5 GM/DL (ref 12–16)
IMM GRANULOCYTES # BLD AUTO: 0.4 X10(3)/MCL (ref 0–0.04)
IMM GRANULOCYTES NFR BLD AUTO: 4.4 %
LYMPHOCYTES # BLD AUTO: 0.98 X10(3)/MCL (ref 0.6–4.6)
LYMPHOCYTES NFR BLD AUTO: 10.7 %
MCH RBC QN AUTO: 27.2 PG (ref 27–31)
MCHC RBC AUTO-ENTMCNC: 29.8 MG/DL (ref 33–36)
MCV RBC AUTO: 91.1 FL (ref 80–94)
MONOCYTES # BLD AUTO: 0.44 X10(3)/MCL (ref 0.1–1.3)
MONOCYTES NFR BLD AUTO: 4.8 %
NEUTROPHILS # BLD AUTO: 7.3 X10(3)/MCL (ref 2.1–9.2)
NEUTROPHILS NFR BLD AUTO: 79.9 %
NRBC BLD AUTO-RTO: 0.2 %
PHOSPHATE SERPL-MCNC: 4 MG/DL (ref 2.3–4.7)
PLATELET # BLD AUTO: 215 X10(3)/MCL (ref 130–400)
PMV BLD AUTO: 10 FL (ref 7.4–10.4)
POCT GLUCOSE: 104 MG/DL (ref 70–110)
POCT GLUCOSE: 124 MG/DL (ref 70–110)
POTASSIUM SERPL-SCNC: 4.1 MMOL/L (ref 3.5–5.1)
RBC # BLD AUTO: 3.13 X10(6)/MCL (ref 4.2–5.4)
SODIUM SERPL-SCNC: 139 MMOL/L (ref 136–145)
WBC # SPEC AUTO: 9.2 X10(3)/MCL (ref 4.5–11.5)

## 2022-07-08 PROCEDURE — 94761 N-INVAS EAR/PLS OXIMETRY MLT: CPT

## 2022-07-08 PROCEDURE — 11000001 HC ACUTE MED/SURG PRIVATE ROOM

## 2022-07-08 PROCEDURE — 27000207 HC ISOLATION

## 2022-07-08 PROCEDURE — C1769 GUIDE WIRE: HCPCS | Performed by: SURGERY

## 2022-07-08 PROCEDURE — 63600175 PHARM REV CODE 636 W HCPCS

## 2022-07-08 PROCEDURE — 63600175 PHARM REV CODE 636 W HCPCS: Performed by: SURGERY

## 2022-07-08 PROCEDURE — 25000003 PHARM REV CODE 250: Performed by: INTERNAL MEDICINE

## 2022-07-08 PROCEDURE — 25000003 PHARM REV CODE 250: Performed by: SURGERY

## 2022-07-08 PROCEDURE — 36558 INSERT TUNNELED CV CATH: CPT | Performed by: SURGERY

## 2022-07-08 PROCEDURE — 85025 COMPLETE CBC W/AUTO DIFF WBC: CPT | Performed by: INTERNAL MEDICINE

## 2022-07-08 PROCEDURE — 63600175 PHARM REV CODE 636 W HCPCS: Mod: JG | Performed by: INTERNAL MEDICINE

## 2022-07-08 PROCEDURE — 27000221 HC OXYGEN, UP TO 24 HOURS

## 2022-07-08 PROCEDURE — C1750 CATH, HEMODIALYSIS,LONG-TERM: HCPCS | Performed by: SURGERY

## 2022-07-08 PROCEDURE — 99152 MOD SED SAME PHYS/QHP 5/>YRS: CPT | Performed by: SURGERY

## 2022-07-08 PROCEDURE — 36415 COLL VENOUS BLD VENIPUNCTURE: CPT | Performed by: INTERNAL MEDICINE

## 2022-07-08 PROCEDURE — 80069 RENAL FUNCTION PANEL: CPT | Performed by: INTERNAL MEDICINE

## 2022-07-08 PROCEDURE — 25000003 PHARM REV CODE 250: Performed by: NURSE PRACTITIONER

## 2022-07-08 PROCEDURE — 63600175 PHARM REV CODE 636 W HCPCS: Performed by: INTERNAL MEDICINE

## 2022-07-08 DEVICE — 15.5F X 28CM CHRONIC HEMODIALYSIS CATHETER SET (WITH CUFF 23CM FROM TIP)
Type: IMPLANTABLE DEVICE | Site: NECK | Status: FUNCTIONAL
Brand: DURAMAX

## 2022-07-08 RX ORDER — POLYETHYLENE GLYCOL 3350 17 G/17G
17 POWDER, FOR SOLUTION ORAL 2 TIMES DAILY
Status: DISCONTINUED | OUTPATIENT
Start: 2022-07-08 | End: 2022-07-14 | Stop reason: HOSPADM

## 2022-07-08 RX ORDER — MIDAZOLAM HYDROCHLORIDE 1 MG/ML
INJECTION, SOLUTION INTRAMUSCULAR; INTRAVENOUS
Status: DISCONTINUED | OUTPATIENT
Start: 2022-07-08 | End: 2022-07-14 | Stop reason: HOSPADM

## 2022-07-08 RX ORDER — FENTANYL CITRATE 50 UG/ML
INJECTION, SOLUTION INTRAMUSCULAR; INTRAVENOUS
Status: DISCONTINUED | OUTPATIENT
Start: 2022-07-08 | End: 2022-07-14 | Stop reason: HOSPADM

## 2022-07-08 RX ORDER — LIDOCAINE HYDROCHLORIDE 20 MG/ML
INJECTION, SOLUTION INFILTRATION; PERINEURAL
Status: DISCONTINUED | OUTPATIENT
Start: 2022-07-08 | End: 2022-07-14 | Stop reason: HOSPADM

## 2022-07-08 RX ORDER — ROPINIROLE 1 MG/1
3 TABLET, FILM COATED ORAL DAILY
Status: DISCONTINUED | OUTPATIENT
Start: 2022-07-08 | End: 2022-07-14 | Stop reason: HOSPADM

## 2022-07-08 RX ORDER — HEPARIN SODIUM 1000 [USP'U]/ML
INJECTION, SOLUTION INTRAVENOUS; SUBCUTANEOUS
Status: DISCONTINUED | OUTPATIENT
Start: 2022-07-08 | End: 2022-07-14 | Stop reason: HOSPADM

## 2022-07-08 RX ADMIN — ALBUTEROL SULFATE 2 PUFF: 90 AEROSOL, METERED RESPIRATORY (INHALATION) at 06:07

## 2022-07-08 RX ADMIN — EPOETIN ALFA-EPBX 20000 UNITS: 40000 INJECTION, SOLUTION INTRAVENOUS; SUBCUTANEOUS at 02:07

## 2022-07-08 RX ADMIN — POLYETHYLENE GLYCOL 3350 17 G: 17 POWDER, FOR SOLUTION ORAL at 08:07

## 2022-07-08 RX ADMIN — HEPARIN SODIUM 5000 UNITS: 5000 INJECTION, SOLUTION INTRAVENOUS; SUBCUTANEOUS at 09:07

## 2022-07-08 RX ADMIN — ALBUTEROL SULFATE 2 PUFF: 90 AEROSOL, METERED RESPIRATORY (INHALATION) at 11:07

## 2022-07-08 RX ADMIN — MUPIROCIN: 20 OINTMENT TOPICAL at 09:07

## 2022-07-08 RX ADMIN — DEXAMETHASONE SODIUM PHOSPHATE 6 MG: 4 INJECTION, SOLUTION INTRA-ARTICULAR; INTRALESIONAL; INTRAMUSCULAR; INTRAVENOUS; SOFT TISSUE at 09:07

## 2022-07-08 RX ADMIN — ROPINIROLE HYDROCHLORIDE 1 MG: 1 TABLET, FILM COATED ORAL at 09:07

## 2022-07-08 RX ADMIN — ROPINIROLE HYDROCHLORIDE 3 MG: 1 TABLET, FILM COATED ORAL at 06:07

## 2022-07-08 RX ADMIN — OXYCODONE HYDROCHLORIDE AND ACETAMINOPHEN 500 MG: 500 TABLET ORAL at 09:07

## 2022-07-08 RX ADMIN — OXYCODONE HYDROCHLORIDE AND ACETAMINOPHEN 500 MG: 500 TABLET ORAL at 08:07

## 2022-07-08 RX ADMIN — MULTIPLE VITAMINS W/ MINERALS TAB 1 TABLET: TAB at 09:07

## 2022-07-08 RX ADMIN — FLUTICASONE FUROATE AND VILANTEROL TRIFENATATE 1 PUFF: 100; 25 POWDER RESPIRATORY (INHALATION) at 09:07

## 2022-07-08 RX ADMIN — HEPARIN SODIUM 5000 UNITS: 5000 INJECTION, SOLUTION INTRAVENOUS; SUBCUTANEOUS at 05:07

## 2022-07-08 NOTE — CONSULTS
[facility] - 5th Floor Med Surg  Vascular Surgery  Consult Note    Consults  Subjective:       History of Present Illness: 67 y/o female with h/o HTN/HLP/COPD/CKD presented to [facility] with c/o SOB.  She had an angiogram with Dr. Kaplan prior and since has had decreased UOP.  She was found to have worsened CKD and initiated on HD.  She was also COVID+.  Since admission, she has continued to improve.    Medications Prior to Admission   Medication Sig Dispense Refill Last Dose    albuterol (PROVENTIL/VENTOLIN HFA) 90 mcg/actuation inhaler SMARTSI Puff(s) By Mouth Every 4 Hours PRN       buPROPion (WELLBUTRIN SR) 150 MG TBSR 12 hr tablet Take 150 mg by mouth 2 (two) times daily.       co-enzyme Q-10 30 mg capsule Take 100 mg by mouth once daily.       DECARA 1,250 mcg (50,000 unit) capsule Take 50,000 Units by mouth every 7 days.       diazePAM (VALIUM) 5 MG tablet SMARTSI-2 Tablet(s) By Mouth PRN       ELIQUIS 5 mg Tab Take 5 mg by mouth 2 (two) times daily.       EUTHYROX 75 mcg tablet Take 75 mcg by mouth once daily.       fluticasone-salmeterol diskus inhaler 250-50 mcg Inhale 1 puff into the lungs.       furosemide (LASIX) 20 MG tablet Take by mouth once daily. As needed for edema       lisinopriL (PRINIVIL,ZESTRIL) 20 MG tablet Take 20 mg by mouth once daily.       rOPINIRole (REQUIP) 1 MG tablet Take 1 mg by mouth once daily.       rOPINIRole (REQUIP) 3 MG tablet Take 3 mg by mouth once daily.       rosuvastatin (CRESTOR) 40 MG Tab Take 40 mg by mouth nightly.       SPIRIVA WITH HANDIHALER 18 mcg inhalation capsule 1 capsule once daily.       SYMBICORT 160-4.5 mcg/actuation HFAA SMARTSI Puff(s) By Mouth Twice Daily       amLODIPine (NORVASC) 5 MG tablet Take 5 mg by mouth once daily.       aspirin (ECOTRIN) 81 MG EC tablet Take 81 mg by mouth.       clopidogreL (PLAVIX) 75 mg tablet Take 75 mg by mouth once daily.       prednisoLONE acetate (PRED FORTE) 1 % DrpS Place  into both eyes.          Review of patient's allergies indicates:   Allergen Reactions    Oxycodone Hives       Past Medical History:   Diagnosis Date    COPD (chronic obstructive pulmonary disease)     Depression     HLD (hyperlipidemia)     Hypertension     PAD (peripheral artery disease)     PVD (peripheral vascular disease)     RLS (restless legs syndrome)      History reviewed. No pertinent surgical history.  Family History    None       Tobacco Use    Smoking status: Current Every Day Smoker     Types: Cigarettes    Smokeless tobacco: Not on file   Substance and Sexual Activity    Alcohol use: Not on file    Drug use: Not on file    Sexual activity: Not on file     Review of Systems   Constitutional: Positive for fatigue. Negative for activity change and appetite change.   HENT: Negative for congestion.    Respiratory: Positive for shortness of breath. Negative for apnea and wheezing.    Cardiovascular: Negative for chest pain.   Gastrointestinal: Negative for abdominal distention, diarrhea, nausea and vomiting.   Endocrine: Negative for cold intolerance.   Genitourinary: Positive for difficulty urinating.   Neurological: Negative for dizziness and speech difficulty.     Objective:     Vital Signs (Most Recent):  Temp: 97.7 °F (36.5 °C) (07/07/22 1129)  Pulse: 89 (07/07/22 1712)  Resp: 20 (07/07/22 1712)  BP: (!) 152/74 (07/07/22 1129)  SpO2: (!) 94 % (07/07/22 1712) Vital Signs (24h Range):  Temp:  [97.7 °F (36.5 °C)-98.7 °F (37.1 °C)] 97.7 °F (36.5 °C)  Pulse:  [69-89] 89  Resp:  [18-20] 20  SpO2:  [90 %-98 %] 94 %  BP: (112-152)/(63-74) 152/74     Weight: 57.6 kg (127 lb)  Body mass index is 23.23 kg/m².    Date 07/07/22 0700 - 07/08/22 0659   Shift 1744-3607 7911-0959 5185-7943 24 Hour Total   INTAKE   P.O. 180 240  420   Shift Total(mL/kg) 180(3.1) 240(4.2)  420(7.3)   OUTPUT   Other  1012  1012   Shift Total(mL/kg)  1012(17.6)  1012(17.6)   Weight (kg) 57.6 57.6 57.6 57.6       Physical  Exam  Constitutional:       Appearance: Normal appearance.   HENT:      Head: Atraumatic.   Eyes:      Extraocular Movements: Extraocular movements intact.   Cardiovascular:      Rate and Rhythm: Normal rate and regular rhythm.      Heart sounds: Normal heart sounds.   Pulmonary:      Effort: Pulmonary effort is normal. No respiratory distress.   Abdominal:      General: Abdomen is flat. Bowel sounds are normal. There is no distension.      Palpations: Abdomen is soft.   Musculoskeletal:         General: Normal range of motion.      Cervical back: Normal range of motion.   Skin:     General: Skin is warm.      Capillary Refill: Capillary refill takes less than 2 seconds.   Neurological:      General: No focal deficit present.      Mental Status: She is alert and oriented to person, place, and time.         Significant Labs:  All pertinent labs from the last 24 hours have been reviewed.    Significant Diagnostics:  I have reviewed all pertinent imaging results/findings within the past 24 hours.    Assessment/Plan:     Active Diagnoses:    Diagnosis Date Noted POA    PRINCIPAL PROBLEM:  Shortness of breath [R06.02] 07/02/2022 Yes    COVID-19 [U07.1] 07/02/2022 Yes    SYLVESTER (acute kidney injury) [N17.9] 07/02/2022 Yes      Problems Resolved During this Admission:     67 y/o female with ESRD  -Pt with worsened CKD requiring HD.  Request has been made by Nephrology to place tunneled HD cath prior to d/c.  Procedure along with risks discussed.    Thank you for your consult.     Benoit Soliman III, MD  Vascular Surgery  [facility] - 5th Floor Med Surg

## 2022-07-08 NOTE — PROGRESS NOTES
Chief complaint: none    Interval History:  Pt NPO for tunneled HD cath, hungry but o/w no new c/o    Review of Systems   Constitutional: Positive for fatigue.   HENT: Negative.    Respiratory: Positive for cough.    Cardiovascular: Negative.    Gastrointestinal: Positive for constipation.   Genitourinary: Positive for decreased urine volume.   Musculoskeletal: Negative.    Neurological:        Restless legs   Psychiatric/Behavioral: Negative.       all else Neg     albuterol  2 puff Inhalation Q6H    ascorbic acid (vitamin C)  500 mg Oral BID    dexamethasone  6 mg Intravenous Daily    fluticasone furoate-vilanteroL  1 puff Inhalation Daily    heparin (porcine)  5,000 Units Subcutaneous Q8H    multivitamin  1 tablet Oral Daily    mupirocin   Nasal BID    rOPINIRole  1 mg Oral Daily    rOPINIRole  3 mg Oral QHS       Objective     VITAL SIGNS: 24 HR MIN & MAX LAST    Temp  Min: 97.8 °F (36.6 °C)  Max: 98.4 °F (36.9 °C)  98.2 °F (36.8 °C)    BP  Min: 118/66  Max: 143/78  131/66     Pulse  Min: 68  Max: 89  73     Resp  Min: 18  Max: 20  20    SpO2  Min: 91 %  Max: 98 %  (!) 94 %      Wt Readings from Last 3 Encounters:   07/07/22 57.6 kg (127 lb)   03/03/21 57.4 kg (126 lb 8.7 oz)       Intake/Output Summary (Last 24 hours) at 7/8/2022 1207  Last data filed at 7/7/2022 1700  Gross per 24 hour   Intake 420 ml   Output 1012 ml   Net -592 ml       Physical Exam  Constitutional:       General: She is not in acute distress.  Cardiovascular:      Rate and Rhythm: Normal rate.   Pulmonary:      Effort: Pulmonary effort is normal.      Breath sounds: Normal breath sounds.   Abdominal:      General: Bowel sounds are normal.      Palpations: Abdomen is soft.      Tenderness: There is no abdominal tenderness.   Musculoskeletal:         General: No swelling.      Cervical back: Normal range of motion.   Neurological:      Mental Status: She is oriented to person, place, and time.          Recent Labs      07/06/22  0521 07/07/22  0409 07/08/22  0409    137 139   K 4.2 3.8 4.1   CHLORIDE 104 105 107   CO2 24 22* 20*   BUN 32.8* 52.7* 45.8*   CREATININE 5.27* 6.70* 6.12*   GLUCOSE 103 89 83   CALCIUM 8.7 9.0 8.6   PHOS 4.0 4.5 4.0   ALBUMIN 2.5* 2.5* 2.7*      Recent Labs     07/06/22  0521 07/08/22  0409   WBC 5.1 9.2   HGB 8.7* 8.5*   HCT 28.1* 28.5*    215         Assessment & Plan   1   ATN / CKD unspec - s/p HD yesterday with only 1 L UF, treatment terminated early due to poorly functioning temp HD cath.  She is currently npo for tunneled HD cath placement today, then plan HD tomorrow with 2 L UF as tolerated.  She remains with poor Uout, uncertain prognosis for renal recovery   2   COPD, covid+ - Decadron, nebs as per pulmonary   3   Anemia -  Hb stable at 8.5 since 1 U RBC transfusion on 7/2, EPO 20K U given 7/5, will redose 20K U today,  monitor H/H  4   PAD / H/o  Stents  5   RLS - adjust pm dose of ropinirole to 19:00 as per pt request  6   HTN, AF - rate controlled, VSS  7. Constipation - miralax bid

## 2022-07-08 NOTE — PLAN OF CARE
Updates sent to [facility] at [facility] via [facility] if line is placed they can accept patient over weekend

## 2022-07-08 NOTE — PROGRESS NOTES
"Internal Medicine Progress Note    Subjective/Interval History:  This is a 65 yo female, who presented to the ED with increased sob over past 5 days. PMH COPD, Smoker, PAD, PVD, HTN, Depression, HLD, and RLS. Per family report, patient had angiogram of lower extremity Monday with Dr. Kaplan. Patient was having some SOB when she was at appointment and became worse over past couple of days. Patient was unable to get out of bed as she was so weak. Pt states that she only urinated once in the last 24 hours. Pt attempted to eat this am and had diarrhea. Pt does continue to smoke, however has not smoked in past 3 days. Pt was found to be COVID 19 + and in ARF on admit with a BUN/ CR of 95.6/17.52 respectively and Dr. Cadet has been consulted. Pt also found to be anemic with Hgb of 7.3. She will receive one unit of blood. Patient was vaccinated for COVID.  Pt being admit for SOB s/t COVID19 diagnosis, ARF and FVO.     7-3-22  Pt with some increased SOB during HD  Did urinate x 1 today  Labs essentially unchanged  Remains with productive cough  On 5 liters O2 sating 90%  Issues with her RLS today      7/6 : Today's info : seen and examined, no acute events overnight. Continues to improve   Sob improving  Tolerating hd  h and h low and stable    7/8 Today's info : seen and examined, no acute events overnight. Continues to improve   Plans for tunnel cath placement today    ROS:  Generalized - weakness, poor intake, fatigue,   SOB at rest  Denies abd pain  Urinated x 1 this am    Vital Signs:  /66   Pulse 73   Temp 98.2 °F (36.8 °C) (Oral)   Resp 20   Ht 5' 2" (1.575 m)   Wt 57.6 kg (127 lb)   SpO2 (!) 94%   Breastfeeding No   BMI 23.23 kg/m²   Body mass index is 23.23 kg/m².    Physical Exam:  Generalized - ill appearing female    HEENT - NC AT [facility]  CVA - S1 S2, IRR  Resp - bilateral diminished, rhonchi, with mild exp wheeze, use of accessory muscles  Abd- soft, NT ND BS +  EXT - trace BLE edema  Neuro - II " - XII Grossly intact  HD Cath to [facility]    Labs:  Recent Results (from the past 48 hour(s))   Respiratory Culture    Collection Time: 07/07/22 12:40 AM    Specimen: Sputum, Expectorated   Result Value Ref Range    Respiratory Culture Few Yeast (A)     GRAM STAIN Quality 3+ (A)     GRAM STAIN Moderate Gram Negative Rods (A)     GRAM STAIN Moderate Gram positive cocci (A)     GRAM STAIN Few Gram Positive Rods (A)     GRAM STAIN Rare Yeast (A)    Renal Function Panel    Collection Time: 07/07/22  4:09 AM   Result Value Ref Range    Sodium Level 137 136 - 145 mmol/L    Potassium Level 3.8 3.5 - 5.1 mmol/L    Chloride 105 98 - 107 mmol/L    Carbon Dioxide 22 (L) 23 - 31 mmol/L    Glucose Level 89 82 - 115 mg/dL    Blood Urea Nitrogen 52.7 (H) 9.8 - 20.1 mg/dL    Creatinine 6.70 (H) 0.55 - 1.02 mg/dL    Calcium Level Total 9.0 8.4 - 10.2 mg/dL    Albumin Level 2.5 (L) 3.4 - 4.8 gm/dL    Phosphorus Level 4.5 2.3 - 4.7 mg/dL    Estimated GFR-Non  7 mls/min/1.73/m2   BNP    Collection Time: 07/07/22  4:09 AM   Result Value Ref Range    Natriuretic Peptide 313.2 (H) <=100.0 pg/mL   Lactate Dehydrogenase    Collection Time: 07/07/22  9:04 AM   Result Value Ref Range    Lactate Dehydrogenase 377 (H) 125 - 220 U/L   Renal Function Panel    Collection Time: 07/08/22  4:09 AM   Result Value Ref Range    Sodium Level 139 136 - 145 mmol/L    Potassium Level 4.1 3.5 - 5.1 mmol/L    Chloride 107 98 - 107 mmol/L    Carbon Dioxide 20 (L) 23 - 31 mmol/L    Glucose Level 83 82 - 115 mg/dL    Blood Urea Nitrogen 45.8 (H) 9.8 - 20.1 mg/dL    Creatinine 6.12 (H) 0.55 - 1.02 mg/dL    Calcium Level Total 8.6 8.4 - 10.2 mg/dL    Albumin Level 2.7 (L) 3.4 - 4.8 gm/dL    Phosphorus Level 4.0 2.3 - 4.7 mg/dL    Estimated GFR-Non  7 mls/min/1.73/m2   CBC with Differential    Collection Time: 07/08/22  4:09 AM   Result Value Ref Range    WBC 9.2 4.5 - 11.5 x10(3)/mcL    RBC 3.13 (L) 4.20 - 5.40 x10(6)/mcL    Hgb 8.5 (L)  12.0 - 16.0 gm/dL    Hct 28.5 (L) 37.0 - 47.0 %    MCV 91.1 80.0 - 94.0 fL    MCH 27.2 27.0 - 31.0 pg    MCHC 29.8 (L) 33.0 - 36.0 mg/dL    RDW 16.4 11.5 - 17.0 %    Platelet 215 130 - 400 x10(3)/mcL    MPV 10.0 7.4 - 10.4 fL    Neut % 79.9 %    Lymph % 10.7 %    Mono % 4.8 %    Eos % 0.0 %    Basophil % 0.2 %    Lymph # 0.98 0.6 - 4.6 x10(3)/mcL    Neut # 7.3 2.1 - 9.2 x10(3)/mcL    Mono # 0.44 0.1 - 1.3 x10(3)/mcL    Eos # 0.00 0 - 0.9 x10(3)/mcL    Baso # 0.02 0 - 0.2 x10(3)/mcL    IG# 0.40 (H) 0 - 0.04 x10(3)/mcL    IG% 4.4 %    NRBC% 0.2 %   POCT glucose    Collection Time: 07/08/22 11:07 AM   Result Value Ref Range    POCT Glucose 104 70 - 110 mg/dL         Assessment/Plan:  1. SOB              Titrate oxygen to maintain sat >90%   Duo Nebs   Brio  2. COVID 19 +              Supportive therapy - unable to receive Remdesivir s/t to renal fx    On decadron  3. SLYVESTER              Dr. Rausch - renal consulted -HD today  4. Anemia              Transfusing 1 unit blood              Check stool focb              Iron studies  5. COPD - Smoker  6. FVO  7. A fib              Heparin - 5000 sq q8    Continue aggressive supportive care  Renal and pulm reccs  actemra if worse respi status    dispo :ltac soon  Once tunnel cath placed         Artur Ross MD

## 2022-07-08 NOTE — PROGRESS NOTES
Nutrition   Progress Note      Recommendations:  Once medically feasible, resume renal, dialysis diet.       Reason for Evaluation:  Identified at risk by screening criteria, RD follow-up    Diagnosis:    1. Acute renal failure    2. Shortness of breath    3. Dialysis complication    4. SYLVESTER (acute kidney injury)    5. COVID-19    6. Symptomatic anemia    7. End stage renal disease        Relevant Medical History:    Past Medical History:   Diagnosis Date    COPD (chronic obstructive pulmonary disease)     Depression     HLD (hyperlipidemia)     Hypertension     PAD (peripheral artery disease)     PVD (peripheral vascular disease)     RLS (restless legs syndrome)          Nutrition Diet History:    Factors affecting nutritional intake: NPO for tunneled HD cath    Food / Church / Culture Preferences:  none      Nutrition Prescription Ordered:    Current Diet Order: NPO    Appetite:  NPO    PO intake: NPO      Labs / Medications / Procedures:    Nutrition Related Medications: ascorbic acid, dexamethasone, MVI    Nutrition Related Labs:    7/3: Na 135, Bun 92.4, Crea 16.37, GFR 2, Gluc 166  7/8: CO2 20L, BUN 45.8H, Crea 6.12H, GFR 7L, Alb 2.7L    Anthropometrics:  Height:  158cm  Admit Weight:   57.4kg  Latest Weight:   57.4kg    Wt Readings from Last 5 Encounters:   07/07/22 57.6 kg (127 lb)   03/03/21 57.4 kg (126 lb 8.7 oz)     IBW: 50kg  %IBW: 114%  UBW: unknown at this time  %Weight Change: unknown at this time  BMI: 23  BMI classification:  Normal (BMI 18.5 - 24.9)      Nutrition Narrative:  7/3: Pt receiving dialysis at bedside during rounds in ED. Hx obtained from pt RN. RN to contact MD to order dialysis diet, currently pt is without a diet order. Pt with oxymask. NSG malnutrition screen not yet complete. Once pt diet advanced, consider adding oral nutrition supplement if PO intakes <75% of meals served.     7/8: NPO today for HD tunneled HD cath. Appetite improved last few days. No GI complaints.      Monitoring and Evaluation:    Nutrition Monitoring and Evaluation:  food and beverage intake    Nutrition Risk:  Level of Nutrition Risk:  Low  Frequency of Follow up:  Dietitian will f/up within 7 days.

## 2022-07-08 NOTE — OP NOTE
Date of Service:7/8/2022    Preop diagnosis:  End-stage renal disease    Postop diagnosis:  Same    Procedure performed:  1. Ultrasound-guided access of right internal jugular vein 2. Internal jugular tunneled dialysis catheter placement 3. Fluoroscopy number for approximately 15 minutes of monitored moderate    Surgeon:  Benoit Soliman    Anesthesia:  Local 1% lidocaine conscious sedation    Estimated blood loss:  5 cc    Complications:  None    Indications for procedure:  The patient is a 66-year-old female with worsening chronic kidney disease requiring hemodialysis.  Request has been made for tunneled dialysis catheter placement    Procedure in detail:  After informed consent was obtained patient taken operative room and placed in supine position.  She was prepped and draped sterile fashion.  We used ultrasound to identify the internal jugular vein on the patient's right-hand side and these images were saved.  We accessed this with an 18 gauge needle followed by wire via Seldinger technique.  This was done under real-time ultrasonography of a widely patent vessel.  The wire was placed in the inferior vena cava.  The tract was serially dilated.  We placed the large sheath under fluoroscopy.  The catheter was tunneled from a separate stab incision on the right chest wall with the cuff just proximal to the skin edge.  The catheter was placed in the sheath the sheath was removed in standard peel-away fashion with the tip at the cavoatrial junction.  The catheter flushed and withdrew easily and the catheter was instilled with the appropriate amount of straight heparin.  The catheter was secured the right chest wall with a nylon suture x2 in the insertion site the neck was closed with a 4-0 Monocryl in subcuticular fashion followed by Dermabond and sterile dressings.  The patient tolerated the procedure well without any acute events or complications was transferred in stable condition to recovery room.

## 2022-07-09 LAB
ALBUMIN SERPL-MCNC: 2.9 GM/DL (ref 3.4–4.8)
BACTERIA SPEC CULT: ABNORMAL
BASOPHILS # BLD AUTO: 0.01 X10(3)/MCL (ref 0–0.2)
BASOPHILS NFR BLD AUTO: 0.1 %
BUN SERPL-MCNC: 59 MG/DL (ref 9.8–20.1)
CALCIUM SERPL-MCNC: 8.8 MG/DL (ref 8.4–10.2)
CHLORIDE SERPL-SCNC: 106 MMOL/L (ref 98–107)
CO2 SERPL-SCNC: 18 MMOL/L (ref 23–31)
CREAT SERPL-MCNC: 7.28 MG/DL (ref 0.55–1.02)
EOSINOPHIL # BLD AUTO: 0 X10(3)/MCL (ref 0–0.9)
EOSINOPHIL NFR BLD AUTO: 0 %
ERYTHROCYTE [DISTWIDTH] IN BLOOD BY AUTOMATED COUNT: 16.6 % (ref 11.5–17)
GLUCOSE SERPL-MCNC: 100 MG/DL (ref 82–115)
GRAM STN SPEC: ABNORMAL
HCT VFR BLD AUTO: 28 % (ref 37–47)
HGB BLD-MCNC: 8.9 GM/DL (ref 12–16)
IMM GRANULOCYTES # BLD AUTO: 0.25 X10(3)/MCL (ref 0–0.04)
IMM GRANULOCYTES NFR BLD AUTO: 2.3 %
LYMPHOCYTES # BLD AUTO: 0.84 X10(3)/MCL (ref 0.6–4.6)
LYMPHOCYTES NFR BLD AUTO: 7.7 %
MCH RBC QN AUTO: 27.6 PG (ref 27–31)
MCHC RBC AUTO-ENTMCNC: 31.8 MG/DL (ref 33–36)
MCV RBC AUTO: 87 FL (ref 80–94)
MONOCYTES # BLD AUTO: 0.49 X10(3)/MCL (ref 0.1–1.3)
MONOCYTES NFR BLD AUTO: 4.5 %
NEUTROPHILS # BLD AUTO: 9.3 X10(3)/MCL (ref 2.1–9.2)
NEUTROPHILS NFR BLD AUTO: 85.4 %
NRBC BLD AUTO-RTO: 0.3 %
PHOSPHATE SERPL-MCNC: 4.7 MG/DL (ref 2.3–4.7)
PLATELET # BLD AUTO: 208 X10(3)/MCL (ref 130–400)
PMV BLD AUTO: 9.3 FL (ref 7.4–10.4)
POTASSIUM SERPL-SCNC: 4.2 MMOL/L (ref 3.5–5.1)
RBC # BLD AUTO: 3.22 X10(6)/MCL (ref 4.2–5.4)
SODIUM SERPL-SCNC: 139 MMOL/L (ref 136–145)
WBC # SPEC AUTO: 10.9 X10(3)/MCL (ref 4.5–11.5)

## 2022-07-09 PROCEDURE — 85025 COMPLETE CBC W/AUTO DIFF WBC: CPT | Performed by: INTERNAL MEDICINE

## 2022-07-09 PROCEDURE — 94761 N-INVAS EAR/PLS OXIMETRY MLT: CPT

## 2022-07-09 PROCEDURE — 90935 HEMODIALYSIS ONE EVALUATION: CPT

## 2022-07-09 PROCEDURE — 80069 RENAL FUNCTION PANEL: CPT | Performed by: INTERNAL MEDICINE

## 2022-07-09 PROCEDURE — 63600175 PHARM REV CODE 636 W HCPCS: Performed by: INTERNAL MEDICINE

## 2022-07-09 PROCEDURE — 11000001 HC ACUTE MED/SURG PRIVATE ROOM

## 2022-07-09 PROCEDURE — 63600175 PHARM REV CODE 636 W HCPCS

## 2022-07-09 PROCEDURE — 25000003 PHARM REV CODE 250: Performed by: NURSE PRACTITIONER

## 2022-07-09 PROCEDURE — 27000221 HC OXYGEN, UP TO 24 HOURS

## 2022-07-09 PROCEDURE — 25000003 PHARM REV CODE 250: Performed by: INTERNAL MEDICINE

## 2022-07-09 PROCEDURE — 27000207 HC ISOLATION

## 2022-07-09 PROCEDURE — 36415 COLL VENOUS BLD VENIPUNCTURE: CPT | Performed by: INTERNAL MEDICINE

## 2022-07-09 RX ADMIN — FLUTICASONE FUROATE AND VILANTEROL TRIFENATATE 1 PUFF: 100; 25 POWDER RESPIRATORY (INHALATION) at 02:07

## 2022-07-09 RX ADMIN — POLYETHYLENE GLYCOL 3350 17 G: 17 POWDER, FOR SOLUTION ORAL at 10:07

## 2022-07-09 RX ADMIN — ROPINIROLE HYDROCHLORIDE 1 MG: 1 TABLET, FILM COATED ORAL at 08:07

## 2022-07-09 RX ADMIN — ALBUTEROL SULFATE 2 PUFF: 90 AEROSOL, METERED RESPIRATORY (INHALATION) at 01:07

## 2022-07-09 RX ADMIN — MULTIPLE VITAMINS W/ MINERALS TAB 1 TABLET: TAB at 08:07

## 2022-07-09 RX ADMIN — OXYCODONE HYDROCHLORIDE AND ACETAMINOPHEN 500 MG: 500 TABLET ORAL at 08:07

## 2022-07-09 RX ADMIN — DEXAMETHASONE SODIUM PHOSPHATE 6 MG: 4 INJECTION, SOLUTION INTRA-ARTICULAR; INTRALESIONAL; INTRAMUSCULAR; INTRAVENOUS; SOFT TISSUE at 08:07

## 2022-07-09 RX ADMIN — POLYETHYLENE GLYCOL 3350 17 G: 17 POWDER, FOR SOLUTION ORAL at 08:07

## 2022-07-09 RX ADMIN — FLUTICASONE FUROATE AND VILANTEROL TRIFENATATE 1 PUFF: 100; 25 POWDER RESPIRATORY (INHALATION) at 08:07

## 2022-07-09 RX ADMIN — HEPARIN SODIUM 5000 UNITS: 5000 INJECTION, SOLUTION INTRAVENOUS; SUBCUTANEOUS at 04:07

## 2022-07-09 RX ADMIN — ALBUTEROL SULFATE 2 PUFF: 90 AEROSOL, METERED RESPIRATORY (INHALATION) at 10:07

## 2022-07-09 RX ADMIN — OXYCODONE HYDROCHLORIDE AND ACETAMINOPHEN 500 MG: 500 TABLET ORAL at 10:07

## 2022-07-09 RX ADMIN — ROPINIROLE HYDROCHLORIDE 3 MG: 1 TABLET, FILM COATED ORAL at 10:07

## 2022-07-09 RX ADMIN — HEPARIN SODIUM 5000 UNITS: 5000 INJECTION, SOLUTION INTRAVENOUS; SUBCUTANEOUS at 01:07

## 2022-07-09 RX ADMIN — HEPARIN SODIUM 5000 UNITS: 5000 INJECTION, SOLUTION INTRAVENOUS; SUBCUTANEOUS at 08:07

## 2022-07-09 RX ADMIN — ALBUTEROL SULFATE 2 PUFF: 90 AEROSOL, METERED RESPIRATORY (INHALATION) at 06:07

## 2022-07-09 NOTE — PLAN OF CARE
Problem: Adult Inpatient Plan of Care  Goal: Plan of Care Review  Outcome: Ongoing, Progressing  Flowsheets (Taken 7/9/2022 0752)  Plan of Care Reviewed With: patient  Goal: Patient-Specific Goal (Individualized)  Outcome: Ongoing, Progressing  Flowsheets (Taken 7/9/2022 0752)  Anxieties, Fears or Concerns: none  Individualized Care Needs: maintain O2 sats  Patient-Specific Goals (Include Timeframe): getting off oxygen by the weekend.  Goal: Absence of Hospital-Acquired Illness or Injury  Outcome: Ongoing, Progressing  Intervention: Identify and Manage Fall Risk  Flowsheets (Taken 7/9/2022 0752)  Safety Promotion/Fall Prevention:   assistive device/personal item within reach   medications reviewed   nonskid shoes/socks when out of bed   side rails raised x 2  Intervention: Prevent Skin Injury  Flowsheets (Taken 7/9/2022 0752)  Body Position:   30 degrees   position changed independently  Skin Protection:   adhesive use limited   tubing/devices free from skin contact  Intervention: Prevent and Manage VTE (Venous Thromboembolism) Risk  Flowsheets (Taken 7/9/2022 0752)  Activity Management: Up in chair - L3  VTE Prevention/Management:   remove, assess skin, and reapply sequential compression device   ROM (active) performed  Range of Motion: active ROM (range of motion) encouraged  Intervention: Prevent Infection  Flowsheets (Taken 7/9/2022 0752)  Infection Prevention: hand hygiene promoted  Goal: Optimal Comfort and Wellbeing  Outcome: Ongoing, Progressing  Intervention: Monitor Pain and Promote Comfort  Flowsheets (Taken 7/9/2022 0752)  Pain Management Interventions:   position adjusted   relaxation techniques promoted   quiet environment facilitated  Intervention: Provide Person-Centered Care  Flowsheets (Taken 7/9/2022 0752)  Trust Relationship/Rapport:   care explained   choices provided   emotional support provided   empathic listening provided   thoughts/feelings acknowledged   reassurance provided   questions  encouraged   questions answered  Goal: Readiness for Transition of Care  Outcome: Ongoing, Progressing     Problem: Device-Related Complication Risk (Hemodialysis)  Goal: Safe, Effective Therapy Delivery  Outcome: Ongoing, Progressing     Problem: Hemodynamic Instability (Hemodialysis)  Goal: Effective Tissue Perfusion  Outcome: Ongoing, Progressing     Problem: Infection (Hemodialysis)  Goal: Absence of Infection Signs and Symptoms  Outcome: Ongoing, Progressing     Problem: Fluid and Electrolyte Imbalance (Acute Kidney Injury/Impairment)  Goal: Fluid and Electrolyte Balance  Outcome: Ongoing, Progressing     Problem: Oral Intake Inadequate (Acute Kidney Injury/Impairment)  Goal: Optimal Nutrition Intake  Outcome: Ongoing, Progressing     Problem: Renal Function Impairment (Acute Kidney Injury/Impairment)  Goal: Effective Renal Function  Outcome: Ongoing, Progressing  Intervention: Monitor and Support Renal Function  Flowsheets (Taken 7/9/2022 0752)  Medication Review/Management: medications reviewed     Problem: Infection  Goal: Absence of Infection Signs and Symptoms  Outcome: Ongoing, Progressing  Intervention: Prevent or Manage Infection  Flowsheets (Taken 7/9/2022 0752)  Infection Management: aseptic technique maintained     Problem: Fall Injury Risk  Goal: Absence of Fall and Fall-Related Injury  Outcome: Ongoing, Progressing  Intervention: Identify and Manage Contributors  Flowsheets (Taken 7/9/2022 0752)  Self-Care Promotion:   independence encouraged   BADL personal objects within reach  Medication Review/Management: medications reviewed  Intervention: Promote Injury-Free Environment  Flowsheets (Taken 7/9/2022 0752)  Safety Promotion/Fall Prevention:   assistive device/personal item within reach   medications reviewed   nonskid shoes/socks when out of bed   side rails raised x 2

## 2022-07-09 NOTE — NURSING
07/09/22 1300   Post-Hemodialysis Assessment   Rinseback Volume (mL) 500 mL   Blood Volume Processed (Liters) 72.4 L   Dialyzer Clearance Lightly streaked   Duration of Treatment 180 minutes   Additional Fluid Intake (mL) 0 mL   Total UF (mL) 2000 mL   Net Fluid Removal 1500   Patient Response to Treatment pt clotted off mid treatment. Resetup and finished. Cath was clamping down towards the end.

## 2022-07-09 NOTE — PROGRESS NOTES
Renal       Initial 7/3/22 HPI: 65 Y/O female with past history of COPD , PAD , HTN , Depresion , Hyperlipidemia , RLS, admited to ER with complint of SOB and diagnosed with COVID-19. Pt reports that she had peripheral angio 5 days ago and noted decreased UOP    Chief complaint:   None per nurse    Interval History:  Internal jugular tunneled dialysis catheter placed yesterday. Had HD this morning with 1.5 L UF.    ROS:  all else Neg     albuterol  2 puff Inhalation Q6H    ascorbic acid (vitamin C)  500 mg Oral BID    dexamethasone  6 mg Intravenous Daily    fluticasone furoate-vilanteroL  1 puff Inhalation Daily    heparin (porcine)  5,000 Units Subcutaneous Q8H    multivitamin  1 tablet Oral Daily    polyethylene glycol  17 g Oral BID    rOPINIRole  1 mg Oral Daily    rOPINIRole  3 mg Oral Daily       VITAL SIGNS: 24 HR MIN & MAX LAST    Temp  Min: 97.4 °F (36.3 °C)  Max: 98.2 °F (36.8 °C)  97.7 °F (36.5 °C)        BP  Min: 100/62  Max: 173/72  100/62     Pulse  Min: 67  Max: 85  85     Resp  Min: 17  Max: 22  (!) 22    SpO2  Min: 88 %  Max: 96 %  (!) 88 %      Wt Readings from Last 3 Encounters:   07/07/22 57.6 kg (127 lb)   03/03/21 57.4 kg (126 lb 8.7 oz)       Intake/Output Summary (Last 24 hours) at 7/9/2022 1537  Last data filed at 7/9/2022 1300  Gross per 24 hour   Intake 0 ml   Output 2000 ml   Net -2000 ml     2 L NC  O2 sats 94%  Direct PE deferred d/t COVID isolation    Recent Labs     07/07/22  0409 07/08/22  0409 07/09/22  0712    139 139   K 3.8 4.1 4.2   CHLORIDE 105 107 106   CO2 22* 20* 18*   BUN 52.7* 45.8* 59.0*   CREATININE 6.70* 6.12* 7.28*   GLUCOSE 89 83 100   CALCIUM 9.0 8.6 8.8   PHOS 4.5 4.0 4.7   ALBUMIN 2.5* 2.7* 2.9*      Recent Labs     07/08/22  0409 07/09/22  0712   WBC 9.2 10.9   HGB 8.5* 8.9*   HCT 28.5* 28.0*    208       ASSESSMENT / PLAN    COVID-19    1   ATN / CKD unspec - s/p tunneled HD cath placement 7/8/22. She remains with poor Uout, uncertain  prognosis for renal recovery. Labs daily, HD PRN. Will probably need HD again by  Monday  2   COPD, COVID-19 - Decadron, nebs  3   Anemia - 1 U RBC transfusion on 7/2, EPO 20K U MWF  monitor H/H  4   PAD / H/o  Stents  5   RLS - ropinirole   6   HTN, AF - rate controlled, VSS  7. Constipation - miralax bid

## 2022-07-10 LAB
ALBUMIN SERPL-MCNC: 2.4 GM/DL (ref 3.4–4.8)
BASOPHILS # BLD AUTO: 0 X10(3)/MCL (ref 0–0.2)
BASOPHILS NFR BLD AUTO: 0 %
BUN SERPL-MCNC: 44 MG/DL (ref 9.8–20.1)
CALCIUM SERPL-MCNC: 8.1 MG/DL (ref 8.4–10.2)
CHLORIDE SERPL-SCNC: 107 MMOL/L (ref 98–107)
CO2 SERPL-SCNC: 21 MMOL/L (ref 23–31)
CREAT SERPL-MCNC: 5.26 MG/DL (ref 0.55–1.02)
EOSINOPHIL # BLD AUTO: 0 X10(3)/MCL (ref 0–0.9)
EOSINOPHIL NFR BLD AUTO: 0 %
ERYTHROCYTE [DISTWIDTH] IN BLOOD BY AUTOMATED COUNT: 16.5 % (ref 11.5–17)
GLUCOSE SERPL-MCNC: 90 MG/DL (ref 82–115)
HCT VFR BLD AUTO: 24.9 % (ref 37–47)
HGB BLD-MCNC: 8 GM/DL (ref 12–16)
IMM GRANULOCYTES # BLD AUTO: 0.22 X10(3)/MCL (ref 0–0.04)
IMM GRANULOCYTES NFR BLD AUTO: 2.5 %
LYMPHOCYTES # BLD AUTO: 1.2 X10(3)/MCL (ref 0.6–4.6)
LYMPHOCYTES NFR BLD AUTO: 13.8 %
MAGNESIUM SERPL-MCNC: 1.7 MG/DL (ref 1.6–2.6)
MCH RBC QN AUTO: 27 PG (ref 27–31)
MCHC RBC AUTO-ENTMCNC: 32.1 MG/DL (ref 33–36)
MCV RBC AUTO: 84.1 FL (ref 80–94)
MONOCYTES # BLD AUTO: 0.62 X10(3)/MCL (ref 0.1–1.3)
MONOCYTES NFR BLD AUTO: 7.1 %
NEUTROPHILS # BLD AUTO: 6.7 X10(3)/MCL (ref 2.1–9.2)
NEUTROPHILS NFR BLD AUTO: 76.6 %
NRBC BLD AUTO-RTO: 0.5 %
PHOSPHATE SERPL-MCNC: 4.1 MG/DL (ref 2.3–4.7)
PLATELET # BLD AUTO: 142 X10(3)/MCL (ref 130–400)
PMV BLD AUTO: 9.5 FL (ref 7.4–10.4)
POTASSIUM SERPL-SCNC: 4.1 MMOL/L (ref 3.5–5.1)
RBC # BLD AUTO: 2.96 X10(6)/MCL (ref 4.2–5.4)
SODIUM SERPL-SCNC: 139 MMOL/L (ref 136–145)
WBC # SPEC AUTO: 8.7 X10(3)/MCL (ref 4.5–11.5)

## 2022-07-10 PROCEDURE — 94761 N-INVAS EAR/PLS OXIMETRY MLT: CPT

## 2022-07-10 PROCEDURE — 11000001 HC ACUTE MED/SURG PRIVATE ROOM

## 2022-07-10 PROCEDURE — 63600175 PHARM REV CODE 636 W HCPCS

## 2022-07-10 PROCEDURE — 25000003 PHARM REV CODE 250: Performed by: NURSE PRACTITIONER

## 2022-07-10 PROCEDURE — 27000221 HC OXYGEN, UP TO 24 HOURS

## 2022-07-10 PROCEDURE — 85025 COMPLETE CBC W/AUTO DIFF WBC: CPT | Performed by: INTERNAL MEDICINE

## 2022-07-10 PROCEDURE — 27000207 HC ISOLATION

## 2022-07-10 PROCEDURE — 63600175 PHARM REV CODE 636 W HCPCS: Performed by: INTERNAL MEDICINE

## 2022-07-10 PROCEDURE — 25000003 PHARM REV CODE 250: Performed by: INTERNAL MEDICINE

## 2022-07-10 PROCEDURE — 83735 ASSAY OF MAGNESIUM: CPT | Performed by: INTERNAL MEDICINE

## 2022-07-10 PROCEDURE — 80069 RENAL FUNCTION PANEL: CPT | Performed by: INTERNAL MEDICINE

## 2022-07-10 PROCEDURE — 36415 COLL VENOUS BLD VENIPUNCTURE: CPT | Performed by: INTERNAL MEDICINE

## 2022-07-10 RX ADMIN — FLUTICASONE FUROATE AND VILANTEROL TRIFENATATE 1 PUFF: 100; 25 POWDER RESPIRATORY (INHALATION) at 08:07

## 2022-07-10 RX ADMIN — HEPARIN SODIUM 5000 UNITS: 5000 INJECTION, SOLUTION INTRAVENOUS; SUBCUTANEOUS at 08:07

## 2022-07-10 RX ADMIN — OXYCODONE HYDROCHLORIDE AND ACETAMINOPHEN 500 MG: 500 TABLET ORAL at 08:07

## 2022-07-10 RX ADMIN — DEXAMETHASONE SODIUM PHOSPHATE 6 MG: 4 INJECTION, SOLUTION INTRA-ARTICULAR; INTRALESIONAL; INTRAMUSCULAR; INTRAVENOUS; SOFT TISSUE at 08:07

## 2022-07-10 RX ADMIN — HEPARIN SODIUM 5000 UNITS: 5000 INJECTION, SOLUTION INTRAVENOUS; SUBCUTANEOUS at 02:07

## 2022-07-10 RX ADMIN — POLYETHYLENE GLYCOL 3350 17 G: 17 POWDER, FOR SOLUTION ORAL at 08:07

## 2022-07-10 RX ADMIN — ALBUTEROL SULFATE 2 PUFF: 90 AEROSOL, METERED RESPIRATORY (INHALATION) at 05:07

## 2022-07-10 RX ADMIN — ROPINIROLE HYDROCHLORIDE 3 MG: 1 TABLET, FILM COATED ORAL at 06:07

## 2022-07-10 RX ADMIN — MULTIPLE VITAMINS W/ MINERALS TAB 1 TABLET: TAB at 08:07

## 2022-07-10 RX ADMIN — HEPARIN SODIUM 5000 UNITS: 5000 INJECTION, SOLUTION INTRAVENOUS; SUBCUTANEOUS at 05:07

## 2022-07-10 RX ADMIN — ALBUTEROL SULFATE 2 PUFF: 90 AEROSOL, METERED RESPIRATORY (INHALATION) at 12:07

## 2022-07-10 RX ADMIN — OXYCODONE HYDROCHLORIDE AND ACETAMINOPHEN 500 MG: 500 TABLET ORAL at 09:07

## 2022-07-10 RX ADMIN — ROPINIROLE HYDROCHLORIDE 1 MG: 1 TABLET, FILM COATED ORAL at 08:07

## 2022-07-10 NOTE — PLAN OF CARE
Problem: Adult Inpatient Plan of Care  Goal: Plan of Care Review  Outcome: Ongoing, Progressing  Flowsheets (Taken 7/10/2022 1304)  Plan of Care Reviewed With: patient  Goal: Patient-Specific Goal (Individualized)  Outcome: Ongoing, Progressing  Goal: Absence of Hospital-Acquired Illness or Injury  Outcome: Ongoing, Progressing  Intervention: Identify and Manage Fall Risk  Flowsheets (Taken 7/10/2022 1304)  Safety Promotion/Fall Prevention:   assistive device/personal item within reach   nonskid shoes/socks when out of bed   medications reviewed   pulse ox   side rails raised x 2  Intervention: Prevent Skin Injury  Flowsheets (Taken 7/10/2022 1304)  Body Position: position changed independently  Skin Protection:   adhesive use limited   tubing/devices free from skin contact  Intervention: Prevent and Manage VTE (Venous Thromboembolism) Risk  Flowsheets (Taken 7/10/2022 1304)  Activity Management: Ambulated -L4  VTE Prevention/Management:   remove, assess skin, and reapply sequential compression device   ROM (active) performed  Range of Motion: active ROM (range of motion) encouraged  Intervention: Prevent Infection  Flowsheets (Taken 7/10/2022 1304)  Infection Prevention: hand hygiene promoted  Goal: Optimal Comfort and Wellbeing  Outcome: Ongoing, Progressing  Goal: Readiness for Transition of Care  Outcome: Ongoing, Progressing     Problem: Device-Related Complication Risk (Hemodialysis)  Goal: Safe, Effective Therapy Delivery  Outcome: Ongoing, Progressing     Problem: Hemodynamic Instability (Hemodialysis)  Goal: Effective Tissue Perfusion  Outcome: Ongoing, Progressing     Problem: Infection (Hemodialysis)  Goal: Absence of Infection Signs and Symptoms  Outcome: Ongoing, Progressing     Problem: Fluid and Electrolyte Imbalance (Acute Kidney Injury/Impairment)  Goal: Fluid and Electrolyte Balance  Outcome: Ongoing, Progressing     Problem: Oral Intake Inadequate (Acute Kidney Injury/Impairment)  Goal: Optimal  Nutrition Intake  Outcome: Ongoing, Progressing     Problem: Renal Function Impairment (Acute Kidney Injury/Impairment)  Goal: Effective Renal Function  Outcome: Ongoing, Progressing     Problem: Infection  Goal: Absence of Infection Signs and Symptoms  Outcome: Ongoing, Progressing     Problem: Fall Injury Risk  Goal: Absence of Fall and Fall-Related Injury  Outcome: Ongoing, Progressing  Intervention: Identify and Manage Contributors  Flowsheets (Taken 7/10/2022 1304)  Self-Care Promotion:   independence encouraged   BADL personal objects within reach  Medication Review/Management: medications reviewed  Intervention: Promote Injury-Free Environment  Flowsheets (Taken 7/10/2022 1304)  Safety Promotion/Fall Prevention:   assistive device/personal item within reach   nonskid shoes/socks when out of bed   medications reviewed   pulse ox   side rails raised x 2

## 2022-07-10 NOTE — PROGRESS NOTES
Renal       Initial 7/3/22 HPI: 67 Y/O female with past history of COPD , PAD , HTN , Depresion , Hyperlipidemia , RLS, admited to ER with complint of SOB and diagnosed with COVID-19. Pt reports that she had peripheral angio 5 days ago and noted decreased UOP    Chief complaint:   None per nurse    Interval History:  Had HD yesterday with 1.5 L UF.  No events repoerted  Down to 1 L NC (O2 sats 89-90% on RA)    ROS:  all else Neg     albuterol  2 puff Inhalation Q6H    ascorbic acid (vitamin C)  500 mg Oral BID    dexamethasone  6 mg Intravenous Daily    [START ON 7/11/2022] epoetin susan-epbx  20,000 Units Subcutaneous Every Mon, Wed, Fri    fluticasone furoate-vilanteroL  1 puff Inhalation Daily    heparin (porcine)  5,000 Units Subcutaneous Q8H    multivitamin  1 tablet Oral Daily    polyethylene glycol  17 g Oral BID    rOPINIRole  1 mg Oral Daily    rOPINIRole  3 mg Oral Daily       VITAL SIGNS: 24 HR MIN & MAX LAST    Temp  Min: 97.2 °F (36.2 °C)  Max: 98.1 °F (36.7 °C)  98.1 °F (36.7 °C)        BP  Min: 101/60  Max: 120/66  105/61     Pulse  Min: 67  Max: 86  75     Resp  Min: 17  Max: 22  20    SpO2  Min: 88 %  Max: 97 %  (!) 94 %      Wt Readings from Last 3 Encounters:   07/07/22 57.6 kg (127 lb)   03/03/21 57.4 kg (126 lb 8.7 oz)       Intake/Output Summary (Last 24 hours) at 7/10/2022 1120  Last data filed at 7/9/2022 1300  Gross per 24 hour   Intake 0 ml   Output 2000 ml   Net -2000 ml     2 L NC  O2 sats 94%  Direct PE deferred d/t COVID isolation    Recent Labs     07/08/22  0409 07/09/22  0712 07/10/22  0500    139 139   K 4.1 4.2 4.1   CHLORIDE 107 106 107   CO2 20* 18* 21*   BUN 45.8* 59.0* 44.0*   CREATININE 6.12* 7.28* 5.26*   GLUCOSE 83 100 90   CALCIUM 8.6 8.8 8.1*   MG  --   --  1.70   PHOS 4.0 4.7 4.1   ALBUMIN 2.7* 2.9* 2.4*      Recent Labs     07/08/22  0409 07/09/22  0712 07/10/22  0500   WBC 9.2 10.9 8.7   HGB 8.5* 8.9* 8.0*   HCT 28.5* 28.0* 24.9*    208 142        ASSESSMENT / PLAN    COVID-19    1   ATN / CKD unspec - s/p tunneled HD cath placement 7/8/22. She remains with poor Uout, uncertain prognosis for renal recovery. Cont HD PRN, had HD yesterday, no need for HD today. Labs daily to dodie for sign of renal recovery  2   COPD, COVID-19 - Decadron, nebs, isolation day # 8  3   Anemia - 1 U RBC transfusion on 7/2, EPO 20K U MWF  monitor H/H  4   PAD / H/o  Stents  5   RLS - ropinirole   6   HTN, AF - rate controlled, VSS  7. Constipation - miralax bid

## 2022-07-10 NOTE — PROGRESS NOTES
[facility]                       [address] [address]    PATIENT NAME:       AMALIA OTERO  YOB: 1955  CSN:                [id number]   MRN:                [id number]  ADMIT DATE:         07/02/2022 14:52:00  PHYSICIAN:          Los Peralta MD                            PROGRESS NOTE    DATE:      SUBJECTIVE:  This is a 66-year-old white female, patient of Dr. Ross.    She was admitted on 07/02.  Initially she came with increased shortness of   breath over the last 5 days prior to her admission.  She had a history of   multiple medical problems.  She had an angiogram to the lower extremity by Dr. Kaplan.  She was weak, not able to get out of bed and with decreased   urination.  She did have some diarrhea.  She continued to smoke.  She was found   to have COVID-19 and acute renal failure.  Her BUN was 95.6, creatinine 17.52.    She was extremely anemic with a hemoglobin of 7.3.  At that point, she was   admitted.  She had a dialysis catheter and dialysis.  She received blood   initially at her admission.  She is doing much better at the present time.  She   is moving around and she is doing incentive spirometry.  She has not had any   fever or chills.  She is still on 2 L and her saturations have been in the low   to mid 90s.  No significant problems.    REVIEW OF SYSTEMS:  X12 as above.    PHYSICAL EXAMINATION:  VITAL SIGNS:  Her blood pressure was 100/62, pulse 77, temp 97.7.  She is   afebrile.    GENERAL:  She is alert, in no acute distress.    HEENT:  Normocephalic, atraumatic.  PERRLA.  EOMI.    NECK:  Supple with no JVD and no bruits.   HEART:  Irregular rhythm and rate.   LUNGS:  She has some bibasilar crackles with occasional wheezing in the upper   lungs.    ABDOMEN:  Soft to my impression, nontender.  Bowel sounds positive and normal.    EXTREMITIES:  No clubbing, cyanosis,  or edema.    NEUROLOGIC:  Nonfocal.  Cranial nerves 2-12 appear intact.    LABS:  Remarkable for a white cell count of 10.9, H and H 9 of 8.9 and 28,   platelets 208.  Her BUN is 59, creatinine is 7.28.  Albumin 2.9.  She did have a   cardiac cath yesterday while she had the dialysis catheter placed with   ultrasound-guided access in the right jugular vein.  She had a sputum culture   with a few yeast and rare normal respiratory danyel.  She was negative for   influenza and BNP was 313 on the 7th.  Iron levels were slightly low.    IMPRESSION:    1. Acute tubal necrosis on chronic kidney disease, unspecified, status post   hemodialysis catheter placement yesterday and dialysis with uncertain prognosis   for renal recovery.    2. She has history of chronic obstructive pulmonary disease.   3. Severe anemia status post transfusion of 1 unit.   4. Peripheral arterial disease.   5. Restless leg syndrome.   6. Hypertension.   7. Paroxysmal atrial fibrillation with controlled rate.    8. She has history of constipation.   9. Tobacco abuse.   10. Depression and anxiety.   11. Dyslipidemia.   12. VIT D deficiency.   13. Hypothyroidism.    14. She was positive for Coronavirus Disease 2019 virus.    15. She had a slightly elevated BNP.  This could be from the fluid overload and   renal failure.    PLAN:  Patient will continue with dialysis as per Nephrology.  Patient will   continue with Ventolin inhalers p.r.n.  She is on dexamethasone 60 mg every day,   fluticasone and vilanterol inhaler, ropinirole, and MiraLAX.  She has been   walking around the room with no problems and she is doing IS.  She is on heparin   5000 every 8 hours for DVT prophylaxis.  We will continue with the current   medications.        ______________________________  MD TIMOTHY Perez/FORTINO  DD:  07/09/2022  Time:  07:09PM  DT:  07/09/2022  Time:  08:03PM  Job #:  [id number]      PROGRESS NOTE

## 2022-07-10 NOTE — PROGRESS NOTES
[facility]                       [address] [address]    PATIENT NAME:       AMALIA OTERO  YOB: 1955  CSN:                [id number]   MRN:                [id number]  ADMIT DATE:         07/02/2022 14:52:00  PHYSICIAN:          Los Peralta MD                            PROGRESS NOTE    DATE:      A 66-year-old white female.  She is about the same today.  She is not going to   get dialysis today as per Dr. Carvajal, nephrology.  She is still on oxygen mask at   the present time with sats in the mid to low 90s.  She denies any chest pains,   no palpitations.  She has not had any fever or chills.  Her vital signs have   been stable.  She just feels weak some.  Denies any nausea or vomiting.  No   abdominal pain or other problems.  No lower extremity pain.  She is on 1 L nasal   cannula.  No significant issues.    REVIEW OF SYSTEMS:  Times 12 as above.    PHYSICAL EXAMINATION:  VITAL SIGNS:  Blood pressure 105/61, pulse 75, temp 98.1.    GENERAL APPEARANCE:  She is alert, in no acute distress.    HEART:  Regular rhythm and rate.   LUNGS:  There are a few rhonchi, mostly in the bases.    ABDOMEN:  Soft, nontender.    EXTREMITIES:  No clubbing, cyanosis, or edema.   NEUROLOGIC:  Nonfocal.    LABS:  Remarkable for a white cell count of 8.7, H and H are 8 and 24.9, which   is slightly lower than yesterday.  Her BUN 44, creatinine 5.26, magnesium 1.7,   phosphorus 4.1.  Sugar was 90.    IMPRESSION:    1. Acute tubal necrosis on chronic kidney disease, unspecified, status post   hemodialysis catheter placement and dialysis with unknown prognosis.    2. History of chronic obstructive pulmonary disease.  3. Peripheral arterial disease.  4. Severe anemia, status post transfusion of 1 unit.  5. Restless legs.  6. Hypertension.  7. Paroxysmal atrial fibrillation, in sinus now, with sinus arrhythmia.  8. History of  constipation.  9. Drug abuse.  10. Depression, anxiety.  11. Dyslipidemia.  12. Vitamin D deficiency.  13. Hypothyroidism.    14. She is positive for Coronavirus.    15. Slightly elevated B-natriuretic peptide.    PLAN:    1. Patient will continue with dialysis per Nephrology.    2. Will continue with nebulizers.  She is on dexamethasone 6 mg every day.  She   is on her regular nebulizers, and she is moving around.    3. She will do DVT prophylaxis with heparin.    4. Will continue with the current treatment.        ______________________________  MD TIMOTHY Perez/KIMBERLYS  DD:  07/10/2022  Time:  12:48PM  DT:  07/10/2022  Time:  01:08PM  Job #:  [id number]      PROGRESS NOTE

## 2022-07-11 PROBLEM — N17.9 AKI (ACUTE KIDNEY INJURY): Status: RESOLVED | Noted: 2022-07-02 | Resolved: 2022-07-11

## 2022-07-11 PROBLEM — R06.02 SHORTNESS OF BREATH: Status: RESOLVED | Noted: 2022-07-02 | Resolved: 2022-07-11

## 2022-07-11 LAB
ALBUMIN SERPL-MCNC: 2.9 GM/DL (ref 3.4–4.8)
BUN SERPL-MCNC: 64.3 MG/DL (ref 9.8–20.1)
CALCIUM SERPL-MCNC: 8.5 MG/DL (ref 8.4–10.2)
CHLORIDE SERPL-SCNC: 107 MMOL/L (ref 98–107)
CO2 SERPL-SCNC: 20 MMOL/L (ref 23–31)
CREAT SERPL-MCNC: 6.22 MG/DL (ref 0.55–1.02)
GLUCOSE SERPL-MCNC: 120 MG/DL (ref 82–115)
LDH SERPL-CCNC: 321 U/L (ref 125–220)
PHOSPHATE SERPL-MCNC: 4.7 MG/DL (ref 2.3–4.7)
POTASSIUM SERPL-SCNC: 4 MMOL/L (ref 3.5–5.1)
SODIUM SERPL-SCNC: 140 MMOL/L (ref 136–145)

## 2022-07-11 PROCEDURE — 27000207 HC ISOLATION

## 2022-07-11 PROCEDURE — 25000003 PHARM REV CODE 250: Performed by: NURSE PRACTITIONER

## 2022-07-11 PROCEDURE — 63600175 PHARM REV CODE 636 W HCPCS

## 2022-07-11 PROCEDURE — 11000001 HC ACUTE MED/SURG PRIVATE ROOM

## 2022-07-11 PROCEDURE — 36415 COLL VENOUS BLD VENIPUNCTURE: CPT | Performed by: NURSE PRACTITIONER

## 2022-07-11 PROCEDURE — 63600175 PHARM REV CODE 636 W HCPCS: Performed by: INTERNAL MEDICINE

## 2022-07-11 PROCEDURE — 80069 RENAL FUNCTION PANEL: CPT | Performed by: INTERNAL MEDICINE

## 2022-07-11 PROCEDURE — 80100014 HC HEMODIALYSIS 1:1

## 2022-07-11 PROCEDURE — 25000003 PHARM REV CODE 250: Performed by: INTERNAL MEDICINE

## 2022-07-11 PROCEDURE — 83615 LACTATE (LD) (LDH) ENZYME: CPT | Performed by: NURSE PRACTITIONER

## 2022-07-11 PROCEDURE — 63600175 PHARM REV CODE 636 W HCPCS: Mod: JG | Performed by: INTERNAL MEDICINE

## 2022-07-11 RX ADMIN — EPOETIN ALFA-EPBX 20000 UNITS: 10000 INJECTION, SOLUTION INTRAVENOUS; SUBCUTANEOUS at 09:07

## 2022-07-11 RX ADMIN — ALBUTEROL SULFATE 2 PUFF: 90 AEROSOL, METERED RESPIRATORY (INHALATION) at 01:07

## 2022-07-11 RX ADMIN — ROPINIROLE HYDROCHLORIDE 1 MG: 1 TABLET, FILM COATED ORAL at 09:07

## 2022-07-11 RX ADMIN — OXYCODONE HYDROCHLORIDE AND ACETAMINOPHEN 500 MG: 500 TABLET ORAL at 09:07

## 2022-07-11 RX ADMIN — MULTIPLE VITAMINS W/ MINERALS TAB 1 TABLET: TAB at 09:07

## 2022-07-11 RX ADMIN — HEPARIN SODIUM 5000 UNITS: 5000 INJECTION, SOLUTION INTRAVENOUS; SUBCUTANEOUS at 05:07

## 2022-07-11 RX ADMIN — OXYCODONE HYDROCHLORIDE AND ACETAMINOPHEN 500 MG: 500 TABLET ORAL at 08:07

## 2022-07-11 RX ADMIN — HEPARIN SODIUM 5000 UNITS: 5000 INJECTION, SOLUTION INTRAVENOUS; SUBCUTANEOUS at 09:07

## 2022-07-11 RX ADMIN — DEXAMETHASONE SODIUM PHOSPHATE 6 MG: 4 INJECTION, SOLUTION INTRA-ARTICULAR; INTRALESIONAL; INTRAMUSCULAR; INTRAVENOUS; SOFT TISSUE at 09:07

## 2022-07-11 RX ADMIN — ALBUTEROL SULFATE 2 PUFF: 90 AEROSOL, METERED RESPIRATORY (INHALATION) at 05:07

## 2022-07-11 RX ADMIN — ROPINIROLE HYDROCHLORIDE 3 MG: 1 TABLET, FILM COATED ORAL at 05:07

## 2022-07-11 RX ADMIN — HEPARIN SODIUM 5000 UNITS: 5000 INJECTION, SOLUTION INTRAVENOUS; SUBCUTANEOUS at 01:07

## 2022-07-11 NOTE — PLAN OF CARE
SW spoke with patient regarding IMM and informed her that she will be notified when it is time for her to be transported to [place]. Patient verbalized understanding.

## 2022-07-11 NOTE — NURSING
Spoke with dr hall whom is on call for dr reynolds and he will plan to replace pt hd catheter tomorrow afternoon. Requested pt be npo after midnight.

## 2022-07-11 NOTE — DISCHARGE SUMMARY
[facility] - 5th Floor [facility] Medicine  Discharge Summary      Patient Name: Natalia Bower  MRN: [id number]  Admission Date: 7/2/2022  Hospital Length of Stay: 9 days  Discharge Date and Time:  07/11/2022 2:02 PM  Attending Physician: Artur Ross MD   Discharging Provider: Artur Ross MD  Primary Care Provider: Nohemy Burgess MD        DC DIAGNOSES :  VOL OVERLOAD  COVID PNEUMONIA  SYLVESTER  ANEMIA  COPD  FVO  AFIB    Procedure(s) (LRB):  INSERTION, CATHETER, CENTRAL VENOUS, HEMODIALYSIS, TUNNELED (N/A)      Hospital Course:   65 yo female, who presented to the ED with increased sob over past 5 days. PMH COPD, Smoker, PAD, PVD, HTN, Depression, HLD, and RLS. Per family report, patient had angiogram of lower extremity Monday with Dr. Kaplan. Patient was having some SOB when she was at appointment and became worse over past couple of days. Patient was unable to get out of bed as she was so weak. Pt states that she only urinated once in the last 24 hours. Pt attempted to eat this am and had diarrhea. Pt does continue to smoke, however has not smoked in past 3 days. Pt was found to be COVID 19 + and in ARF on admit with a BUN/ CR of 95.6/17.52 respectively and Dr. Cadet has been consulted. Pt also found to be anemic with Hgb of 7.3. She will receive one unit of blood. Patient was vaccinated for COVID.  Pt being admit for SOB s/t COVID19 diagnosis, ARF and FVO.   Sob improved  Afebrile  On nasal cannula and po steroids  Tolerated hd after initiation by renal  Poor functional access with ij later had tunnel cath placement by vascular  Remains weak  And sob improving  toleratingh hd  Anemia stable and dint dropped much  I dont think she a candidate or requiring lomng term hd  Urine out put improving  Will dc to [facility] for further care of weaning off of o2 and hd as needed with renal appropriation       Consults:   Consults (From admission, onward)        Status Ordering  Provider     Inpatient consult to Peripheral Vascular Surgery  Once        Provider:  Benoit Soliman III, MD    Acknowledged SHERRIE ABEBE     Inpatient consult to Social Work/Case Management  Once        Provider:  (Not yet assigned)    KRYSTLE Mtz     Inpatient consult to General Surgery  Once        Provider:  Samuel Miller MD    Acknowledged SHERRIE ABEBE     Inpatient consult to General Surgery  Once        Provider:  Valdemar Augustin MD    Acknowledged SHERRIE ABEBE     Inpatient consult to General Surgery  Once        Provider:  Rusty Boone MD    Acknowledged ERIK HERRERA     Inpatient consult to Nephrology  Once        Provider:  Jayesh Rausch MD    Acknowledged ERIK HERRERA          Final Active Diagnoses:    Diagnosis Date Noted POA    COVID-19 [U07.1] 2022 Yes      Problems Resolved During this Admission:    Diagnosis Date Noted Date Resolved POA    PRINCIPAL PROBLEM:  Shortness of breath [R06.02] 2022 Yes    SYLVESTER (acute kidney injury) [N17.9] 2022 Yes      Discharged Condition: fair    Disposition: Long Term Care    Follow Up:    Patient Instructions:      Diet Adult Regular     COVID-19 Surveillance Program     Order Specific Question Answer Comments   Does patient have a smartphone? No    Does patient have the [facility] on their smartphone? No    While in surveillance program, will patient be using home oxygen? No      Activity as tolerated     Medications:  Reconciled Home Medications:      Medication List      CONTINUE taking these medications    albuterol 90 mcg/actuation inhaler  Commonly known as: PROVENTIL/VENTOLIN HFA  SMARTSI Puff(s) By Mouth Every 4 Hours PRN     amLODIPine 5 MG tablet  Commonly known as: NORVASC  Take 5 mg by mouth once daily.     aspirin 81 MG EC tablet  Commonly known as: ECOTRIN  Take 81 mg by mouth.     buPROPion 150 MG TBSR 12 hr tablet  Commonly known as:  WELLBUTRIN SR  Take 150 mg by mouth 2 (two) times daily.     clopidogreL 75 mg tablet  Commonly known as: PLAVIX  Take 75 mg by mouth once daily.     co-enzyme Q-10 30 mg capsule  Take 100 mg by mouth once daily.     DECARA 1,250 mcg (50,000 unit) capsule  Generic drug: cholecalciferol (vitamin D3)  Take 50,000 Units by mouth every 7 days.     diazePAM 5 MG tablet  Commonly known as: VALIUM  SMARTSI-2 Tablet(s) By Mouth PRN     ELIQUIS 5 mg Tab  Generic drug: apixaban  Take 5 mg by mouth 2 (two) times daily.     EUTHYROX 75 MCG tablet  Generic drug: levothyroxine  Take 75 mcg by mouth once daily.     fluticasone-salmeterol 250-50 mcg/dose 250-50 mcg/dose diskus inhaler  Commonly known as: ADVAIR  Inhale 1 puff into the lungs.     furosemide 20 MG tablet  Commonly known as: LASIX  Take by mouth once daily. As needed for edema     lisinopriL 20 MG tablet  Commonly known as: PRINIVIL,ZESTRIL  Take 20 mg by mouth once daily.     * rOPINIRole 3 MG tablet  Commonly known as: REQUIP  Take 3 mg by mouth once daily.     * rOPINIRole 1 MG tablet  Commonly known as: REQUIP  Take 1 mg by mouth once daily.     rosuvastatin 40 MG Tab  Commonly known as: CRESTOR  Take 40 mg by mouth nightly.     SPIRIVA WITH HANDIHALER 18 mcg inhalation capsule  Generic drug: tiotropium  1 capsule once daily.     SYMBICORT 160-4.5 mcg/actuation Hfaa  Generic drug: budesonide-formoterol 160-4.5 mcg  SMARTSI Puff(s) By Mouth Twice Daily         * This list has 2 medication(s) that are the same as other medications prescribed for you. Read the directions carefully, and ask your doctor or other care provider to review them with you.            STOP taking these medications    prednisoLONE acetate 1 % Drps  Commonly known as: PRED FORTE            Significant Diagnostic Studies: Labs:   BMP:   Recent Labs   Lab 07/10/22  0500 22  0926    140   K 4.1 4.0   CO2 21* 20*   BUN 44.0* 64.3*   CREATININE 5.26* 6.22*   CALCIUM 8.1* 8.5   MG  1.70  --        Pending Diagnostic Studies:     Procedure Component Value Units Date/Time    Lactate Dehydrogenase [[id number]]     Order Status: Sent Lab Status: No result     Specimen: Blood     Lactate Dehydrogenase [[id number]]     Order Status: Sent Lab Status: No result     Specimen: Blood         Indwelling Lines/Drains at time of discharge:   Lines/Drains/Airways     Central Venous Catheter Line  Duration           Tunneled Central Line Insertion/Assessment - Double Lumen  07/08/22  right subclavian 3 days                Time spent on the discharge of patient: 36 minutes         Artur Ross MD  Department of Hospital Medicine  [facility] - 5th Floor Med Surg

## 2022-07-11 NOTE — NURSING
07/11/22 1715   Tunneled Central Line Insertion/Assessment - Double Lumen  07/08/22  right subclavian   Placement Date/Time: 07/08/22 (c)   Location: right subclavian   Site Assessment No drainage;No redness;No swelling;No warmth   Dressing Type Biopatch in place;Transparent (Tegaderm)   Dressing Status Clean;Dry;Intact   Dressing Intervention Integrity maintained   Post-Hemodialysis Assessment   Rinseback Volume (mL) 250 mL   Blood Volume Processed (Liters) 32.1 L   Dialyzer Clearance Clear   Total UF (mL) 1472 mL   Net Fluid Removal 972   Patient Response to Treatment Cath. not functioning properly. In constant alarm with 300-400 BFR. Very positional. Nephrology made aware. Instructed to notify Floor RN to consult surgery for cath. replacement. Nursing notified and acknowledged.   Post-Hemodialysis Comments Blood rinsed back per P&P. Lines capped and secured.

## 2022-07-11 NOTE — PROGRESS NOTES
Renal on call       Initial 7/3/22 HPI: 67 Y/O female admited to ER with complint of SOB and diagnosed with COVID-19. Pt reports that she had peripheral angio 5 days ago and noted decreased UOP. Also has past history of COPD , PAD , HTN , Depresion , Hyperlipidemia , RLS,     Chief complaint:   None per nurse    Interval History:  1 L Oximask, sats 92%  No events noted    ROS:  all else Neg     albuterol  2 puff Inhalation Q6H    ascorbic acid (vitamin C)  500 mg Oral BID    dexamethasone  6 mg Intravenous Daily    epoetin susan-epbx  20,000 Units Subcutaneous Every Mon, Wed, Fri    fluticasone furoate-vilanteroL  1 puff Inhalation Daily    heparin (porcine)  5,000 Units Subcutaneous Q8H    multivitamin  1 tablet Oral Daily    polyethylene glycol  17 g Oral BID    rOPINIRole  1 mg Oral Daily    rOPINIRole  3 mg Oral Daily       VITAL SIGNS: 24 HR MIN & MAX LAST    Temp  Min: 97.4 °F (36.3 °C)  Max: 97.8 °F (36.6 °C)  97.7 °F (36.5 °C)        BP  Min: 94/61  Max: 122/64  94/61     Pulse  Min: 60  Max: 81  76     Resp  Min: 16  Max: 20  18    SpO2  Min: 90 %  Max: 94 %  (!) 92 %      Wt Readings from Last 3 Encounters:   07/07/22 57.6 kg (127 lb)   03/03/21 57.4 kg (126 lb 8.7 oz)       Intake/Output Summary (Last 24 hours) at 7/11/2022 1007  Last data filed at 7/10/2022 1300  Gross per 24 hour   Intake 320 ml   Output --   Net 320 ml     2 L NC  O2 sats 94%  Direct PE deferred d/t COVID isolation    Recent Labs     07/09/22  0712 07/10/22  0500 07/11/22  0926    139 140   K 4.2 4.1 4.0   CHLORIDE 106 107 107   CO2 18* 21* 20*   BUN 59.0* 44.0* 64.3*   CREATININE 7.28* 5.26* 6.22*   GLUCOSE 100 90 120*   CALCIUM 8.8 8.1* 8.5   MG  --  1.70  --    PHOS 4.7 4.1 4.7   ALBUMIN 2.9* 2.4* 2.9*      Recent Labs     07/09/22  0712 07/10/22  0500   WBC 10.9 8.7   HGB 8.9* 8.0*   HCT 28.0* 24.9*    142       ASSESSMENT / PLAN    COVID-19    1   ATN / CKD unspec - s/p tunneled HD cath placement 7/8/22. She  remains with poor Uout, uncertain prognosis for renal recovery. Cont HD MWF and labs daily to [facility] for sign of renal recovery  2   COPD, COVID-19 - Decadron, nebs, isolation day # 9  3   Anemia - 1 U RBC transfusion on 7/2, EPO 20K U MWF  monitor H/H  4   PAD / H/o  Stents  5   RLS - ropinirole   6   HTN, AF - rate controlled, VSS  7. Constipation - miralax bid

## 2022-07-11 NOTE — PLAN OF CARE
SW spoke with patient and discussed discharge plan. Patient was told that she would be picked up at 1 pm via [facility] transport. Patient verbalized understanding.

## 2022-07-12 PROCEDURE — 25000003 PHARM REV CODE 250: Performed by: NURSE PRACTITIONER

## 2022-07-12 PROCEDURE — C1769 GUIDE WIRE: HCPCS | Performed by: SURGERY

## 2022-07-12 PROCEDURE — C1750 CATH, HEMODIALYSIS,LONG-TERM: HCPCS | Performed by: SURGERY

## 2022-07-12 PROCEDURE — 99152 MOD SED SAME PHYS/QHP 5/>YRS: CPT | Performed by: SURGERY

## 2022-07-12 PROCEDURE — 94761 N-INVAS EAR/PLS OXIMETRY MLT: CPT

## 2022-07-12 PROCEDURE — 25000003 PHARM REV CODE 250: Performed by: SURGERY

## 2022-07-12 PROCEDURE — C1887 CATHETER, GUIDING: HCPCS | Performed by: SURGERY

## 2022-07-12 PROCEDURE — 11000001 HC ACUTE MED/SURG PRIVATE ROOM

## 2022-07-12 PROCEDURE — 36558 INSERT TUNNELED CV CATH: CPT | Performed by: SURGERY

## 2022-07-12 PROCEDURE — 63600175 PHARM REV CODE 636 W HCPCS: Performed by: INTERNAL MEDICINE

## 2022-07-12 PROCEDURE — 27000221 HC OXYGEN, UP TO 24 HOURS

## 2022-07-12 PROCEDURE — 25000003 PHARM REV CODE 250: Performed by: INTERNAL MEDICINE

## 2022-07-12 PROCEDURE — 63600175 PHARM REV CODE 636 W HCPCS

## 2022-07-12 PROCEDURE — 27000207 HC ISOLATION

## 2022-07-12 PROCEDURE — 63600175 PHARM REV CODE 636 W HCPCS: Performed by: SURGERY

## 2022-07-12 PROCEDURE — 77001 FLUOROGUIDE FOR VEIN DEVICE: CPT | Performed by: SURGERY

## 2022-07-12 PROCEDURE — 99153 MOD SED SAME PHYS/QHP EA: CPT | Performed by: SURGERY

## 2022-07-12 PROCEDURE — 27201423 OPTIME MED/SURG SUP & DEVICES STERILE SUPPLY: Performed by: SURGERY

## 2022-07-12 PROCEDURE — 25000242 PHARM REV CODE 250 ALT 637 W/ HCPCS: Performed by: INTERNAL MEDICINE

## 2022-07-12 DEVICE — CATH DURA-MAX 15.5F X 24CM: Type: IMPLANTABLE DEVICE | Site: NECK | Status: FUNCTIONAL

## 2022-07-12 RX ORDER — CEFAZOLIN SODIUM 1 G/3ML
INJECTION, POWDER, FOR SOLUTION INTRAMUSCULAR; INTRAVENOUS
Status: DISCONTINUED | OUTPATIENT
Start: 2022-07-12 | End: 2022-07-14 | Stop reason: HOSPADM

## 2022-07-12 RX ORDER — FENTANYL CITRATE 50 UG/ML
INJECTION, SOLUTION INTRAMUSCULAR; INTRAVENOUS
Status: DISCONTINUED | OUTPATIENT
Start: 2022-07-12 | End: 2022-07-14 | Stop reason: HOSPADM

## 2022-07-12 RX ORDER — LIDOCAINE HYDROCHLORIDE 10 MG/ML
INJECTION, SOLUTION EPIDURAL; INFILTRATION; INTRACAUDAL; PERINEURAL
Status: DISCONTINUED | OUTPATIENT
Start: 2022-07-12 | End: 2022-07-14 | Stop reason: HOSPADM

## 2022-07-12 RX ADMIN — HEPARIN SODIUM 5000 UNITS: 5000 INJECTION, SOLUTION INTRAVENOUS; SUBCUTANEOUS at 04:07

## 2022-07-12 RX ADMIN — OXYCODONE HYDROCHLORIDE AND ACETAMINOPHEN 500 MG: 500 TABLET ORAL at 08:07

## 2022-07-12 RX ADMIN — OXYCODONE HYDROCHLORIDE AND ACETAMINOPHEN 500 MG: 500 TABLET ORAL at 09:07

## 2022-07-12 RX ADMIN — ALBUTEROL SULFATE 2 PUFF: 90 AEROSOL, METERED RESPIRATORY (INHALATION) at 09:07

## 2022-07-12 RX ADMIN — FLUTICASONE FUROATE AND VILANTEROL TRIFENATATE 1 PUFF: 100; 25 POWDER RESPIRATORY (INHALATION) at 09:07

## 2022-07-12 RX ADMIN — MULTIPLE VITAMINS W/ MINERALS TAB 1 TABLET: TAB at 09:07

## 2022-07-12 RX ADMIN — DEXAMETHASONE SODIUM PHOSPHATE 6 MG: 4 INJECTION, SOLUTION INTRA-ARTICULAR; INTRALESIONAL; INTRAMUSCULAR; INTRAVENOUS; SOFT TISSUE at 09:07

## 2022-07-12 RX ADMIN — ROPINIROLE HYDROCHLORIDE 3 MG: 1 TABLET, FILM COATED ORAL at 08:07

## 2022-07-12 RX ADMIN — ALBUTEROL SULFATE 2 PUFF: 90 AEROSOL, METERED RESPIRATORY (INHALATION) at 08:07

## 2022-07-12 RX ADMIN — ROPINIROLE HYDROCHLORIDE 1 MG: 1 TABLET, FILM COATED ORAL at 09:07

## 2022-07-12 NOTE — PROGRESS NOTES
Renal on call       Initial 7/3/22 HPI: 65 Y/O female admited to ER with complint of SOB and diagnosed with COVID-19. Pt reports that she had peripheral angio 5 days ago and noted decreased UOP. Also has past history of COPD , PAD , HTN , Depresion , Hyperlipidemia , RLS,     Chief complaint:   None per nurse    Interval History:  Attempted HD yesterday blood tunnel HD catheter was dysfunctional.  Unable to get full treatment but was able to be 1.4 L UF. Had new tunneled HD cath placed this AM by Dr. Myers. Has no c/o at this time    ROS:  all else Neg     albuterol  2 puff Inhalation Q6H    ascorbic acid (vitamin C)  500 mg Oral BID    dexamethasone  6 mg Intravenous Daily    epoetin susan-epbx  20,000 Units Subcutaneous Every Mon, Wed, Fri    fluticasone furoate-vilanteroL  1 puff Inhalation Daily    heparin (porcine)  5,000 Units Subcutaneous Q8H    multivitamin  1 tablet Oral Daily    polyethylene glycol  17 g Oral BID    rOPINIRole  1 mg Oral Daily    rOPINIRole  3 mg Oral Daily       VITAL SIGNS: 24 HR MIN & MAX LAST    Temp  Min: 97.5 °F (36.4 °C)  Max: 98.2 °F (36.8 °C)  97.7 °F (36.5 °C)        BP  Min: 93/54  Max: 122/65  122/65     Pulse  Min: 64  Max: 82  64     Resp  Min: 18  Max: 18  18    SpO2  Min: 92 %  Max: 95 %  95 %      Wt Readings from Last 3 Encounters:   07/07/22 57.6 kg (127 lb)   03/03/21 57.4 kg (126 lb 8.7 oz)       Intake/Output Summary (Last 24 hours) at 7/12/2022 1635  Last data filed at 7/11/2022 1715  Gross per 24 hour   Intake --   Output 1472 ml   Net -1472 ml     A&O x 4  NAD  Oxi mask  R CW HD cath  unlabored    Recent Labs     07/10/22  0500 07/11/22  0926    140   K 4.1 4.0   CHLORIDE 107 107   CO2 21* 20*   BUN 44.0* 64.3*   CREATININE 5.26* 6.22*   GLUCOSE 90 120*   CALCIUM 8.1* 8.5   MG 1.70  --    PHOS 4.1 4.7   ALBUMIN 2.4* 2.9*      Recent Labs     07/10/22  0500   WBC 8.7   HGB 8.0*   HCT 24.9*          ASSESSMENT / PLAN    COVID-19    1   ATN / CKD  unspec - Uncertain prognosis for renal recovery.  Continue HD t.i.w., next HD planned for tomorrow. Had tunneled HD cath placement 7/8/22, became dysfunctional and replaced on 7/12/22 by Dr. Myers  2   COPD, COVID-19 - Decadron, nebs, isolation day # 10  3   Anemia - 1 U RBC transfusion on 7/2, EPO 20K U MWF  monitor H/H  4   PAD / H/o  Stents  5   RLS - ropinirole   6   HTN, AF - rate controlled, VSS  7. Constipation - miralax bid    OK to go to [facility] @ [facility] if HD cath works well with HD tomorrow

## 2022-07-12 NOTE — PROGRESS NOTES
"Internal Medicine Progress Note    Subjective/Interval History:  This is a 65 yo female, who presented to the ED with increased sob over past 5 days. PMH COPD, Smoker, PAD, PVD, HTN, Depression, HLD, and RLS. Per family report, patient had angiogram of lower extremity Monday with Dr. Kaplan. Patient was having some SOB when she was at appointment and became worse over past couple of days. Patient was unable to get out of bed as she was so weak. Pt states that she only urinated once in the last 24 hours. Pt attempted to eat this am and had diarrhea. Pt does continue to smoke, however has not smoked in past 3 days. Pt was found to be COVID 19 + and in ARF on admit with a BUN/ CR of 95.6/17.52 respectively and Dr. Cadet has been consulted. Pt also found to be anemic with Hgb of 7.3. She will receive one unit of blood. Patient was vaccinated for COVID.  Pt being admit for SOB s/t COVID19 diagnosis, ARF and FVO.     7-3-22  Pt with some increased SOB during HD  Did urinate x 1 today  Labs essentially unchanged  Remains with productive cough  On 5 liters O2 sating 90%  Issues with her RLS today      7/6 : Today's info : seen and examined, no acute events overnight. Continues to improve   Sob improving  Tolerating hd  h and h low and stable    7/11 Today's info : seen and examined, no acute events overnight. Continues to improve   Post tunnel cath placement  Pending exchange    ROS:  Generalized - weakness, poor intake, fatigue,   SOB at rest  Denies abd pain  Urinated x 1 this am    Vital Signs:  /78   Pulse 83   Temp 97.6 °F (36.4 °C)   Resp 18   Ht 5' 2" (1.575 m)   Wt 57.6 kg (127 lb)   SpO2 (!) 93%   Breastfeeding No   BMI 23.23 kg/m²   Body mass index is 23.23 kg/m².    Physical Exam:  Generalized - ill appearing female    HEENT - NC AT [facility]  CVA - S1 S2, IRR  Resp - bilateral diminished, rhonchi, with mild exp wheeze, use of accessory muscles  Abd- soft, NT ND BS +  EXT - trace BLE edema  Neuro - " II - XII Grossly intact  HD Cath to [facility]    Labs:  Recent Results (from the past 48 hour(s))   Magnesium    Collection Time: 07/10/22  5:00 AM   Result Value Ref Range    Magnesium Level 1.70 1.60 - 2.60 mg/dL   CBC with Differential    Collection Time: 07/10/22  5:00 AM   Result Value Ref Range    WBC 8.7 4.5 - 11.5 x10(3)/mcL    RBC 2.96 (L) 4.20 - 5.40 x10(6)/mcL    Hgb 8.0 (L) 12.0 - 16.0 gm/dL    Hct 24.9 (L) 37.0 - 47.0 %    MCV 84.1 80.0 - 94.0 fL    MCH 27.0 27.0 - 31.0 pg    MCHC 32.1 (L) 33.0 - 36.0 mg/dL    RDW 16.5 11.5 - 17.0 %    Platelet 142 130 - 400 x10(3)/mcL    MPV 9.5 7.4 - 10.4 fL    Neut % 76.6 %    Lymph % 13.8 %    Mono % 7.1 %    Eos % 0.0 %    Basophil % 0.0 %    Lymph # 1.20 0.6 - 4.6 x10(3)/mcL    Neut # 6.7 2.1 - 9.2 x10(3)/mcL    Mono # 0.62 0.1 - 1.3 x10(3)/mcL    Eos # 0.00 0 - 0.9 x10(3)/mcL    Baso # 0.00 0 - 0.2 x10(3)/mcL    IG# 0.22 (H) 0 - 0.04 x10(3)/mcL    IG% 2.5 %    NRBC% 0.5 %   Renal Function Panel    Collection Time: 07/10/22  5:00 AM   Result Value Ref Range    Sodium Level 139 136 - 145 mmol/L    Potassium Level 4.1 3.5 - 5.1 mmol/L    Chloride 107 98 - 107 mmol/L    Carbon Dioxide 21 (L) 23 - 31 mmol/L    Glucose Level 90 82 - 115 mg/dL    Blood Urea Nitrogen 44.0 (H) 9.8 - 20.1 mg/dL    Creatinine 5.26 (H) 0.55 - 1.02 mg/dL    Calcium Level Total 8.1 (L) 8.4 - 10.2 mg/dL    Albumin Level 2.4 (L) 3.4 - 4.8 gm/dL    Phosphorus Level 4.1 2.3 - 4.7 mg/dL    Estimated GFR-Non  9 mls/min/1.73/m2   Lactate Dehydrogenase    Collection Time: 07/11/22  9:26 AM   Result Value Ref Range    Lactate Dehydrogenase 321 (H) 125 - 220 U/L   Renal Function Panel    Collection Time: 07/11/22  9:26 AM   Result Value Ref Range    Sodium Level 140 136 - 145 mmol/L    Potassium Level 4.0 3.5 - 5.1 mmol/L    Chloride 107 98 - 107 mmol/L    Carbon Dioxide 20 (L) 23 - 31 mmol/L    Glucose Level 120 (H) 82 - 115 mg/dL    Blood Urea Nitrogen 64.3 (H) 9.8 - 20.1 mg/dL    Creatinine  6.22 (H) 0.55 - 1.02 mg/dL    Calcium Level Total 8.5 8.4 - 10.2 mg/dL    Albumin Level 2.9 (L) 3.4 - 4.8 gm/dL    Phosphorus Level 4.7 2.3 - 4.7 mg/dL    Estimated GFR-Non  7 mls/min/1.73/m2         Assessment/Plan:  1. SOB              Titrate oxygen to maintain sat >90%   Duo Nebs   Brio  2. COVID 19 +              Supportive therapy - unable to receive Remdesivir s/t to renal fx    On decadron  3. SYLVESTER              Dr. Rausch - renal consulted -HD today  4. Anemia              Transfusing 1 unit blood              Check stool focb              Iron studies  5. COPD - Smoker  6. FVO  7. A fib              Heparin - 5000 sq q8    Continue aggressive supportive care  Renal and pulm reccs  actemra if worse respi status    dispo :[facility]ac soon  Once tunnel cath placed         Artur Ross MD

## 2022-07-12 NOTE — PROGRESS NOTES
"Internal Medicine Progress Note    Subjective/Interval History:  This is a 65 yo female, who presented to the ED with increased sob over past 5 days. PMH COPD, Smoker, PAD, PVD, HTN, Depression, HLD, and RLS. Per family report, patient had angiogram of lower extremity Monday with Dr. Kaplan. Patient was having some SOB when she was at appointment and became worse over past couple of days. Patient was unable to get out of bed as she was so weak. Pt states that she only urinated once in the last 24 hours. Pt attempted to eat this am and had diarrhea. Pt does continue to smoke, however has not smoked in past 3 days. Pt was found to be COVID 19 + and in ARF on admit with a BUN/ CR of 95.6/17.52 respectively and Dr. Cadet has been consulted. Pt also found to be anemic with Hgb of 7.3. She will receive one unit of blood. Patient was vaccinated for COVID.  Pt being admit for SOB s/t COVID19 diagnosis, ARF and FVO.     7-3-22  Pt with some increased SOB during HD  Did urinate x 1 today  Labs essentially unchanged  Remains with productive cough  On 5 liters O2 sating 90%  Issues with her RLS today      7/6 : Today's info : seen and examined, no acute events overnight. Continues to improve   Sob improving  Tolerating hd  h and h low and stable    7/12 Today's info : seen and examined, no acute events overnight. Continues to improve   Post tunnel cath placement  Pending exchange cath placement  h and h better and stable    ROS:  Generalized - weakness, poor intake, fatigue,   SOB at rest  Denies abd pain      Vital Signs:  /67   Pulse 70   Temp 98 °F (36.7 °C) (Oral)   Resp 18   Ht 5' 2" (1.575 m)   Wt 57.6 kg (127 lb)   SpO2 (!) 94%   Breastfeeding No   BMI 23.23 kg/m²   Body mass index is 23.23 kg/m².    Physical Exam:  Generalized - ill appearing female    [facility]  CVA - S1 S2, IRR  Resp - bilateral diminished, rhonchi, with mild exp wheeze, use of accessory muscles  Abd- soft, NT ND BS +  EXT - " trace BLE edema  Neuro - II - XII Grossly intact  HD Cath to [facility]    Labs:  Recent Results (from the past 48 hour(s))   Lactate Dehydrogenase    Collection Time: 07/11/22  9:26 AM   Result Value Ref Range    Lactate Dehydrogenase 321 (H) 125 - 220 U/L   Renal Function Panel    Collection Time: 07/11/22  9:26 AM   Result Value Ref Range    Sodium Level 140 136 - 145 mmol/L    Potassium Level 4.0 3.5 - 5.1 mmol/L    Chloride 107 98 - 107 mmol/L    Carbon Dioxide 20 (L) 23 - 31 mmol/L    Glucose Level 120 (H) 82 - 115 mg/dL    Blood Urea Nitrogen 64.3 (H) 9.8 - 20.1 mg/dL    Creatinine 6.22 (H) 0.55 - 1.02 mg/dL    Calcium Level Total 8.5 8.4 - 10.2 mg/dL    Albumin Level 2.9 (L) 3.4 - 4.8 gm/dL    Phosphorus Level 4.7 2.3 - 4.7 mg/dL    Estimated GFR-Non  7 mls/min/1.73/m2         Assessment/Plan:  1. SOB              Titrate oxygen to maintain sat >90%   Duo Nebs   Brio  2. COVID 19 +              Supportive therapy - unable to receive Remdesivir s/t to renal fx    On decadron  3. SYLVESTER              Dr. Rausch - renal consulted -HD today  4. Anemia              Transfusing 1 unit blood              Check stool focb              Iron studies  5. COPD - Smoker  6. FVO  7. A fib              Heparin - 5000 sq q8    Continue aggressive supportive care  Renal and pulm reccs  actemra if worse respi status    dispo :ltac soon  Once tunnel cath placed         Artur Ross MD

## 2022-07-13 LAB
ALBUMIN SERPL-MCNC: 2.8 GM/DL (ref 3.4–4.8)
BUN SERPL-MCNC: 53.4 MG/DL (ref 9.8–20.1)
CALCIUM SERPL-MCNC: 8.3 MG/DL (ref 8.4–10.2)
CHLORIDE SERPL-SCNC: 105 MMOL/L (ref 98–107)
CO2 SERPL-SCNC: 21 MMOL/L (ref 23–31)
CREAT SERPL-MCNC: 4.21 MG/DL (ref 0.55–1.02)
GLUCOSE SERPL-MCNC: 95 MG/DL (ref 82–115)
LDH SERPL-CCNC: 253 U/L (ref 125–220)
PHOSPHATE SERPL-MCNC: 5.2 MG/DL (ref 2.3–4.7)
POTASSIUM SERPL-SCNC: 3.8 MMOL/L (ref 3.5–5.1)
SODIUM SERPL-SCNC: 140 MMOL/L (ref 136–145)

## 2022-07-13 PROCEDURE — 83615 LACTATE (LD) (LDH) ENZYME: CPT | Performed by: NURSE PRACTITIONER

## 2022-07-13 PROCEDURE — 25000003 PHARM REV CODE 250: Performed by: NURSE PRACTITIONER

## 2022-07-13 PROCEDURE — 80069 RENAL FUNCTION PANEL: CPT | Performed by: INTERNAL MEDICINE

## 2022-07-13 PROCEDURE — 80100016 HC MAINTENANCE HEMODIALYSIS

## 2022-07-13 PROCEDURE — 25000003 PHARM REV CODE 250: Performed by: INTERNAL MEDICINE

## 2022-07-13 PROCEDURE — 36415 COLL VENOUS BLD VENIPUNCTURE: CPT | Performed by: INTERNAL MEDICINE

## 2022-07-13 PROCEDURE — 11000001 HC ACUTE MED/SURG PRIVATE ROOM

## 2022-07-13 PROCEDURE — 36415 COLL VENOUS BLD VENIPUNCTURE: CPT | Performed by: NURSE PRACTITIONER

## 2022-07-13 PROCEDURE — 63600175 PHARM REV CODE 636 W HCPCS: Performed by: INTERNAL MEDICINE

## 2022-07-13 PROCEDURE — 27000207 HC ISOLATION

## 2022-07-13 PROCEDURE — 63600175 PHARM REV CODE 636 W HCPCS

## 2022-07-13 RX ADMIN — ALBUTEROL SULFATE 2 PUFF: 90 AEROSOL, METERED RESPIRATORY (INHALATION) at 01:07

## 2022-07-13 RX ADMIN — ACETAMINOPHEN 650 MG: 325 TABLET, FILM COATED ORAL at 01:07

## 2022-07-13 RX ADMIN — MULTIPLE VITAMINS W/ MINERALS TAB 1 TABLET: TAB at 01:07

## 2022-07-13 RX ADMIN — HEPARIN SODIUM 5000 UNITS: 5000 INJECTION, SOLUTION INTRAVENOUS; SUBCUTANEOUS at 05:07

## 2022-07-13 RX ADMIN — ROPINIROLE HYDROCHLORIDE 1 MG: 1 TABLET, FILM COATED ORAL at 01:07

## 2022-07-13 RX ADMIN — FLUTICASONE FUROATE AND VILANTEROL TRIFENATATE 1 PUFF: 100; 25 POWDER RESPIRATORY (INHALATION) at 01:07

## 2022-07-13 RX ADMIN — ALBUTEROL SULFATE 2 PUFF: 90 AEROSOL, METERED RESPIRATORY (INHALATION) at 05:07

## 2022-07-13 RX ADMIN — HEPARIN SODIUM 5000 UNITS: 5000 INJECTION, SOLUTION INTRAVENOUS; SUBCUTANEOUS at 01:07

## 2022-07-13 RX ADMIN — DEXAMETHASONE SODIUM PHOSPHATE 6 MG: 4 INJECTION, SOLUTION INTRA-ARTICULAR; INTRALESIONAL; INTRAMUSCULAR; INTRAVENOUS; SOFT TISSUE at 01:07

## 2022-07-13 RX ADMIN — ROPINIROLE HYDROCHLORIDE 3 MG: 1 TABLET, FILM COATED ORAL at 07:07

## 2022-07-13 RX ADMIN — OXYCODONE HYDROCHLORIDE AND ACETAMINOPHEN 500 MG: 500 TABLET ORAL at 01:07

## 2022-07-13 RX ADMIN — OXYCODONE HYDROCHLORIDE AND ACETAMINOPHEN 500 MG: 500 TABLET ORAL at 07:07

## 2022-07-13 NOTE — OP NOTE
Vascular Surgery  Procedure Note     Patient Name: Natalia Bower  Age: 66 y.o.  Sex: female  YOB: 1955  MRN: [id number]  Author: Dejan Myers MD  Location: [facility]     Date: 7/12/22     PREOPERATIVE DIAGNOSIS:    Malfunctioning right IJ tunneled line   Acute on chronic renal failure      POSTOPERATIVE DIAGNOSIS: Same     PROCEDURE PERFORMED:  Right IJ tunneled line catheter insertion with US guidance access and Fluoroscopy     SURGEON: Dejan Myers M.D.     ASSISTANT:  None      ANESTHESIA: Local anesthesia     FINDINGS:   There was no kinking in the course of the catheter.  The catheter tip terminates in the distal SVC.  Both lumens aspirated and flushed easily.        INDICATIONS:  The patient is a 66 y.o. female with history of acute renal failure patient recently has tunneled line place by Dr. Soliman however became dysfunctional. Need exchanged vs new line placement.        DESCRIPTION OF PROCEDURE:     The patient was brought back to the operating room and placed in the supine position. Conscious sedation was administered.  The neck was prepped and draped in the usual sterile fashion. The old catheter was was prepped and draped under the usual sterile fashion. Attempts were made to exchange the new catheter over the wire after removal of the old catheter however, unsuccessful.     Ultrasound was used to evaluate the right internal jugular, which was patent. 1% lidocaine was used for local anesthesia of the neck to the deltopectoral groove.  Ultrasound guidance was used to access the right  internal jugular vein with a needle in real time with permanent recording and reporting.  The wire was then advanced into the needle and advanced into the superior vena cava.  An incision was made in the neck with an 11 blade.  An incision was made with an 11 blade in the deltopectoral groove.  The catheter was then tunneled from the incision at the deltopectoral groove to the neck  incision.  At this time, under fluoroscopy in the track was dilated with the dilator x2.  The sheath was then advanced over the wire into the right superior vena cava.  The catheter was advanced into the peel-away sheath and the peel-away sheath was removed.  There was no kinking in the course of the catheter.  The catheter aspirated and flushed easily.  Heparin at 1000 units/cc was used to lock the lumens of the catheter.  The catheter was then secured with 2-0 nylon suture.  Incision at the neck was closed with 4-0 Monocryl in a subcuticular fashion.  Dermabond was applied to the incision in the neck.  A sterile dressing was applied to the catheter.      Attestation: I was present for the entirety of the procedure.    Dejan Myers MD  7/12/2022      Note:  Catheter can be used for hemodialysis.

## 2022-07-13 NOTE — PROGRESS NOTES
Renal on call       Initial 7/3/22 HPI: 65 Y/O female admited to ER with complint of SOB and diagnosed with COVID-19. Pt reports that she had peripheral angio 5 days ago and noted decreased UOP. Also has past history of COPD , PAD , HTN , Depresion , Hyperlipidemia , RLS,     Chief complaint:   None    Interval History:  Had HD today with new tunneled HD cath w/o issue    ROS:  all else Neg     albuterol  2 puff Inhalation Q6H    ascorbic acid (vitamin C)  500 mg Oral BID    dexamethasone  6 mg Intravenous Daily    epoetin susan-epbx  20,000 Units Subcutaneous Every Mon, Wed, Fri    fluticasone furoate-vilanteroL  1 puff Inhalation Daily    heparin (porcine)  5,000 Units Subcutaneous Q8H    multivitamin  1 tablet Oral Daily    polyethylene glycol  17 g Oral BID    rOPINIRole  1 mg Oral Daily    rOPINIRole  3 mg Oral Daily       VITAL SIGNS: 24 HR MIN & MAX LAST    Temp  Min: 97.7 °F (36.5 °C)  Max: 99 °F (37.2 °C)  98.3 °F (36.8 °C)        BP  Min: 91/57  Max: 103/62  94/61     Pulse  Min: 62  Max: 96  70     Resp  Min: 16  Max: 18  18    SpO2  Min: 92 %  Max: 96 %  96 %      Wt Readings from Last 3 Encounters:   07/07/22 57.6 kg (127 lb)   03/03/21 57.4 kg (126 lb 8.7 oz)       Intake/Output Summary (Last 24 hours) at 7/13/2022 1634  Last data filed at 7/13/2022 1300  Gross per 24 hour   Intake 180 ml   Output 1600 ml   Net -1420 ml     A&O x 4  NAD  1 L Oximask  R CW HD cath  unlabored    Recent Labs     07/11/22  0926 07/13/22  0535    140   K 4.0 3.8   CHLORIDE 107 105   CO2 20* 21*   BUN 64.3* 53.4*   CREATININE 6.22* 4.21*   GLUCOSE 120* 95   CALCIUM 8.5 8.3*   PHOS 4.7 5.2*   ALBUMIN 2.9* 2.8*      No results for input(s): WBC, HGB, HCT, PLT in the last 72 hours.    ASSESSMENT / PLAN    COVID-19    1   ATN / CKD unspec - Had tunneled HD cath placement 7/8/22, became dysfunctional and replaced on 7/12/22 by Dr. Myers. Uncertain prognosis for renal recovery.  Continue HD q MWF, monitor SCr on days  of HD to see if any improvement / renal recovery  2   COPD, COVID-19 - Decadron, nebs, completed 10 days of isolation  3   Anemia - 1 U RBC transfusion on 7/2, EPO 20K U MWF  monitor H/H  4   PAD / H/o  Stents  5   RLS - ropinirole   6   HTN, AF - rate controlled, VSS  7. Constipation - miralax bid    OK to go to LTAC @ [facility]

## 2022-07-13 NOTE — PROGRESS NOTES
07/13/22 1200   Post-Hemodialysis Assessment   Rinseback Volume (mL) 250 mL   Blood Volume Processed (Liters) 40 L   Dialyzer Clearance Moderately streaked   Total UF (mL) 1600 mL   Net Fluid Removal 1100   Patient Response to Treatment tolerated well   Post-Hemodialysis Comments venous chamber clotted with 27min remaining. pt did not want to comlete final 27mins, felt anxious and Restless legs, Dr Wei Anderson.

## 2022-07-13 NOTE — PROGRESS NOTES
"Internal Medicine Progress Note    Subjective/Interval History:  This is a 65 yo female, who presented to the ED with increased sob over past 5 days. PMH COPD, Smoker, PAD, PVD, HTN, Depression, HLD, and RLS. Per family report, patient had angiogram of lower extremity Monday with Dr. Kaplan. Patient was having some SOB when she was at appointment and became worse over past couple of days. Patient was unable to get out of bed as she was so weak. Pt states that she only urinated once in the last 24 hours. Pt attempted to eat this am and had diarrhea. Pt does continue to smoke, however has not smoked in past 3 days. Pt was found to be COVID 19 + and in ARF on admit with a BUN/ CR of 95.6/17.52 respectively and Dr. Cadet has been consulted. Pt also found to be anemic with Hgb of 7.3. She will receive one unit of blood. Patient was vaccinated for COVID.  Pt being admit for SOB s/t COVID19 diagnosis, ARF and FVO.     7-3-22  Pt with some increased SOB during HD  Did urinate x 1 today  Labs essentially unchanged  Remains with productive cough  On 5 liters O2 sating 90%  Issues with her RLS today      7/6 : Today's info : seen and examined, no acute events overnight. Continues to improve   Sob improving  Tolerating hd  h and h low and stable    7/13 Today's info : seen and examined, no acute events overnight. Continues to improve   Post tunnel cath placement  s/p exchange cath placement  h and h better and stable  Right IJ tunneled line catheter insertion with US guidance access and Fluoroscopy   ROS:  Generalized - weakness, poor intake, fatigue,   SOB at rest  Denies abd pain      Vital Signs:  BP 94/61   Pulse 70   Temp 98.3 °F (36.8 °C) (Oral)   Resp 18   Ht 5' 2" (1.575 m)   Wt 57.6 kg (127 lb)   SpO2 96%   Breastfeeding No   BMI 23.23 kg/m²   Body mass index is 23.23 kg/m².    Physical Exam:  Generalized - ill appearing female    HEENT - NC AT [facility]  CVA - S1 S2, IRR  Resp - bilateral diminished, rhonchi, " with mild exp wheeze, use of accessory muscles  Abd- soft, NT ND BS +  EXT - trace BLE edema  Neuro - II - XII Grossly intact  HD Cath to [facility]    Labs:  Recent Results (from the past 48 hour(s))   Renal Function Panel    Collection Time: 07/13/22  5:35 AM   Result Value Ref Range    Sodium Level 140 136 - 145 mmol/L    Potassium Level 3.8 3.5 - 5.1 mmol/L    Chloride 105 98 - 107 mmol/L    Carbon Dioxide 21 (L) 23 - 31 mmol/L    Glucose Level 95 82 - 115 mg/dL    Blood Urea Nitrogen 53.4 (H) 9.8 - 20.1 mg/dL    Creatinine 4.21 (H) 0.55 - 1.02 mg/dL    Calcium Level Total 8.3 (L) 8.4 - 10.2 mg/dL    Albumin Level 2.8 (L) 3.4 - 4.8 gm/dL    Phosphorus Level 5.2 (H) 2.3 - 4.7 mg/dL    Estimated GFR-Non  11 mls/min/1.73/m2   Lactate Dehydrogenase    Collection Time: 07/13/22  9:04 AM   Result Value Ref Range    Lactate Dehydrogenase 253 (H) 125 - 220 U/L         Assessment/Plan:  1. SOB              Titrate oxygen to maintain sat >90%   Duo Nebs   Brio  2. COVID 19 +              Supportive therapy - unable to receive Remdesivir s/t to renal fx    On decadron  3. SYLVESTER              Dr. Rausch - renal consulted -HD today  4. Anemia              Transfusing 1 unit blood              Check stool focb              Iron studies  5. COPD - Smoker  6. FVO  7. A fib              Heparin - 5000 sq q8    Continue aggressive supportive care  Renal and pulm reccs  actemra if worse respi status    dispo :ltac soon  Once tunnel cath placed         Artur Ross MD

## 2022-07-13 NOTE — PLAN OF CARE
Problem: Adult Inpatient Plan of Care  Goal: Plan of Care Review  Outcome: Ongoing, Progressing  Goal: Patient-Specific Goal (Individualized)  Outcome: Ongoing, Progressing  Goal: Absence of Hospital-Acquired Illness or Injury  Outcome: Ongoing, Progressing  Goal: Optimal Comfort and Wellbeing  Outcome: Ongoing, Progressing  Goal: Readiness for Transition of Care  Outcome: Ongoing, Progressing     Problem: Device-Related Complication Risk (Hemodialysis)  Goal: Safe, Effective Therapy Delivery  Outcome: Ongoing, Progressing     Problem: Hemodynamic Instability (Hemodialysis)  Goal: Effective Tissue Perfusion  Outcome: Ongoing, Progressing

## 2022-07-14 VITALS
WEIGHT: 127 LBS | SYSTOLIC BLOOD PRESSURE: 133 MMHG | TEMPERATURE: 98 F | HEIGHT: 62 IN | HEART RATE: 90 BPM | DIASTOLIC BLOOD PRESSURE: 68 MMHG | RESPIRATION RATE: 19 BRPM | BODY MASS INDEX: 23.37 KG/M2 | OXYGEN SATURATION: 95 %

## 2022-07-14 PROBLEM — U07.1 COVID-19: Status: RESOLVED | Noted: 2022-07-02 | Resolved: 2022-07-14

## 2022-07-14 PROCEDURE — 25000003 PHARM REV CODE 250: Performed by: NURSE PRACTITIONER

## 2022-07-14 PROCEDURE — 63600175 PHARM REV CODE 636 W HCPCS

## 2022-07-14 PROCEDURE — 63600175 PHARM REV CODE 636 W HCPCS: Performed by: INTERNAL MEDICINE

## 2022-07-14 PROCEDURE — 27000221 HC OXYGEN, UP TO 24 HOURS

## 2022-07-14 RX ADMIN — ALBUTEROL SULFATE 2 PUFF: 90 AEROSOL, METERED RESPIRATORY (INHALATION) at 12:07

## 2022-07-14 RX ADMIN — FLUTICASONE FUROATE AND VILANTEROL TRIFENATATE 1 PUFF: 100; 25 POWDER RESPIRATORY (INHALATION) at 09:07

## 2022-07-14 RX ADMIN — ALBUTEROL SULFATE 2 PUFF: 90 AEROSOL, METERED RESPIRATORY (INHALATION) at 05:07

## 2022-07-14 RX ADMIN — HEPARIN SODIUM 5000 UNITS: 5000 INJECTION, SOLUTION INTRAVENOUS; SUBCUTANEOUS at 12:07

## 2022-07-14 RX ADMIN — DEXAMETHASONE SODIUM PHOSPHATE 6 MG: 4 INJECTION, SOLUTION INTRA-ARTICULAR; INTRALESIONAL; INTRAMUSCULAR; INTRAVENOUS; SOFT TISSUE at 09:07

## 2022-07-14 RX ADMIN — ROPINIROLE HYDROCHLORIDE 1 MG: 1 TABLET, FILM COATED ORAL at 09:07

## 2022-07-14 RX ADMIN — HEPARIN SODIUM 5000 UNITS: 5000 INJECTION, SOLUTION INTRAVENOUS; SUBCUTANEOUS at 09:07

## 2022-07-14 RX ADMIN — OXYCODONE HYDROCHLORIDE AND ACETAMINOPHEN 500 MG: 500 TABLET ORAL at 09:07

## 2022-07-14 RX ADMIN — MULTIPLE VITAMINS W/ MINERALS TAB 1 TABLET: TAB at 09:07

## 2022-07-14 NOTE — PLAN OF CARE
07/14/22 1425   Final Note   Assessment Type Discharge Planning Assessment   Anticipated Discharge Disposition LTAC  ([facility])   Post-Acute Status   Discharge Delays None known at this time

## 2022-07-16 ENCOUNTER — NURSE TRIAGE (OUTPATIENT)
Dept: ADMINISTRATIVE | Facility: CLINIC | Age: 67
End: 2022-07-16
Payer: MEDICARE

## 2022-07-16 NOTE — TELEPHONE ENCOUNTER
Covid Surveillance program ordered for this pt but pt was discharged to LTAC where she will be receiving 24/7 care so surveillance not appropriate at this time. Removed surveillance tasks.    Reason for Disposition   Patient already left for the hospital/clinic.    Protocols used: NO CONTACT OR DUPLICATE CONTACT CALL-A-AH

## 2022-08-03 ENCOUNTER — HOSPITAL ENCOUNTER (INPATIENT)
Facility: HOSPITAL | Age: 67
LOS: 6 days | Discharge: HOME | DRG: 378 | End: 2022-08-09
Attending: EMERGENCY MEDICINE | Admitting: INTERNAL MEDICINE
Payer: MEDICARE

## 2022-08-03 DIAGNOSIS — R19.5 GUAIAC POSITIVE STOOLS: ICD-10-CM

## 2022-08-03 DIAGNOSIS — J44.9 CHRONIC OBSTRUCTIVE PULMONARY DISEASE, UNSPECIFIED COPD TYPE: ICD-10-CM

## 2022-08-03 DIAGNOSIS — D64.9 SYMPTOMATIC ANEMIA: Primary | ICD-10-CM

## 2022-08-03 DIAGNOSIS — R53.1 WEAKNESS: ICD-10-CM

## 2022-08-03 DIAGNOSIS — Z99.2 DIALYSIS PATIENT: ICD-10-CM

## 2022-08-03 LAB
ALBUMIN SERPL-MCNC: 3.1 GM/DL (ref 3.4–4.8)
ALBUMIN/GLOB SERPL: 1 RATIO (ref 1.1–2)
ALP SERPL-CCNC: 89 UNIT/L (ref 40–150)
ALT SERPL-CCNC: 20 UNIT/L (ref 0–55)
AST SERPL-CCNC: 27 UNIT/L (ref 5–34)
BASOPHILS # BLD AUTO: 0.01 X10(3)/MCL (ref 0–0.2)
BASOPHILS NFR BLD AUTO: 0.1 %
BILIRUBIN DIRECT+TOT PNL SERPL-MCNC: 0.3 MG/DL
BUN SERPL-MCNC: 26.5 MG/DL (ref 9.8–20.1)
CALCIUM SERPL-MCNC: 9.7 MG/DL (ref 8.4–10.2)
CHLORIDE SERPL-SCNC: 99 MMOL/L (ref 98–107)
CO2 SERPL-SCNC: 30 MMOL/L (ref 23–31)
CREAT SERPL-MCNC: 1.58 MG/DL (ref 0.55–1.02)
EOSINOPHIL # BLD AUTO: 0.06 X10(3)/MCL (ref 0–0.9)
EOSINOPHIL NFR BLD AUTO: 0.6 %
ERYTHROCYTE [DISTWIDTH] IN BLOOD BY AUTOMATED COUNT: 19.9 % (ref 11.5–17)
FERRITIN SERPL-MCNC: 37.55 NG/ML (ref 4.63–204)
GLOBULIN SER-MCNC: 3.1 GM/DL (ref 2.4–3.5)
GLUCOSE SERPL-MCNC: 94 MG/DL (ref 82–115)
GROUP & RH: NORMAL
HBV SURFACE AG SERPL QL IA: NONREACTIVE
HCT VFR BLD AUTO: 17.6 % (ref 37–47)
HGB BLD-MCNC: 5.4 GM/DL (ref 12–16)
IMM GRANULOCYTES # BLD AUTO: 0.06 X10(3)/MCL (ref 0–0.04)
IMM GRANULOCYTES NFR BLD AUTO: 0.6 %
INDIRECT COOMBS GEL: NORMAL
IRON SATN MFR SERPL: 9 % (ref 20–50)
IRON SERPL-MCNC: 29 UG/DL (ref 50–170)
LYMPHOCYTES # BLD AUTO: 1.27 X10(3)/MCL (ref 0.6–4.6)
LYMPHOCYTES NFR BLD AUTO: 13.3 %
MCH RBC QN AUTO: 28 PG (ref 27–31)
MCHC RBC AUTO-ENTMCNC: 30.7 MG/DL (ref 33–36)
MCV RBC AUTO: 91.2 FL (ref 80–94)
MONOCYTES # BLD AUTO: 0.79 X10(3)/MCL (ref 0.1–1.3)
MONOCYTES NFR BLD AUTO: 8.3 %
NEUTROPHILS # BLD AUTO: 7.4 X10(3)/MCL (ref 2.1–9.2)
NEUTROPHILS NFR BLD AUTO: 77.1 %
NRBC BLD AUTO-RTO: 0 %
PLATELET # BLD AUTO: 470 X10(3)/MCL (ref 130–400)
PMV BLD AUTO: 9 FL (ref 7.4–10.4)
POTASSIUM SERPL-SCNC: 4.4 MMOL/L (ref 3.5–5.1)
PROT SERPL-MCNC: 6.2 GM/DL (ref 5.8–7.6)
RBC # BLD AUTO: 1.93 X10(6)/MCL (ref 4.2–5.4)
SODIUM SERPL-SCNC: 138 MMOL/L (ref 136–145)
TIBC SERPL-MCNC: 287 UG/DL (ref 70–310)
TIBC SERPL-MCNC: 316 UG/DL (ref 250–450)
WBC # SPEC AUTO: 9.5 X10(3)/MCL (ref 4.5–11.5)

## 2022-08-03 PROCEDURE — 96374 THER/PROPH/DIAG INJ IV PUSH: CPT

## 2022-08-03 PROCEDURE — 85025 COMPLETE CBC W/AUTO DIFF WBC: CPT | Performed by: PHYSICIAN ASSISTANT

## 2022-08-03 PROCEDURE — 86901 BLOOD TYPING SEROLOGIC RH(D): CPT | Performed by: PHYSICIAN ASSISTANT

## 2022-08-03 PROCEDURE — 82728 ASSAY OF FERRITIN: CPT | Performed by: INTERNAL MEDICINE

## 2022-08-03 PROCEDURE — P9016 RBC LEUKOCYTES REDUCED: HCPCS | Performed by: EMERGENCY MEDICINE

## 2022-08-03 PROCEDURE — 11000001 HC ACUTE MED/SURG PRIVATE ROOM

## 2022-08-03 PROCEDURE — 83540 ASSAY OF IRON: CPT | Performed by: INTERNAL MEDICINE

## 2022-08-03 PROCEDURE — 36415 COLL VENOUS BLD VENIPUNCTURE: CPT | Performed by: PHYSICIAN ASSISTANT

## 2022-08-03 PROCEDURE — 36430 TRANSFUSION BLD/BLD COMPNT: CPT

## 2022-08-03 PROCEDURE — 87340 HEPATITIS B SURFACE AG IA: CPT | Performed by: INTERNAL MEDICINE

## 2022-08-03 PROCEDURE — 86704 HEP B CORE ANTIBODY TOTAL: CPT | Performed by: INTERNAL MEDICINE

## 2022-08-03 PROCEDURE — C9113 INJ PANTOPRAZOLE SODIUM, VIA: HCPCS | Performed by: EMERGENCY MEDICINE

## 2022-08-03 PROCEDURE — 84075 ASSAY ALKALINE PHOSPHATASE: CPT | Performed by: PHYSICIAN ASSISTANT

## 2022-08-03 PROCEDURE — 99285 EMERGENCY DEPT VISIT HI MDM: CPT | Mod: 25

## 2022-08-03 PROCEDURE — 93010 EKG 12-LEAD: ICD-10-PCS | Mod: ,,, | Performed by: INTERNAL MEDICINE

## 2022-08-03 PROCEDURE — 25000003 PHARM REV CODE 250: Performed by: NURSE PRACTITIONER

## 2022-08-03 PROCEDURE — 63600175 PHARM REV CODE 636 W HCPCS: Performed by: EMERGENCY MEDICINE

## 2022-08-03 PROCEDURE — 80053 COMPREHEN METABOLIC PANEL: CPT | Performed by: PHYSICIAN ASSISTANT

## 2022-08-03 PROCEDURE — 93005 ELECTROCARDIOGRAM TRACING: CPT

## 2022-08-03 PROCEDURE — 93010 ELECTROCARDIOGRAM REPORT: CPT | Mod: ,,, | Performed by: INTERNAL MEDICINE

## 2022-08-03 PROCEDURE — 86920 COMPATIBILITY TEST SPIN: CPT | Performed by: EMERGENCY MEDICINE

## 2022-08-03 RX ORDER — PANTOPRAZOLE SODIUM 40 MG/10ML
40 INJECTION, POWDER, LYOPHILIZED, FOR SOLUTION INTRAVENOUS
Status: COMPLETED | OUTPATIENT
Start: 2022-08-03 | End: 2022-08-03

## 2022-08-03 RX ORDER — ROPINIROLE 1 MG/1
1 TABLET, FILM COATED ORAL DAILY
Status: DISCONTINUED | OUTPATIENT
Start: 2022-08-03 | End: 2022-08-09 | Stop reason: HOSPADM

## 2022-08-03 RX ORDER — DIPHENHYDRAMINE HCL 25 MG
25 CAPSULE ORAL
Status: DISCONTINUED | OUTPATIENT
Start: 2022-08-03 | End: 2022-08-03

## 2022-08-03 RX ORDER — HYDROCODONE BITARTRATE AND ACETAMINOPHEN 500; 5 MG/1; MG/1
TABLET ORAL
Status: DISCONTINUED | OUTPATIENT
Start: 2022-08-03 | End: 2022-08-09 | Stop reason: HOSPADM

## 2022-08-03 RX ORDER — SODIUM CHLORIDE 0.9 % (FLUSH) 0.9 %
10 SYRINGE (ML) INJECTION
Status: DISCONTINUED | OUTPATIENT
Start: 2022-08-03 | End: 2022-08-09 | Stop reason: HOSPADM

## 2022-08-03 RX ORDER — TALC
6 POWDER (GRAM) TOPICAL NIGHTLY PRN
Status: DISCONTINUED | OUTPATIENT
Start: 2022-08-03 | End: 2022-08-09 | Stop reason: HOSPADM

## 2022-08-03 RX ORDER — ROPINIROLE 1 MG/1
3 TABLET, FILM COATED ORAL NIGHTLY
Status: DISCONTINUED | OUTPATIENT
Start: 2022-08-03 | End: 2022-08-09 | Stop reason: HOSPADM

## 2022-08-03 RX ADMIN — ROPINIROLE HYDROCHLORIDE 3 MG: 1 TABLET, FILM COATED ORAL at 09:08

## 2022-08-03 RX ADMIN — ROPINIROLE HYDROCHLORIDE 1 MG: 1 TABLET, FILM COATED ORAL at 03:08

## 2022-08-03 RX ADMIN — PANTOPRAZOLE SODIUM 40 MG: 40 INJECTION, POWDER, FOR SOLUTION INTRAVENOUS at 02:08

## 2022-08-03 NOTE — FIRST PROVIDER EVALUATION
Medical screening exam completed.  I have conducted a focused provider triage encounter, findings are as follows:    Brief history of present illness:  65 y/o female was sent from dialysis due to low H&H.  Patient reports previous blood transfusion a few weeks ago.      Vitals:    08/03/22 1101   BP: (!) 101/56   Pulse: 95   Resp: 18   Temp: 98.2 °F (36.8 °C)   TempSrc: Oral   SpO2: (!) 94%       Pertinent physical exam:  Patient is ambulating with a walker.  She is on chronic home O2.    Brief workup plan:  Labs, Type and Screen    Preliminary workup initiated; this workup will be continued and followed by the physician or advanced practice provider that is assigned to the patient when roomed.

## 2022-08-03 NOTE — CONSULTS
Gastroenterology Consultation Note    Reason for Consult:  anemia    PCP:   Nohemy Burgess MD    Referring MD: Dr. Alvina Smith      History of Present Illness (HPI):    65 y/o female known to Dr. Aguilar who has a past medical history of ESRD on HD, COPD , PAD , HTN , Depresion , Hyperlipidemia , RLS.    Patient presented to the ED on 8/3/22 with complaints of low H&H at dialysis. She was reportedly transfused blood a few weeks ago, but never saw blood in her stools. She did have a single darker than usual stool this week, but no red blood. She also reports sore throat when swallowing.  On arrival here, she was hemodynamically stable and afebrile. Labs revealed normocytic anemia H&H 5.4/17.6, platelets 470,000, BUN 26.5, Cr 1.58. 2 units pRBC were ordered. GI was consulted.    Patient denies history of GI bleeding. Denies bloody thinners or NSAIDs. Denies EtOH or smoking. Denies abdominal pain, nausea, vomiting, hematemesis, or hematochezia.     Last colonoscopy 2017: mild diverticulosis of the sigmoid. 5 yr recall recommended    No prior upper endoscopies        ROS:  Review of Systems   Constitutional: Negative for fever, malaise/fatigue and weight loss.   Respiratory: Negative for cough and shortness of breath.    Cardiovascular: Negative for chest pain, palpitations and leg swelling.   Gastrointestinal: Negative for abdominal pain, blood in stool, constipation, diarrhea, heartburn, melena, nausea and vomiting.   Musculoskeletal: Negative for back pain and myalgias.   Skin: Negative for rash.   Neurological: Positive for weakness. Negative for speech change and focal weakness.   All other systems reviewed and are negative.      Medical History:   Past Medical History:   Diagnosis Date    COPD (chronic obstructive pulmonary disease)     Depression     HLD (hyperlipidemia)     Hypertension     PAD (peripheral artery disease)     PVD (peripheral vascular disease)     RLS (restless legs syndrome)         Surgical History:   Past Surgical History:   Procedure Laterality Date    INSERTION OF TUNNELED CENTRAL VENOUS HEMODIALYSIS CATHETER N/A 7/8/2022    Procedure: INSERTION, CATHETER, CENTRAL VENOUS, HEMODIALYSIS, TUNNELED;  Surgeon: Benoit Soliman III, MD;  Location: [facility] CATH LAB;  Service: Peripheral Vascular;  Laterality: N/A;  TUNNEL CATH INSERTION    INSERTION OF TUNNELED CENTRAL VENOUS HEMODIALYSIS CATHETER N/A 7/12/2022    Procedure: INSERTION, CATHETER, CENTRAL VENOUS, HEMODIALYSIS, TUNNELED;  Surgeon: Dejan Myers MD;  Location: [facility] CATH LAB;  Service: Peripheral Vascular;  Laterality: N/A;  TUNNELED CATH EXCHANGE       Family History:   No family history on file..     Social History:   Social History     Tobacco Use    Smoking status: Current Every Day Smoker     Types: Cigarettes    Smokeless tobacco: Not on file   Substance Use Topics    Alcohol use: Not on file       Allergies:  Review of patient's allergies indicates:   Allergen Reactions    Oxycodone Hives       (Not in a hospital admission)        Objective Findings:    Vital Signs:  BP (!) 101/56   Pulse 95   Temp 98.2 °F (36.8 °C) (Oral)   Resp 18   SpO2 (!) 94%   There is no height or weight on file to calculate BMI.    Physical Exam:  Physical Exam  Constitutional:       General: She is not in acute distress.  HENT:      Head: Normocephalic and atraumatic.   Eyes:      General: No scleral icterus.     Conjunctiva/sclera: Conjunctivae normal.   Cardiovascular:      Rate and Rhythm: Normal rate and regular rhythm.   Pulmonary:      Effort: Pulmonary effort is normal. No respiratory distress.   Abdominal:      General: Bowel sounds are normal. There is no distension.      Palpations: Abdomen is soft.      Tenderness: There is no abdominal tenderness.   Musculoskeletal:         General: No tenderness. Normal range of motion.   Skin:     General: Skin is warm and dry.   Neurological:      Mental Status: She is alert and oriented to person,  place, and time.   Psychiatric:         Mood and Affect: Mood normal.         Behavior: Behavior normal.         Labs:  Recent Results (from the past 48 hour(s))   Comprehensive metabolic panel    Collection Time: 08/03/22 11:10 AM   Result Value Ref Range    Sodium Level 138 136 - 145 mmol/L    Potassium Level 4.4 3.5 - 5.1 mmol/L    Chloride 99 98 - 107 mmol/L    Carbon Dioxide 30 23 - 31 mmol/L    Glucose Level 94 82 - 115 mg/dL    Blood Urea Nitrogen 26.5 (H) 9.8 - 20.1 mg/dL    Creatinine 1.58 (H) 0.55 - 1.02 mg/dL    Calcium Level Total 9.7 8.4 - 10.2 mg/dL    Protein Total 6.2 5.8 - 7.6 gm/dL    Albumin Level 3.1 (L) 3.4 - 4.8 gm/dL    Globulin 3.1 2.4 - 3.5 gm/dL    Albumin/Globulin Ratio 1.0 (L) 1.1 - 2.0 ratio    Bilirubin Total 0.3 <=1.5 mg/dL    Alkaline Phosphatase 89 40 - 150 unit/L    Alanine Aminotransferase 20 0 - 55 unit/L    Aspartate Aminotransferase 27 5 - 34 unit/L    Estimated GFR-Non  35 mls/min/1.73/m2   CBC with Differential    Collection Time: 08/03/22 11:10 AM   Result Value Ref Range    WBC 9.5 4.5 - 11.5 x10(3)/mcL    RBC 1.93 (L) 4.20 - 5.40 x10(6)/mcL    Hgb 5.4 (LL) 12.0 - 16.0 gm/dL    Hct 17.6 (LL) 37.0 - 47.0 %    MCV 91.2 80.0 - 94.0 fL    MCH 28.0 27.0 - 31.0 pg    MCHC 30.7 (L) 33.0 - 36.0 mg/dL    RDW 19.9 (H) 11.5 - 17.0 %    Platelet 470 (H) 130 - 400 x10(3)/mcL    MPV 9.0 7.4 - 10.4 fL    Neut % 77.1 %    Lymph % 13.3 %    Mono % 8.3 %    Eos % 0.6 %    Basophil % 0.1 %    Lymph # 1.27 0.6 - 4.6 x10(3)/mcL    Neut # 7.4 2.1 - 9.2 x10(3)/mcL    Mono # 0.79 0.1 - 1.3 x10(3)/mcL    Eos # 0.06 0 - 0.9 x10(3)/mcL    Baso # 0.01 0 - 0.2 x10(3)/mcL    IG# 0.06 (H) 0 - 0.04 x10(3)/mcL    IG% 0.6 %    NRBC% 0.0 %   Hepatitis B Surface Antigen    Collection Time: 08/03/22 11:10 AM   Result Value Ref Range    Hepatitis B Surface Antigen Nonreactive Nonreactive   Type & Screen    Collection Time: 08/03/22 11:15 AM   Result Value Ref Range    Group & Rh B POS      Indirect Chip GEL NEG    Prepare RBC 2 Units; symptomatic anemia    Collection Time: 08/03/22 11:15 AM   Result Value Ref Range    UNIT NUMBER [id number]     UNIT ABO/RH B POS     DISPENSE STATUS Selected     Unit Expiration [id number]     Product Code G7140T13     Unit Blood Type Code 7300     CROSSMATCH INTERPRETATION Compatible     UNIT NUMBER [id number]     UNIT ABO/RH B POS     DISPENSE STATUS Selected     Unit Expiration [id number]     Product Code M3115E22     Unit Blood Type Code 7300     CROSSMATCH INTERPRETATION Compatible        No orders to display       Imaging:  No new radiological images to review.        Assessment/Plan:  1. Weakness    2. Symptomatic anemia       67 y/o female known to Dr. Aguilar who has a past medical history of ESRD on HD, COPD , PAD , HTN , Depresion , Hyperlipidemia , RLS. Here with hgb 5 seen at dialysis. No overt bleeding.    1. Normocytic anemia  2. Dark stool  - Monitor stools for color and signs of bleeding  - Monitor H&H; transfuse to keep hgb >7-8  - PPI BID  - Avoid NSAIDs  - Regular diet today  - Keep NPO  - EGD tomorrow after blood today    -NPO after midnight  -EGD tomorrow    Valdemar Coyne PA-C    Thank you for allowing us to participate in the care of Natalia Bower.

## 2022-08-03 NOTE — CONSULTS
[facility] - Emergency Dept  Nephrology  Consult Note    Patient Name: Natalia Bower  MRN: [id number]  Admission Date: 8/3/2022  Hospital Length of Stay: 0 days  Attending Provider: Alvina Cristobal MD   Primary Care Physician: Nohemy Burgess MD  Principal Problem:<principal problem not specified>    Inpatient consult to Nephrology  Consult performed by: VIKRAM Long  Consult ordered by: Alvina Cristobal MD  Reason for consult: ESRD        Subjective:     HPI:  This is a 66-year-old  female who was hospitalized at the end of June/beginning of July with COVID with SYLVESTER necessitating starting hemodialysis.  She was discharged to [facility] and later home.  Patient dialyzes on Monday Wednesday Friday schedule at [facility] via right chest wall tunneled catheter and is followed by Dr. Tate. From talking with the patient it seems as if she has SYLVESTER status still.  Routine labs drawn at dialysis clinic showed anemia for which she was sent to ED. Admit labs showed Hgb 5.4/Hct 17.6.  PMH also significant for COPD on home oxygen, restless legs syndrome, PUD, hypertension, depression, hyperlipidemia.  Nephrology consult for management of ESRD and hemodialysis.    Past Medical History:   Diagnosis Date    COPD (chronic obstructive pulmonary disease)     Depression     HLD (hyperlipidemia)     Hypertension     PAD (peripheral artery disease)     PVD (peripheral vascular disease)     RLS (restless legs syndrome)        Past Surgical History:   Procedure Laterality Date    INSERTION OF TUNNELED CENTRAL VENOUS HEMODIALYSIS CATHETER N/A 7/8/2022    Procedure: INSERTION, CATHETER, CENTRAL VENOUS, HEMODIALYSIS, TUNNELED;  Surgeon: Benoit Soliman III, MD;  Location: [facility] CATH LAB;  Service: Peripheral Vascular;  Laterality: N/A;  TUNNEL CATH INSERTION    INSERTION OF TUNNELED CENTRAL VENOUS HEMODIALYSIS CATHETER N/A 7/12/2022    Procedure: INSERTION, CATHETER, CENTRAL VENOUS,  HEMODIALYSIS, TUNNELED;  Surgeon: Dejan Myers MD;  Location: [facility] CATH LAB;  Service: Peripheral Vascular;  Laterality: N/A;  TUNNELED CATH EXCHANGE       Review of patient's allergies indicates:   Allergen Reactions    Oxycodone Hives     Current Facility-Administered Medications   Medication Frequency    0.9%  NaCl infusion (for blood administration) Q24H PRN    melatonin tablet 6 mg Nightly PRN    rOPINIRole tablet 1 mg Daily    rOPINIRole tablet 3 mg QHS    sodium chloride 0.9% bolus 250 mL PRN    sodium chloride 0.9% flush 10 mL PRN     Current Outpatient Medications   Medication    albuterol (PROVENTIL/VENTOLIN HFA) 90 mcg/actuation inhaler    aspirin (ECOTRIN) 81 MG EC tablet    buPROPion (WELLBUTRIN SR) 150 MG TBSR 12 hr tablet    clopidogreL (PLAVIX) 75 mg tablet    co-enzyme Q-10 30 mg capsule    DECARA 1,250 mcg (50,000 unit) capsule    diazePAM (VALIUM) 5 MG tablet    ELIQUIS 5 mg Tab    EUTHYROX 75 mcg tablet    fluticasone-salmeterol diskus inhaler 250-50 mcg    furosemide (LASIX) 20 MG tablet    lisinopriL (PRINIVIL,ZESTRIL) 20 MG tablet    rOPINIRole (REQUIP) 1 MG tablet    rOPINIRole (REQUIP) 3 MG tablet    rosuvastatin (CRESTOR) 40 MG Tab    SPIRIVA WITH HANDIHALER 18 mcg inhalation capsule    SYMBICORT 160-4.5 mcg/actuation HFAA     Family History    None       Tobacco Use    Smoking status: Current Every Day Smoker     Types: Cigarettes    Smokeless tobacco: Not on file   Substance and Sexual Activity    Alcohol use: Not on file    Drug use: Not on file    Sexual activity: Not on file     Review of Systems   Constitutional: Negative.    HENT: Negative.    Eyes: Negative.    Respiratory: Negative.    Cardiovascular: Negative.    Gastrointestinal: Negative.    Endocrine: Negative.    Genitourinary: Negative.    Musculoskeletal: Negative.    Skin: Negative.    Allergic/Immunologic: Negative.    Neurological: Negative.    Hematological: Negative.     Psychiatric/Behavioral: Negative.      Objective:     Vital Signs (Most Recent):  Temp: 98.2 °F (36.8 °C) (08/03/22 1101)  Pulse: 95 (08/03/22 1101)  Resp: 18 (08/03/22 1101)  BP: (!) 101/56 (08/03/22 1101)  SpO2: (!) 94 % (08/03/22 1101)  O2 Device (Oxygen Therapy): nasal cannula (08/03/22 1101) Vital Signs (24h Range):  Temp:  [98.2 °F (36.8 °C)] 98.2 °F (36.8 °C)  Pulse:  [95] 95  Resp:  [18] 18  SpO2:  [94 %] 94 %  BP: (101)/(56) 101/56        There is no height or weight on file to calculate BMI.  There is no height or weight on file to calculate BSA.    No intake/output data recorded.    Physical Exam  Constitutional:       Appearance: She is ill-appearing.   HENT:      Head: Normocephalic.      Nose: Nose normal.      Mouth/Throat:      Mouth: Mucous membranes are moist.   Eyes:      Extraocular Movements: Extraocular movements intact.   Cardiovascular:      Rate and Rhythm: Normal rate and regular rhythm.      Pulses: Normal pulses.      Heart sounds: Normal heart sounds.   Pulmonary:      Effort: Pulmonary effort is normal.      Comments: CTAB posterior chest, NC at 2L   Abdominal:      General: There is no distension.      Palpations: Abdomen is soft.   Musculoskeletal:         General: Normal range of motion.      Cervical back: Normal range of motion and neck supple.      Comments: 1+ pretibial edema only    Skin:     General: Skin is warm and dry.      Capillary Refill: Capillary refill takes less than 2 seconds.   Neurological:      General: No focal deficit present.      Mental Status: She is alert and oriented to person, place, and time.   Psychiatric:         Mood and Affect: Mood normal.         Behavior: Behavior normal.         Significant Labs:  BMP:   Recent Labs   Lab 08/03/22  1110      K 4.4   CO2 30   BUN 26.5*   CREATININE 1.58*   CALCIUM 9.7     CBC:   Recent Labs   Lab 08/03/22  1110   WBC 9.5   RBC 1.93*   HGB 5.4*   HCT 17.6*   *   MCV 91.2   MCH 28.0   MCHC 30.7*        Assessment/Plan:     1.SYLVESTER-hemodialysis initiated 7/8 for tunneled dialysis catheter placed by Dr. Soliman.  2. Acute on chronic anemia  3. COPD on home oxygen  4. Restless legs syndrome  5. Hypertension  6. CAD history of stenting     -Pt was started on HD as SYLVESTER patient. Cr now 1.58 and she is making urine. Cr was 16 at time of starting HD. We will hold dialysis today and monitor renal function and urine output.   --Ok to give PRBC off of dialysis today   --Check iron indices to evaluate for LUIS  --Check urine protein and creatinine also   --Pt had normal sized kidneys on previous renal US last month at time of dialysis initiation     Thank you for your consult. I will follow-up with patient. Please contact us if you have any additional questions.    VIKRAM Long  Nephrology  [facility] - Emergency Dept

## 2022-08-03 NOTE — ED PROVIDER NOTES
Encounter Date: 8/3/2022       History     Chief Complaint   Patient presents with    Abnormal Lab Results     POV with reported low hemoglobin levels per dialysis nurse. Receives dialysis MWF, reports other than feeling tired has no symptoms. Denies blood in stool or vomiting blood.     65 y/o F presents to the ED after identified as profoundly anemic at dialysis this week. Newly initiated on dialysis during recent admission for COVID-19. [facility] ago but has been more short of breath even since then. She reportedly underwent transfusion of 1 unit PRBCs while in the hospital but had not noted any overt bleeding. No anticoagulant use. Denies noting hematochezia or melena.     The history is provided by the patient and a relative.   General Illness   The current episode started several weeks ago. The problem occurs continuously. The problem has been gradually worsening. Nothing relieves the symptoms. Nothing aggravates the symptoms. Associated symptoms include shortness of breath. Pertinent negatives include no fever, no abdominal pain, no diarrhea, no nausea, no vomiting and no cough.     Review of patient's allergies indicates:   Allergen Reactions    Oxycodone Hives     Past Medical History:   Diagnosis Date    COPD (chronic obstructive pulmonary disease)     Depression     HLD (hyperlipidemia)     Hypertension     PAD (peripheral artery disease)     PVD (peripheral vascular disease)     RLS (restless legs syndrome)      Past Surgical History:   Procedure Laterality Date    INSERTION OF TUNNELED CENTRAL VENOUS HEMODIALYSIS CATHETER N/A 7/8/2022    Procedure: INSERTION, CATHETER, CENTRAL VENOUS, HEMODIALYSIS, TUNNELED;  Surgeon: Benoit Soliman III, MD;  Location: [facility] CATH LAB;  Service: Peripheral Vascular;  Laterality: N/A;  TUNNEL CATH INSERTION    INSERTION OF TUNNELED CENTRAL VENOUS HEMODIALYSIS CATHETER N/A 7/12/2022    Procedure: INSERTION, CATHETER, CENTRAL  [FreeTextEntry1] : Hemodynamically stable with acceptable BP\par No clinical evidence of thyroid pathology\par Neuro exam unremarkable\par No new issues identified\par Lab profiles sent\par Flu vaccine given L deltoid VENOUS, HEMODIALYSIS, TUNNELED;  Surgeon: Dejan Myers MD;  Location: [facility] CATH LAB;  Service: Peripheral Vascular;  Laterality: N/A;  TUNNELED CATH EXCHANGE     No family history on file.  Social History     Tobacco Use    Smoking status: Current Every Day Smoker     Types: Cigarettes     Review of Systems   Constitutional: Positive for fatigue. Negative for chills and fever.   Respiratory: Positive for shortness of breath. Negative for cough.    Cardiovascular: Negative for chest pain and palpitations.   Gastrointestinal: Negative for abdominal pain, diarrhea, nausea and vomiting.   Hematological: Does not bruise/bleed easily.   All other systems reviewed and are negative.      Physical Exam     Initial Vitals [08/03/22 1101]   BP Pulse Resp Temp SpO2   (!) 101/56 95 18 98.2 °F (36.8 °C) (!) 94 %      MAP       --         Physical Exam    Nursing note and vitals reviewed.  Constitutional: She appears well-developed and well-nourished. No distress.   HENT:   Head: Normocephalic and atraumatic.   Mouth/Throat: Oropharynx is clear and moist.   Eyes: Conjunctivae are normal. Pupils are equal, round, and reactive to light. No scleral icterus.   Neck: Neck supple.   Normal range of motion.  Cardiovascular: Normal rate, regular rhythm and intact distal pulses.   Pulmonary/Chest: Breath sounds normal. No respiratory distress.   Right tunneled HD catheter   Abdominal: Abdomen is soft. She exhibits no distension. There is no abdominal tenderness.   Genitourinary: Rectum:      Guaiac result positive (brown stool).   Guaiac positive stool (brown stool). : Acceptable.  Musculoskeletal:         General: No edema.      Cervical back: Normal range of motion and neck supple.     Neurological: She is alert and oriented to person, place, and time.   Skin: Skin is warm and dry. There is pallor.         ED Course   Procedures  Labs Reviewed   COMPREHENSIVE METABOLIC PANEL - Abnormal; Notable for the following  components:       Result Value    Blood Urea Nitrogen 26.5 (*)     Creatinine 1.58 (*)     Albumin Level 3.1 (*)     Albumin/Globulin Ratio 1.0 (*)     All other components within normal limits   CBC WITH DIFFERENTIAL - Abnormal; Notable for the following components:    RBC 1.93 (*)     Hgb 5.4 (*)     Hct 17.6 (*)     MCHC 30.7 (*)     RDW 19.9 (*)     Platelet 470 (*)     IG# 0.06 (*)     All other components within normal limits   HEPATITIS B SURFACE ANTIGEN - Normal   CBC W/ AUTO DIFFERENTIAL    Narrative:     The following orders were created for panel order CBC auto differential.  Procedure                               Abnormality         Status                     ---------                               -----------         ------                     CBC with Differential[[id number]]        Abnormal            Final result                 Please view results for these tests on the individual orders.   HEPATITIS B CORE ANTIBODY, TOTAL   FERRITIN   IRON AND TIBC   TYPE & SCREEN   PREPARE RBC SOFT     EKG Readings: (Independently Interpreted)   Initial Reading: No STEMI. Rhythm: Sinus Arrhythmia. Heart Rate: 97. Ectopy: No Ectopy. Conduction: Normal. ST Segments: Normal ST Segments. T Waves: Normal. Axis: Normal.   08/03/2022 @ 1434         Imaging Results    None          Medications   0.9%  NaCl infusion (for blood administration) (has no administration in time range)   sodium chloride 0.9% flush 10 mL (has no administration in time range)   melatonin tablet 6 mg (has no administration in time range)   rOPINIRole tablet 1 mg (has no administration in time range)   rOPINIRole tablet 3 mg (has no administration in time range)   sodium chloride 0.9% bolus 250 mL (has no administration in time range)   pantoprazole injection 40 mg (40 mg Intravenous Given 8/3/22 1400)     Medical Decision Making:   Initial Assessment:   Ms. Bower presented for evaluation of severe anemia noted on outpatient labs. Symptomatic  with fatigue and exertional dyspnea. Will confirm anemia on labs here. Guaiac + stools. Will give protonix, likely to need admission for transfusion, further anemia work up.   Differential Diagnosis:   CKD, anemia of chronic disease, occult GI bleed, nutritional anemia  Independently Interpreted Test(s):   I have ordered and independently interpreted EKG Reading(s) - see prior notes  Clinical Tests:   Lab Tests: Ordered and Reviewed       <> Summary of Lab: Severe anemia, CKD  Medical Tests: Ordered and Reviewed  ED Management:  Anemia confirmed. R/B/A transfusion discussed and patient agreeable to proceed. Will admit to Dr. Ross, transfusion ordered along w/ nephrology consultation to continue HD treatments, GI evaluation to further investigate severe anemia w/ guaiac + stools.  Findings and plan discussed with the patient and she is agreeable to admission at this time.                         Clinical Impression:   Final diagnoses:  [R53.1] Weakness  [D64.9] Symptomatic anemia (Primary)  [R19.5] Guaiac positive stools  [Z99.2] Dialysis patient          ED Disposition Condition    Admit               Alvina Cristobal MD  08/03/22 2099

## 2022-08-04 ENCOUNTER — ANESTHESIA EVENT (OUTPATIENT)
Dept: ENDOSCOPY | Facility: HOSPITAL | Age: 67
DRG: 378 | End: 2022-08-04
Payer: MEDICARE

## 2022-08-04 ENCOUNTER — ANESTHESIA (OUTPATIENT)
Dept: ENDOSCOPY | Facility: HOSPITAL | Age: 67
DRG: 378 | End: 2022-08-04
Payer: MEDICARE

## 2022-08-04 LAB
ABO + RH BLD: NORMAL
ABO + RH BLD: NORMAL
BLD PROD TYP BPU: NORMAL
BLD PROD TYP BPU: NORMAL
BLOOD UNIT EXPIRATION DATE: NORMAL
BLOOD UNIT EXPIRATION DATE: NORMAL
BLOOD UNIT TYPE CODE: 7300
BLOOD UNIT TYPE CODE: 7300
CROSSMATCH INTERPRETATION: NORMAL
CROSSMATCH INTERPRETATION: NORMAL
DISPENSE STATUS: NORMAL
DISPENSE STATUS: NORMAL
HBV CORE AB SERPL QL IA: NONREACTIVE
UNIT NUMBER: NORMAL
UNIT NUMBER: NORMAL

## 2022-08-04 PROCEDURE — 25000003 PHARM REV CODE 250: Performed by: INTERNAL MEDICINE

## 2022-08-04 PROCEDURE — 87040 BLOOD CULTURE FOR BACTERIA: CPT | Performed by: NURSE PRACTITIONER

## 2022-08-04 PROCEDURE — 94761 N-INVAS EAR/PLS OXIMETRY MLT: CPT

## 2022-08-04 PROCEDURE — 43235 EGD DIAGNOSTIC BRUSH WASH: CPT | Performed by: INTERNAL MEDICINE

## 2022-08-04 PROCEDURE — 25000003 PHARM REV CODE 250: Performed by: NURSE ANESTHETIST, CERTIFIED REGISTERED

## 2022-08-04 PROCEDURE — 27000221 HC OXYGEN, UP TO 24 HOURS

## 2022-08-04 PROCEDURE — 86431 RHEUMATOID FACTOR QUANT: CPT | Performed by: INTERNAL MEDICINE

## 2022-08-04 PROCEDURE — 11000001 HC ACUTE MED/SURG PRIVATE ROOM

## 2022-08-04 PROCEDURE — P9016 RBC LEUKOCYTES REDUCED: HCPCS | Performed by: EMERGENCY MEDICINE

## 2022-08-04 PROCEDURE — 63600175 PHARM REV CODE 636 W HCPCS: Performed by: NURSE ANESTHETIST, CERTIFIED REGISTERED

## 2022-08-04 PROCEDURE — 86200 CCP ANTIBODY: CPT | Performed by: INTERNAL MEDICINE

## 2022-08-04 PROCEDURE — 37000008 HC ANESTHESIA 1ST 15 MINUTES: Performed by: INTERNAL MEDICINE

## 2022-08-04 PROCEDURE — 36415 COLL VENOUS BLD VENIPUNCTURE: CPT | Performed by: INTERNAL MEDICINE

## 2022-08-04 RX ORDER — PROPOFOL 10 MG/ML
VIAL (ML) INTRAVENOUS
Status: COMPLETED
Start: 2022-08-04 | End: 2022-08-04

## 2022-08-04 RX ORDER — BUPROPION HYDROCHLORIDE 150 MG/1
150 TABLET, EXTENDED RELEASE ORAL 2 TIMES DAILY
Status: DISCONTINUED | OUTPATIENT
Start: 2022-08-04 | End: 2022-08-09 | Stop reason: HOSPADM

## 2022-08-04 RX ORDER — DIAZEPAM 2 MG/1
2 TABLET ORAL EVERY 6 HOURS PRN
Status: DISCONTINUED | OUTPATIENT
Start: 2022-08-04 | End: 2022-08-09 | Stop reason: HOSPADM

## 2022-08-04 RX ORDER — FUROSEMIDE 20 MG/1
20 TABLET ORAL DAILY
Status: DISCONTINUED | OUTPATIENT
Start: 2022-08-04 | End: 2022-08-09 | Stop reason: HOSPADM

## 2022-08-04 RX ORDER — SODIUM, POTASSIUM,MAG SULFATES 17.5-3.13G
1 SOLUTION, RECONSTITUTED, ORAL ORAL 2 TIMES DAILY
Status: COMPLETED | OUTPATIENT
Start: 2022-08-04 | End: 2022-08-05

## 2022-08-04 RX ORDER — LISINOPRIL 20 MG/1
20 TABLET ORAL DAILY
Status: DISCONTINUED | OUTPATIENT
Start: 2022-08-04 | End: 2022-08-09 | Stop reason: HOSPADM

## 2022-08-04 RX ORDER — PROPOFOL 10 MG/ML
VIAL (ML) INTRAVENOUS CONTINUOUS PRN
Status: DISCONTINUED | OUTPATIENT
Start: 2022-08-04 | End: 2022-08-04

## 2022-08-04 RX ORDER — ALBUTEROL SULFATE 90 UG/1
1 AEROSOL, METERED RESPIRATORY (INHALATION) EVERY 4 HOURS PRN
Status: DISCONTINUED | OUTPATIENT
Start: 2022-08-04 | End: 2022-08-09 | Stop reason: HOSPADM

## 2022-08-04 RX ADMIN — PROPOFOL 150 MCG/KG/MIN: 10 INJECTION, EMULSION INTRAVENOUS at 01:08

## 2022-08-04 RX ADMIN — BUPROPION HYDROCHLORIDE 150 MG: 150 TABLET, EXTENDED RELEASE ORAL at 08:08

## 2022-08-04 RX ADMIN — ROPINIROLE HYDROCHLORIDE 3 MG: 1 TABLET, FILM COATED ORAL at 08:08

## 2022-08-04 RX ADMIN — SODIUM SULFATE, POTASSIUM SULFATE, MAGNESIUM SULFATE 354 ML: 17.5; 3.13; 1.6 SOLUTION, CONCENTRATE ORAL at 06:08

## 2022-08-04 RX ADMIN — LISINOPRIL 20 MG: 20 TABLET ORAL at 05:08

## 2022-08-04 RX ADMIN — SODIUM CHLORIDE: 9 INJECTION, SOLUTION INTRAVENOUS at 01:08

## 2022-08-04 RX ADMIN — FUROSEMIDE 20 MG: 20 TABLET ORAL at 05:08

## 2022-08-04 NOTE — ANESTHESIA POSTPROCEDURE EVALUATION
Anesthesia Post Evaluation    Patient: Natalia Bower    Procedure(s) Performed: Procedure(s) (LRB):  EGD (N/A)    Final Anesthesia Type: general      Patient location during evaluation: PACU  Patient participation: Yes- Able to Participate  Level of consciousness: awake and alert  Post-procedure vital signs: reviewed and stable  Pain management: adequate  Airway patency: patent    PONV status at discharge: No PONV  Anesthetic complications: no      Respiratory status: unassisted  Hydration status: euvolemic  Follow-up not needed.          Vitals Value Taken Time   /64 08/04/22 1416   Temp 37.5 °C (99.5 °F) 08/04/22 1346   Pulse 78 08/04/22 1416   Resp 18 08/04/22 1416   SpO2 100 % 08/04/22 1416         No case tracking events are documented in the log.      Pain/Pricilla Score: Pricilla Score: 9 (8/4/2022  2:18 PM)

## 2022-08-04 NOTE — PROVATION PATIENT INSTRUCTIONS
Discharge Summary/Instructions after an Endoscopic Procedure  Patient Name: Natalia Bower  Patient MRN: [id number]  Patient YOB: 1955 Thursday, August 4, 2022  Alex Persaud MD  Dear patient,  As a result of recent federal legislation (The Federal Cures Act), you may   receive lab or pathology results from your procedure in your [facility]   account before your physician is able to contact you. Your physician or   their representative will relay the results to you with their   recommendations at their soonest availability.  Thank you,  RESTRICTIONS:  During your procedure today, you received medications for sedation.  These   medications may affect your judgment, balance and coordination.  Therefore,   for 24 hours, you have the following restrictions:   - DO NOT drive a car, operate machinery, make legal/financial decisions,   sign important papers or drink alcohol.    ACTIVITY:  Today: no heavy lifting, straining or running due to procedural   sedation/anesthesia.  The following day: return to full activity including work.  DIET:  Eat and drink normally unless instructed otherwise.     TREATMENT FOR COMMON SIDE EFFECTS:  - Mild abdominal pain, nausea, belching, bloating or excessive gas:  rest,   eat lightly and use a heating pad.  - Sore Throat: treat with throat lozenges and/or gargle with warm salt   water.  - Because air was used during the procedure, expelling large amounts of air   from your rectum or belching is normal.  - If a bowel prep was taken, you may not have a bowel movement for 1-3 days.    This is normal.  SYMPTOMS TO WATCH FOR AND REPORT TO YOUR PHYSICIAN:  1. Abdominal pain or bloating, other than gas cramps.  2. Chest pain.  3. Back pain.  4. Signs of infection such as: chills or fever occurring within 24 hours   after the procedure.  5. Rectal bleeding, which would show as bright red, maroon, or black stools.   (A tablespoon of blood from the rectum is not serious, especially  if   hemorrhoids are present.)  6. Vomiting.  7. Weakness or dizziness.  GO DIRECTLY TO THE NEAREST EMERGENCY ROOM IF YOU HAVE ANY OF THE FOLLOWING:      Difficulty breathing              Chills and/or fever over 101 F   Persistent vomiting and/or vomiting blood   Severe abdominal pain   Severe chest pain   Black, tarry stools   Bleeding- more than one tablespoon   Any other symptom or condition that you feel may need urgent attention  Your doctor recommends these additional instructions:  If any biopsies were taken, your doctors clinic will contact you in 1 to 2   weeks with any results.  Plan for colonoscopy and if negative VCE tomorrow  clear liquids and colon prep  For questions, problems or results please call your physician - Alex Persaud MD at Work:  [phone].  [facility], EMERGENCY ROOM PHONE NUMBER: [phone]  IF A COMPLICATION OR EMERGENCY SITUATION ARISES AND YOU ARE UNABLE TO REACH   YOUR PHYSICIAN - GO DIRECTLY TO THE EMERGENCY ROOM.  MD Alex Tamayo MD  8/4/2022 1:50:30 PM  This report has been verified and signed electronically.  Dear patient,  As a result of recent federal legislation (The Federal Cures Act), you may   receive lab or pathology results from your procedure in your [facility]   account before your physician is able to contact you. Your physician or   their representative will relay the results to you with their   recommendations at their soonest availability.  Thank you,  PROVATION

## 2022-08-04 NOTE — ANESTHESIA PREPROCEDURE EVALUATION
"                                                                                                             08/04/2022  Natalia Bower is a 66 y.o., female.  "65 yo female well known to me with PMH COPD, Smoker, PAD, PVD, HTN, Depression, HLD, and RLS with recent hospital admission for covid and renal failure ended up on hd then to ltac and dced home recently presented to the ed with complaints of worsening weakness and sob and further work up suggestive of severe symptomatic anemia and further admitted for blood transfusions and hd with renal consult for gi consult for potential gi bleed."    Pre-op Assessment    I have reviewed the Patient Summary Reports.     I have reviewed the Nursing Notes. I have reviewed the NPO Status.   I have reviewed the Medications.     Review of Systems  Anesthesia Hx:   Denies Personal Hx of Anesthesia complications.   Hematology/Oncology:         -- Anemia:   Cardiovascular:   Hypertension Denies MI.   Denies CABG/stent.     Pulmonary:   COPD, mild    Renal/:   Chronic Renal Disease (MWF schedule -- no HD yesterday per nephrology, no acute indication), ESRD    Neurological:  Neurology Normal    Endocrine:  Endocrine Normal    Psych:   depression          Physical Exam  General: Alert and Cooperative    Airway:  Mallampati: II   Mouth Opening: Normal  TM Distance: Normal  Tongue: Normal  Neck ROM: Normal ROM        Anesthesia Plan  Type of Anesthesia, risks & benefits discussed:    Anesthesia Type: Gen Natural Airway  Intra-op Monitoring Plan: Standard ASA Monitors  Induction:  IV  Informed Consent: Patient consented to blood products? Yes  ASA Score: 3  Anesthesia Plan Notes: ESRD (MWF HD), COPD, HTN, here for severe symptomatic anemia necessitating EGD.  H/H 5.5/17 on arrival -- received 2u pRBCs.  Repeat CBC pending.  VSS.  On exam, appears comfortable, no distress.    Drea SILVEIRA     Ready For Surgery From Anesthesia Perspective.     .      "

## 2022-08-04 NOTE — H&P
[facility] - [facility] Tele[facility]    History & Physical      Patient Name: Natalia Bower  MRN: [id number]  Admission Date: 8/3/2022  Attending Physician: Artur Ross MD   Primary Care Provider: Nohemy Burgess MD         Patient information was obtained from patient and ER records.     Subjective:     Principal Problem:<principal problem not specified>    Chief Complaint:   Chief Complaint   Patient presents with    Abnormal Lab Results     POV with reported low hemoglobin levels per dialysis nurse. Receives dialysis MWF, reports other than feeling tired has no symptoms. Denies blood in stool or vomiting blood.        HPI: 65 yo female well known to me with PMH COPD, Smoker, PAD, PVD, HTN, Depression, HLD, and RLS with recent hospital admission for covid and renal failure ended up on hd then to ltac and dced home recently presented to the ed with complaints of worsening weakness and sob and further work up suggestive of severe symptomatic anemia and further admitted for blood transfusions and hd with renal consult for gi consult for potential gi bleed    Past Medical History:   Diagnosis Date    COPD (chronic obstructive pulmonary disease)     Depression     HLD (hyperlipidemia)     Hypertension     PAD (peripheral artery disease)     PVD (peripheral vascular disease)     RLS (restless legs syndrome)        Past Surgical History:   Procedure Laterality Date    INSERTION OF TUNNELED CENTRAL VENOUS HEMODIALYSIS CATHETER N/A 7/8/2022    Procedure: INSERTION, CATHETER, CENTRAL VENOUS, HEMODIALYSIS, TUNNELED;  Surgeon: Benoit Soliman III, MD;  Location: [facility] CATH LAB;  Service: Peripheral Vascular;  Laterality: N/A;  TUNNEL CATH INSERTION    INSERTION OF TUNNELED CENTRAL VENOUS HEMODIALYSIS CATHETER N/A 7/12/2022    Procedure: INSERTION, CATHETER, CENTRAL VENOUS, HEMODIALYSIS, TUNNELED;  Surgeon: Dejan Myers MD;  Location: [facility] CATH LAB;  Service: Peripheral Vascular;   Laterality: N/A;  TUNNELED CATH EXCHANGE       Review of patient's allergies indicates:   Allergen Reactions    Oxycodone Hives       No current facility-administered medications on file prior to encounter.     Current Outpatient Medications on File Prior to Encounter   Medication Sig    albuterol (PROVENTIL/VENTOLIN HFA) 90 mcg/actuation inhaler SMARTSI Puff(s) By Mouth Every 4 Hours PRN    aspirin (ECOTRIN) 81 MG EC tablet Take 81 mg by mouth.    buPROPion (WELLBUTRIN SR) 150 MG TBSR 12 hr tablet Take 150 mg by mouth 2 (two) times daily.    clopidogreL (PLAVIX) 75 mg tablet Take 75 mg by mouth once daily.    co-enzyme Q-10 30 mg capsule Take 100 mg by mouth once daily.    DECARA 1,250 mcg (50,000 unit) capsule Take 50,000 Units by mouth every 7 days.    diazePAM (VALIUM) 5 MG tablet SMARTSI-2 Tablet(s) By Mouth PRN    ELIQUIS 5 mg Tab Take 5 mg by mouth 2 (two) times daily.    EUTHYROX 75 mcg tablet Take 75 mcg by mouth once daily.    fluticasone-salmeterol diskus inhaler 250-50 mcg Inhale 1 puff into the lungs.    furosemide (LASIX) 20 MG tablet Take by mouth once daily. As needed for edema    lisinopriL (PRINIVIL,ZESTRIL) 20 MG tablet Take 20 mg by mouth once daily.    rOPINIRole (REQUIP) 1 MG tablet Take 1 mg by mouth once daily.    rOPINIRole (REQUIP) 3 MG tablet Take 3 mg by mouth once daily.    rosuvastatin (CRESTOR) 40 MG Tab Take 40 mg by mouth nightly.    SPIRIVA WITH HANDIHALER 18 mcg inhalation capsule 1 capsule once daily.    SYMBICORT 160-4.5 mcg/actuation HFAA SMARTSI Puff(s) By Mouth Twice Daily    [DISCONTINUED] amLODIPine (NORVASC) 5 MG tablet Take 5 mg by mouth once daily.     Family History    None       Tobacco Use    Smoking status: Current Every Day Smoker     Types: Cigarettes    Smokeless tobacco: Not on file   Substance and Sexual Activity    Alcohol use: Not on file    Drug use: Not on file    Sexual activity: Not on file     Review of Systems    Constitutional: Positive for fatigue.   HENT: Negative.    Eyes: Negative.    Respiratory: Positive for shortness of breath.    Cardiovascular: Positive for palpitations.   Gastrointestinal: Negative.    Endocrine: Negative.    Genitourinary: Negative.    Musculoskeletal: Negative.    Skin: Negative.    Allergic/Immunologic: Negative.    Neurological: Negative.    Hematological: Negative.    Psychiatric/Behavioral: The patient is nervous/anxious.      Objective:     Vital Signs (Most Recent):  Temp: 97.9 °F (36.6 °C) (08/04/22 0759)  Pulse: 84 (08/04/22 0759)  Resp: 18 (08/04/22 0759)  BP: 138/62 (08/04/22 0759)  SpO2: (!) 91 % (08/04/22 0759) Vital Signs (24h Range):  Temp:  [97.4 °F (36.3 °C)-98.2 °F (36.8 °C)] 97.9 °F (36.6 °C)  Pulse:  [75-95] 84  Resp:  [18-20] 18  SpO2:  [91 %-100 %] 91 %  BP: (101-138)/(51-74) 138/62        There is no height or weight on file to calculate BMI.    Physical Exam  Vitals reviewed.   Constitutional:       Appearance: Normal appearance.   HENT:      Head: Normocephalic and atraumatic.      Right Ear: Tympanic membrane and external ear normal.      Nose: Nose normal.      Mouth/Throat:      Mouth: Mucous membranes are moist.   Eyes:      Extraocular Movements: Extraocular movements intact.      Pupils: Pupils are equal, round, and reactive to light.   Cardiovascular:      Rate and Rhythm: Normal rate and regular rhythm.      Pulses: Normal pulses.      Heart sounds: Normal heart sounds.   Pulmonary:      Effort: Pulmonary effort is normal.      Breath sounds: Normal breath sounds.   Abdominal:      General: Abdomen is flat. Bowel sounds are normal.      Palpations: Abdomen is soft.   Musculoskeletal:         General: Normal range of motion.      Cervical back: Normal range of motion and neck supple.   Skin:     General: Skin is warm and dry.   Neurological:      General: No focal deficit present.      Mental Status: She is alert and oriented to person, place, and time.    Psychiatric:         Mood and Affect: Mood normal.         Behavior: Behavior normal.           CRANIAL NERVES     CN III, IV, VI   Pupils are equal, round, and reactive to light.      Significant Labs: All pertinent labs within the past 24 hours have been reviewed.  Lab Results   Component Value Date    WBC 9.5 08/03/2022    HGB 5.4 (LL) 08/03/2022    HCT 17.6 (LL) 08/03/2022    MCV 91.2 08/03/2022     (H) 08/03/2022       Recent Labs   Lab 08/03/22  1110      K 4.4   CO2 30   BUN 26.5*   CREATININE 1.58*   GLUCOSE 94   CALCIUM 9.7       Significant Imaging: I have reviewed all pertinent imaging results/findings within the past 24 hours.    Assessment/Plan:     There are no hospital problems to display for this patient.    VTE Risk Mitigation (From admission, onward)         Ordered     IP VTE HIGH RISK PATIENT  Once         08/03/22 1445     Place sequential compression device  Until discontinued         08/03/22 1445              Severe symptomatic anemia  Potential gi bleed  htn  hld  Recent covid pneumonia  Copd  esrd on hd  rld  Anxiety  Pad  rls  afib    Plan :  Transfuse as needed  abdelrahman plavix and eliquis  Hd per renal  Iv protonix  Gi consult  Resume home meds  Labs in am  Gi and dvt ppx  Code status full    Artur Ross MD  Department of Hospital Medicine   [facility] - [facility] Tele[facility]

## 2022-08-04 NOTE — TRANSFER OF CARE
"Anesthesia Transfer of Care Note    Patient: Natalia Bower    Procedure(s) Performed: Procedure(s) (LRB):  EGD (N/A)    Patient location: GI    Anesthesia Type: general    Transport from OR: Transported from OR on room air with adequate spontaneous ventilation    Post pain: adequate analgesia    Post assessment: no apparent anesthetic complications    Post vital signs: stable    Level of consciousness: awake    Nausea/Vomiting: no nausea/vomiting    Complications: none    Transfer of care protocol was followed      Last vitals:   Visit Vitals  BP (!) 117/58   Pulse 80   Temp 36 °C (96.8 °F)   Resp 12   Ht 5' 2.5" (1.588 m)   Wt 53.5 kg (118 lb)   SpO2 99%   Breastfeeding No   BMI 21.24 kg/m²     "

## 2022-08-05 ENCOUNTER — ANESTHESIA EVENT (OUTPATIENT)
Dept: ENDOSCOPY | Facility: HOSPITAL | Age: 67
DRG: 378 | End: 2022-08-05
Payer: MEDICARE

## 2022-08-05 ENCOUNTER — ANESTHESIA (OUTPATIENT)
Dept: ENDOSCOPY | Facility: HOSPITAL | Age: 67
DRG: 378 | End: 2022-08-05
Payer: MEDICARE

## 2022-08-05 LAB
ALBUMIN SERPL-MCNC: 3.1 GM/DL (ref 3.4–4.8)
ALBUMIN/GLOB SERPL: 1 RATIO (ref 1.1–2)
ALP SERPL-CCNC: 88 UNIT/L (ref 40–150)
ALT SERPL-CCNC: 21 UNIT/L (ref 0–55)
AST SERPL-CCNC: 24 UNIT/L (ref 5–34)
BASOPHILS # BLD AUTO: 0.02 X10(3)/MCL (ref 0–0.2)
BASOPHILS NFR BLD AUTO: 0.3 %
BILIRUBIN DIRECT+TOT PNL SERPL-MCNC: 0.4 MG/DL
BUN SERPL-MCNC: 18.6 MG/DL (ref 9.8–20.1)
CALCIUM SERPL-MCNC: 9.2 MG/DL (ref 8.4–10.2)
CHLORIDE SERPL-SCNC: 103 MMOL/L (ref 98–107)
CO2 SERPL-SCNC: 31 MMOL/L (ref 23–31)
CREAT SERPL-MCNC: 1.27 MG/DL (ref 0.55–1.02)
EOSINOPHIL # BLD AUTO: 0.11 X10(3)/MCL (ref 0–0.9)
EOSINOPHIL NFR BLD AUTO: 1.8 %
ERYTHROCYTE [DISTWIDTH] IN BLOOD BY AUTOMATED COUNT: 18.5 % (ref 11.5–17)
GLOBULIN SER-MCNC: 3.2 GM/DL (ref 2.4–3.5)
GLUCOSE SERPL-MCNC: 88 MG/DL (ref 82–115)
HCT VFR BLD AUTO: 27.1 % (ref 37–47)
HCT VFR BLD AUTO: 27.2 % (ref 37–47)
HGB BLD-MCNC: 8.7 GM/DL (ref 12–16)
HGB BLD-MCNC: 8.7 GM/DL (ref 12–16)
IMM GRANULOCYTES # BLD AUTO: 0.03 X10(3)/MCL (ref 0–0.04)
IMM GRANULOCYTES NFR BLD AUTO: 0.5 %
LYMPHOCYTES # BLD AUTO: 1.28 X10(3)/MCL (ref 0.6–4.6)
LYMPHOCYTES NFR BLD AUTO: 20.6 %
MCH RBC QN AUTO: 28.7 PG (ref 27–31)
MCHC RBC AUTO-ENTMCNC: 32 MG/DL (ref 33–36)
MCV RBC AUTO: 89.8 FL (ref 80–94)
MONOCYTES # BLD AUTO: 0.64 X10(3)/MCL (ref 0.1–1.3)
MONOCYTES NFR BLD AUTO: 10.3 %
NEUTROPHILS # BLD AUTO: 4.1 X10(3)/MCL (ref 2.1–9.2)
NEUTROPHILS NFR BLD AUTO: 66.5 %
NRBC BLD AUTO-RTO: 0 %
PLATELET # BLD AUTO: 404 X10(3)/MCL (ref 130–400)
PMV BLD AUTO: 8.7 FL (ref 7.4–10.4)
POTASSIUM SERPL-SCNC: 3.9 MMOL/L (ref 3.5–5.1)
PROT SERPL-MCNC: 6.3 GM/DL (ref 5.8–7.6)
RBC # BLD AUTO: 3.03 X10(6)/MCL (ref 4.2–5.4)
SODIUM SERPL-SCNC: 142 MMOL/L (ref 136–145)
WBC # SPEC AUTO: 6.2 X10(3)/MCL (ref 4.5–11.5)

## 2022-08-05 PROCEDURE — 91110 GI TRC IMG INTRAL ESOPH-ILE: CPT | Performed by: INTERNAL MEDICINE

## 2022-08-05 PROCEDURE — 25000003 PHARM REV CODE 250: Performed by: INTERNAL MEDICINE

## 2022-08-05 PROCEDURE — 11000001 HC ACUTE MED/SURG PRIVATE ROOM

## 2022-08-05 PROCEDURE — 27201423 OPTIME MED/SURG SUP & DEVICES STERILE SUPPLY: Performed by: INTERNAL MEDICINE

## 2022-08-05 PROCEDURE — 25000003 PHARM REV CODE 250: Performed by: SURGERY

## 2022-08-05 PROCEDURE — 45380 COLONOSCOPY AND BIOPSY: CPT | Performed by: INTERNAL MEDICINE

## 2022-08-05 PROCEDURE — 37000008 HC ANESTHESIA 1ST 15 MINUTES: Performed by: INTERNAL MEDICINE

## 2022-08-05 PROCEDURE — 37000009 HC ANESTHESIA EA ADD 15 MINS: Performed by: INTERNAL MEDICINE

## 2022-08-05 PROCEDURE — 85025 COMPLETE CBC W/AUTO DIFF WBC: CPT | Performed by: INTERNAL MEDICINE

## 2022-08-05 PROCEDURE — 25000003 PHARM REV CODE 250: Performed by: NURSE ANESTHETIST, CERTIFIED REGISTERED

## 2022-08-05 PROCEDURE — 63600175 PHARM REV CODE 636 W HCPCS: Mod: JG | Performed by: INTERNAL MEDICINE

## 2022-08-05 PROCEDURE — 85014 HEMATOCRIT: CPT | Performed by: INTERNAL MEDICINE

## 2022-08-05 PROCEDURE — 63600175 PHARM REV CODE 636 W HCPCS: Performed by: NURSE ANESTHETIST, CERTIFIED REGISTERED

## 2022-08-05 PROCEDURE — 43251 EGD REMOVE LESION SNARE: CPT | Performed by: INTERNAL MEDICINE

## 2022-08-05 PROCEDURE — 36415 COLL VENOUS BLD VENIPUNCTURE: CPT | Performed by: INTERNAL MEDICINE

## 2022-08-05 PROCEDURE — 80053 COMPREHEN METABOLIC PANEL: CPT | Performed by: INTERNAL MEDICINE

## 2022-08-05 RX ORDER — LIDOCAINE HYDROCHLORIDE 10 MG/ML
1 INJECTION, SOLUTION EPIDURAL; INFILTRATION; INTRACAUDAL; PERINEURAL ONCE
Status: DISCONTINUED | OUTPATIENT
Start: 2022-08-05 | End: 2022-08-05

## 2022-08-05 RX ORDER — SODIUM CHLORIDE, SODIUM GLUCONATE, SODIUM ACETATE, POTASSIUM CHLORIDE AND MAGNESIUM CHLORIDE 30; 37; 368; 526; 502 MG/100ML; MG/100ML; MG/100ML; MG/100ML; MG/100ML
1000 INJECTION, SOLUTION INTRAVENOUS CONTINUOUS
Status: DISCONTINUED | OUTPATIENT
Start: 2022-08-05 | End: 2022-08-09 | Stop reason: HOSPADM

## 2022-08-05 RX ORDER — LIDOCAINE HYDROCHLORIDE 20 MG/ML
100 INJECTION, SOLUTION EPIDURAL; INFILTRATION; INTRACAUDAL; PERINEURAL ONCE
Status: COMPLETED | OUTPATIENT
Start: 2022-08-05 | End: 2022-08-05

## 2022-08-05 RX ORDER — PROPOFOL 10 MG/ML
VIAL (ML) INTRAVENOUS
Status: DISCONTINUED | OUTPATIENT
Start: 2022-08-05 | End: 2022-08-05

## 2022-08-05 RX ORDER — LIDOCAINE HYDROCHLORIDE 20 MG/ML
10 INJECTION, SOLUTION EPIDURAL; INFILTRATION; INTRACAUDAL; PERINEURAL ONCE
Status: DISCONTINUED | OUTPATIENT
Start: 2022-08-05 | End: 2022-08-09 | Stop reason: HOSPADM

## 2022-08-05 RX ORDER — ONDANSETRON 2 MG/ML
4 INJECTION INTRAMUSCULAR; INTRAVENOUS DAILY PRN
Status: DISCONTINUED | OUTPATIENT
Start: 2022-08-05 | End: 2022-08-09 | Stop reason: HOSPADM

## 2022-08-05 RX ORDER — LIDOCAINE HYDROCHLORIDE 20 MG/ML
10 INJECTION, SOLUTION INFILTRATION; PERINEURAL ONCE
Status: DISCONTINUED | OUTPATIENT
Start: 2022-08-05 | End: 2022-08-05

## 2022-08-05 RX ORDER — PROPOFOL 10 MG/ML
VIAL (ML) INTRAVENOUS
Status: COMPLETED
Start: 2022-08-05 | End: 2022-08-05

## 2022-08-05 RX ORDER — SODIUM CHLORIDE, SODIUM LACTATE, POTASSIUM CHLORIDE, CALCIUM CHLORIDE 600; 310; 30; 20 MG/100ML; MG/100ML; MG/100ML; MG/100ML
INJECTION, SOLUTION INTRAVENOUS CONTINUOUS
Status: DISCONTINUED | OUTPATIENT
Start: 2022-08-05 | End: 2022-08-09 | Stop reason: HOSPADM

## 2022-08-05 RX ADMIN — SODIUM SULFATE, POTASSIUM SULFATE, MAGNESIUM SULFATE 354 ML: 17.5; 3.13; 1.6 SOLUTION, CONCENTRATE ORAL at 04:08

## 2022-08-05 RX ADMIN — PROPOFOL 100 MG: 10 INJECTION, EMULSION INTRAVENOUS at 11:08

## 2022-08-05 RX ADMIN — LIDOCAINE HYDROCHLORIDE 100 MG: 20 INJECTION, SOLUTION EPIDURAL; INFILTRATION; INTRACAUDAL; PERINEURAL at 01:08

## 2022-08-05 RX ADMIN — LISINOPRIL 20 MG: 20 TABLET ORAL at 08:08

## 2022-08-05 RX ADMIN — ROPINIROLE HYDROCHLORIDE 1 MG: 1 TABLET, FILM COATED ORAL at 08:08

## 2022-08-05 RX ADMIN — BUPROPION HYDROCHLORIDE 150 MG: 150 TABLET, EXTENDED RELEASE ORAL at 08:08

## 2022-08-05 RX ADMIN — PROPOFOL 50 MG: 10 INJECTION, EMULSION INTRAVENOUS at 11:08

## 2022-08-05 RX ADMIN — PROPOFOL 100 MG: 10 INJECTION, EMULSION INTRAVENOUS at 10:08

## 2022-08-05 RX ADMIN — BUPROPION HYDROCHLORIDE 150 MG: 150 TABLET, EXTENDED RELEASE ORAL at 09:08

## 2022-08-05 RX ADMIN — FERRIC CARBOXYMALTOSE INJECTION 750 MG: 50 INJECTION, SOLUTION INTRAVENOUS at 02:08

## 2022-08-05 RX ADMIN — SODIUM CHLORIDE, SODIUM GLUCONATE, SODIUM ACETATE, POTASSIUM CHLORIDE AND MAGNESIUM CHLORIDE: 526; 502; 368; 37; 30 INJECTION, SOLUTION INTRAVENOUS at 10:08

## 2022-08-05 RX ADMIN — ROPINIROLE HYDROCHLORIDE 3 MG: 1 TABLET, FILM COATED ORAL at 09:08

## 2022-08-05 RX ADMIN — FUROSEMIDE 20 MG: 20 TABLET ORAL at 08:08

## 2022-08-05 NOTE — PLAN OF CARE
Continue current POC. Pt going for colonoscopy. Consult to general surgery for removal of tunneled dialysis as ordered by renal MD.

## 2022-08-05 NOTE — PLAN OF CARE
08/05/22 1032   Discharge Assessment   Assessment Type Discharge Planning Assessment   Confirmed/corrected address, phone number and insurance Yes   Confirmed Demographics Correct on Facesheet   Source of Information patient   Communicated LYUBOV with patient/caregiver Date not available/Unable to determine   Lives With sibling(s)   Do you expect to return to your current living situation? Yes   Do you have help at home or someone to help you manage your care at home? Yes   Who are your caregiver(s) and their phone number(s)? sister   Prior to hospitilization cognitive status: Unable to Assess   Current cognitive status: Alert/Oriented   Walking or Climbing Stairs Difficulty ambulation difficulty, requires equipment   Mobility Management rolling walker   Dressing/Bathing Difficulty none   Do you have any problems with: Errands/Grocery   Home Accessibility wheelchair accessible   Home Layout Able to live on 1st floor   Equipment Currently Used at Home walker, rolling;oxygen   Readmission within 30 days? No   Patient currently being followed by outpatient case management? No   Do you currently have service(s) that help you manage your care at home? No   Do you take prescription medications? Yes   Do you have prescription coverage? Yes   Do you have any problems affording any of your prescribed medications? No   Is the patient taking medications as prescribed? yes   Are you on dialysis? Yes   Dialysis Name and Scheduled days [facility] MWF @ 1030   Do you take coumadin? No   Discharge Plan A Home Health   Discharge Plan B Home with family   DME Needed Upon Discharge  none   Discharge Barriers Identified None

## 2022-08-05 NOTE — PROGRESS NOTES
PT NPO for Colonscopy +/- VCE  Tolerating prep  Almost clear  No blood in stooling overnight      EGD yesterday   Impression:            - No upper GI source of anemia                          -Normal esophagus.                          - Normal stomach.                          - Normal examined duodenum.                          - No specimens collected.     Further recs post procedure today

## 2022-08-05 NOTE — ANESTHESIA PREPROCEDURE EVALUATION
"                                                                                                             08/05/2022  Natalia Bower is a 66 y.o., female with ----------------------------  COPD (chronic obstructive pulmonary disease)  Depression  HLD (hyperlipidemia)  Hypertension  PAD (peripheral artery disease)  PVD (peripheral vascular disease)  RLS (restless legs syndrome)  And ----------------------------  Insertion of tunneled central venous hemodialysis catheter      Comment:  Procedure: INSERTION, CATHETER, CENTRAL VENOUS,                HEMODIALYSIS, TUNNELED;  Surgeon: Benoit Soliman III, MD;                 Location: [facility] CATH LAB;  Service: Peripheral Vascular;                 Laterality: N/A;  TUNNEL CATH INSERTION  Insertion of tunneled central venous hemodialysis catheter      Comment:  Procedure: INSERTION, CATHETER, CENTRAL VENOUS,                HEMODIALYSIS, TUNNELED;  Surgeon: Dejan Myers MD;                 Location: [facility] CATH LAB;  Service: Peripheral Vascular;                 Laterality: N/A;  TUNNELED CATH EXCHANGE    Here for Colonoscopy - EGD yesterday negative    "   Natalia Bower is a 66 y.o. female had acute kidney injury post COVID infection.  She was dialyzed while in the hospital and only 1 time as an outpatient in [facility] or [facility].  Presented to this hospital with shortness of breath and severe anemia.  Patient did receive 2 units of packed RBCs.  GI workup is in progress.  Upper endoscopy has been negative.  She is going for colonoscopy today.  Iron studies shows that she does have severe iron deficiency.  Otherwise no chest pain no abdominal pain no nausea vomiting.  No cough cold congestion.  No shortness of breath.  Reports that she has been ambulating in the room without any need for oxygen."   Latest Reference Range & Units 08/05/22 04:06   WBC 4.5 - 11.5 x10(3)/mcL 6.2   Hemoglobin 12.0 - 16.0 gm/dL 8.7 (L)   Hematocrit 37.0 - 47.0 % 27.2 (L)   Platelets 130 - 400 " x10(3)/mcL 404 (H)   Sodium 136 - 145 mmol/L 142   Potassium 3.5 - 5.1 mmol/L 3.9   Chloride 98 - 107 mmol/L 103   CO2 23 - 31 mmol/L 31   BUN 9.8 - 20.1 mg/dL 18.6   Creatinine 0.55 - 1.02 mg/dL 1.27 (H)   eGFR if non African American mls/min/1.73/m2 44   Glucose 82 - 115 mg/dL 88   Albumin 3.4 - 4.8 gm/dL 3.1 (L)   AST 5 - 34 unit/L 24   ALT 0 - 55 unit/L 21   (L): Data is abnormally low  (H): Data is abnormally high    Pre-op Assessment    I have reviewed the NPO Status.      Review of Systems      Physical Exam  General: Cooperative, Alert, Oriented and Cachexia    Airway:  Mallampati: II   Mouth Opening: Normal  TM Distance: Normal  Tongue: Normal  Neck ROM: Normal ROM    Dental:  Dentures  To be removed  Chest/Lungs:  Clear to auscultation, Normal Respiratory Rate  Nasal cannula in place  Heart:  Rate: Normal  Rhythm: Regular Rhythm  Right chest wall dialysis catheter      Anesthesia Plan  Type of Anesthesia, risks & benefits discussed:    Anesthesia Type: Gen Natural Airway  Intra-op Monitoring Plan: Standard ASA Monitors  Post Op Pain Control Plan: IV/PO Opioids PRN  Induction:  IV  Airway Plan: Direct  Informed Consent: Informed consent signed with the Patient and all parties understand the risks and agree with anesthesia plan.  All questions answered. Patient consented to blood products? No  ASA Score: 3  Day of Surgery Review of History & Physical: H&P Update referred to the surgeon/provider.  Anesthesia Plan Notes: Nasal cannula vs facemask supplemental oxygenation   For patients with XIOMARA/obesity, may consider SuperNoval Nasal CPAP      Ready For Surgery From Anesthesia Perspective.     .

## 2022-08-05 NOTE — ANESTHESIA POSTPROCEDURE EVALUATION
Anesthesia Post Evaluation    Patient: Natalia Bower    Procedure(s) Performed: Procedure(s) (LRB):  COLONOSCOPY (N/A)  COLONOSCOPY, WITH POLYPECTOMY USING HOT BIOPSY FORCEPS  EGD, WITH POLYPECTOMY USING SNARE  IMAGING PROCEDURE, GI TRACT, INTRALUMINAL, VIA CAPSULE (N/A)    Final Anesthesia Type: general      Patient location during evaluation: PACU  Patient participation: Yes- Able to Participate  Level of consciousness: awake and alert  Post-procedure vital signs: reviewed and stable  Pain management: adequate  Airway patency: patent    PONV status at discharge: No PONV  Anesthetic complications: no      Cardiovascular status: hemodynamically stable  Respiratory status: unassisted  Hydration status: euvolemic  Follow-up not needed.          Vitals Value Taken Time   /56 08/05/22 1130   Temp 36.5 °C (97.7 °F) 08/05/22 1111   Pulse 85 08/05/22 1130   Resp 21 08/05/22 1130   SpO2 97 % 08/05/22 1130         No case tracking events are documented in the log.      Pain/Pricilla Score: Pricilla Score: 9 (8/5/2022 11:32 AM)

## 2022-08-05 NOTE — PROVATION PATIENT INSTRUCTIONS
Discharge Summary/Instructions after an Endoscopic Procedure  Patient Name: Natalia Bower  Patient MRN: [id number]  Patient YOB: 1955 Friday, August 5, 2022  Alex Persaud MD  Dear patient,  As a result of recent federal legislation (The Federal Cures Act), you may   receive lab or pathology results from your procedure in your [facility]   account before your physician is able to contact you. Your physician or   their representative will relay the results to you with their   recommendations at their soonest availability.  Thank you,  RESTRICTIONS:  During your procedure today, you received medications for sedation.  These   medications may affect your judgment, balance and coordination.  Therefore,   for 24 hours, you have the following restrictions:   - DO NOT drive a car, operate machinery, make legal/financial decisions,   sign important papers or drink alcohol.    ACTIVITY:  Today: no heavy lifting, straining or running due to procedural   sedation/anesthesia.  The following day: return to full activity including work.  DIET:  Eat and drink normally unless instructed otherwise.     TREATMENT FOR COMMON SIDE EFFECTS:  - Mild abdominal pain, nausea, belching, bloating or excessive gas:  rest,   eat lightly and use a heating pad.  - Sore Throat: treat with throat lozenges and/or gargle with warm salt   water.  - Because air was used during the procedure, expelling large amounts of air   from your rectum or belching is normal.  - If a bowel prep was taken, you may not have a bowel movement for 1-3 days.    This is normal.  SYMPTOMS TO WATCH FOR AND REPORT TO YOUR PHYSICIAN:  1. Abdominal pain or bloating, other than gas cramps.  2. Chest pain.  3. Back pain.  4. Signs of infection such as: chills or fever occurring within 24 hours   after the procedure.  5. Rectal bleeding, which would show as bright red, maroon, or black stools.   (A tablespoon of blood from the rectum is not serious, especially  if   hemorrhoids are present.)  6. Vomiting.  7. Weakness or dizziness.  GO DIRECTLY TO THE NEAREST EMERGENCY ROOM IF YOU HAVE ANY OF THE FOLLOWING:      Difficulty breathing              Chills and/or fever over 101 F   Persistent vomiting and/or vomiting blood   Severe abdominal pain   Severe chest pain   Black, tarry stools   Bleeding- more than one tablespoon   Any other symptom or condition that you feel may need urgent attention  Your doctor recommends these additional instructions:  If any biopsies were taken, your doctors clinic will contact you in 1 to 2   weeks with any results.  Plan for VCE to complete the work up  Repeat colonoscopy in 3 years based on the number of polyps  For questions, problems or results please call your physician - Alex Persaud MD at Work:  [phone].  [facility], EMERGENCY ROOM PHONE NUMBER: [phone]  IF A COMPLICATION OR EMERGENCY SITUATION ARISES AND YOU ARE UNABLE TO REACH   YOUR PHYSICIAN - GO DIRECTLY TO THE EMERGENCY ROOM.  MD Alex Tamayo MD  8/5/2022 11:15:40 AM  This report has been verified and signed electronically.  Dear patient,  As a result of recent federal legislation (The Federal Cures Act), you may   receive lab or pathology results from your procedure in your [facility]   account before your physician is able to contact you. Your physician or   their representative will relay the results to you with their   recommendations at their soonest availability.  Thank you,  PROVATION

## 2022-08-05 NOTE — PROGRESS NOTES
Nephrology follow up progress note    HPI:      Natalia Bower is a 66 y.o. female had acute kidney injury post COVID infection.  She was dialyzed while in the hospital and only 1 time as an outpatient in [facility] or [facility].  Presented to this hospital with shortness of breath and severe anemia.  Patient did receive 2 units of packed RBCs.  GI workup is in progress.  Upper endoscopy has been negative.  She is going for colonoscopy today.  Iron studies shows that she does have severe iron deficiency.  Otherwise no chest pain no abdominal pain no nausea vomiting.  No cough cold congestion.  No shortness of breath.  Reports that she has been ambulating in the room without any need for oxygen.    Interval history:          Review of Systems:       Past medical, family, surgical, and social history reviewed and unchanged from initial consult note.     Objective:   Awake alert oriented to time person place no acute distress noted.  Her sister present in the room.    VITAL SIGNS: 24 HR MIN & MAX LAST    Temp  Min: 97 °F (36.1 °C)  Max: 99.5 °F (37.5 °C)  97.5 °F (36.4 °C)        BP  Min: 105/66  Max: 144/78  127/60     Pulse  Min: 73  Max: 91  91     Resp  Min: 16  Max: 22  18    SpO2  Min: 91 %  Max: 100 %  (!) 91 %      GEN:  NAD  HEENT: Conjunctiva anicteric, pupils equal, MMM, OP benign  CV: RRR +S1,S2 without murmur  PULM: CTAB, unlabored  ABD: Soft, NT/ND abdomen with NABS  EXT: No cyanosis or edema  SKIN: Warm and dry  PSYCH: Awake, alert, and appropriately conversant  Vascular access:  Right IJ tunneled dialysis catheter noted.          Component Value Date/Time     08/05/2022 0406     08/03/2022 1110    K 3.9 08/05/2022 0406    K 4.4 08/03/2022 1110    CHLORIDE 103 08/05/2022 0406    CHLORIDE 99 08/03/2022 1110    CO2 31 08/05/2022 0406    CO2 30 08/03/2022 1110    BUN 18.6 08/05/2022 0406    BUN 26.5 (H) 08/03/2022 1110    CREATININE 1.27 (H) 08/05/2022 0406    CREATININE 1.58 (H) 08/03/2022 1110     CALCIUM 9.2 08/05/2022 0406    CALCIUM 9.7 08/03/2022 1110    PHOS 5.2 (H) 07/13/2022 0535            Component Value Date/Time    WBC 6.2 08/05/2022 0406    WBC 9.5 08/03/2022 1110    HGB 8.7 (L) 08/05/2022 0406    HGB 8.7 (L) 08/05/2022 0022    HCT 27.2 (L) 08/05/2022 0406    HCT 27.1 (L) 08/05/2022 0022     (H) 08/05/2022 0406     (H) 08/03/2022 1110             Assessment / Plan:   1. Acute kidney injury.  Now with improving kidney functions.  Adequate urine output.  No need for any further dialysis.  2. Iron deficiency anemia  3. High blood pressure  4. Restless leg syndrome  5. COPD on home O2  6. Coronary artery disease with history of PTCA.  Her cardiologist Dr. Kaplan.    Recommendation  Patient and her sister is detailed that her kidneys are improving and there is  no need for any further dialysis.  I will go ahead consult Dr. Soliman or any MD on-call for him to remove the tunneled dialysis catheter.  Her renal functions are improved  but not completely normalized so we will continue to monitor patient's electrolytes and urine output and renal functions.  Will give patient injectafer 1 dose for her iron deficiency anemia.

## 2022-08-05 NOTE — PROGRESS NOTES
[facility] Hospital Medicine  Progress Note    Patient Name: Natalia Bower  MRN: [id number]  Patient Class: IP- Inpatient   Admission Date: 8/3/2022  Length of Stay: 2 days  Attending Physician: Artur Ross MD  Primary Care Provider: Nohemy Burgess MD        Subjective:     Principal Problem:<principal problem not specified>    Interval History:   Today's info : seen and examined, no acute events overnight. Continues to improve   h andh improved  egd negative  cscope today    Review of Systems   Constitutional: Positive for fatigue.   HENT: Negative.    Eyes: Negative.    Respiratory: Positive for shortness of breath.    Cardiovascular: Positive for palpitations.   Gastrointestinal: Positive for blood in stool.   Endocrine: Negative.    Genitourinary: Negative.    Musculoskeletal: Negative.    Skin: Negative.    Allergic/Immunologic: Negative.    Neurological: Positive for weakness.   Hematological: Negative.    Psychiatric/Behavioral: The patient is nervous/anxious.      Objective:     Vital Signs (Most Recent):  Temp: 97.7 °F (36.5 °C) (08/05/22 1111)  Pulse: 85 (08/05/22 1130)  Resp: (!) 21 (08/05/22 1130)  BP: (!) 121/56 (08/05/22 1130)  SpO2: 97 % (2L NC) (08/05/22 1130) Vital Signs (24h Range):  Temp:  [97 °F (36.1 °C)-99.5 °F (37.5 °C)] 97.7 °F (36.5 °C)  Pulse:  [73-96] 85  Resp:  [15-22] 21  SpO2:  [91 %-100 %] 97 %  BP: (105-144)/(40-96) 121/56     Weight: 53.5 kg (118 lb)  Body mass index is 21.24 kg/m².    Intake/Output Summary (Last 24 hours) at 8/5/2022 1322  Last data filed at 8/5/2022 1106  Gross per 24 hour   Intake 200 ml   Output --   Net 200 ml      Physical Exam  Vitals reviewed.   Constitutional:       Appearance: Normal appearance.   HENT:      Head: Normocephalic and atraumatic.      Right Ear: Tympanic membrane and external ear normal.      Nose: Nose normal.      Mouth/Throat:      Mouth: Mucous membranes are moist.   Eyes:       Extraocular Movements: Extraocular movements intact.      Pupils: Pupils are equal, round, and reactive to light.   Cardiovascular:      Rate and Rhythm: Normal rate and regular rhythm.      Pulses: Normal pulses.      Heart sounds: Normal heart sounds.   Pulmonary:      Effort: Pulmonary effort is normal.      Breath sounds: Normal breath sounds.   Abdominal:      General: Abdomen is flat. Bowel sounds are normal.      Palpations: Abdomen is soft.   Musculoskeletal:         General: Normal range of motion.      Cervical back: Normal range of motion and neck supple.   Skin:     General: Skin is warm and dry.   Neurological:      General: No focal deficit present.      Mental Status: She is alert and oriented to person, place, and time.   Psychiatric:         Mood and Affect: Mood normal.         Behavior: Behavior normal.           Overview/Hospital Course: stable    Significant Labs: All pertinent labs within the past 24 hours have been reviewed.  Lab Results   Component Value Date    WBC 6.2 08/05/2022    HGB 8.7 (L) 08/05/2022    HCT 27.2 (L) 08/05/2022    MCV 89.8 08/05/2022     (H) 08/05/2022       Recent Labs   Lab 08/05/22  0406      K 3.9   CO2 31   BUN 18.6   CREATININE 1.27*   GLUCOSE 88   CALCIUM 9.2       Significant Imaging: I have reviewed all pertinent imaging results/findings within the past 24 hours.    Assessment/Plan:      There are no hospital problems to display for this patient.    VTE Risk Mitigation (From admission, onward)         Ordered     IP VTE HIGH RISK PATIENT  Once         08/03/22 1445     Place sequential compression device  Until discontinued         08/03/22 1445               Severe symptomatic anemia  Potential gi bleed  htn  hld  Recent covid pneumonia  Copd  esrd resolved  rld  Anxiety  Pad  rls  afib     Plan :  Transfuse as needed  hold plavix and eliquis  No hd per renal  Iv protonix  Gi reccs  Follow cscope  Labs in am  Gi and dvt ppx    Artur MONTAGUE  MD Cody  Department of Hospital Medicine   [facility]

## 2022-08-05 NOTE — TRANSFER OF CARE
"Anesthesia Transfer of Care Note    Patient: Natalia Bower    Procedure(s) Performed: Procedure(s) (LRB):  COLONOSCOPY (N/A)  COLONOSCOPY, WITH POLYPECTOMY USING HOT BIOPSY FORCEPS  EGD, WITH POLYPECTOMY USING SNARE  IMAGING PROCEDURE, GI TRACT, INTRALUMINAL, VIA CAPSULE (N/A)    Patient location: GI    Anesthesia Type: MAC    Transport from OR: Transported from OR on room air with adequate spontaneous ventilation    Post pain: adequate analgesia    Post assessment: no apparent anesthetic complications    Post vital signs: stable    Level of consciousness: sedated    Nausea/Vomiting: no nausea/vomiting    Complications: none    Transfer of care protocol was followed      Last vitals:   Visit Vitals  BP (!) 111/59 (BP Location: Left arm, Patient Position: Lying)   Pulse 79   Temp 36.5 °C (97.7 °F) (Tympanic)   Resp 15   Ht 5' 2.5" (1.588 m)   Wt 53.5 kg (118 lb)   SpO2 100%   Breastfeeding No   BMI 21.24 kg/m²     "

## 2022-08-06 PROCEDURE — 25000003 PHARM REV CODE 250: Performed by: INTERNAL MEDICINE

## 2022-08-06 PROCEDURE — 11000001 HC ACUTE MED/SURG PRIVATE ROOM

## 2022-08-06 RX ADMIN — ROPINIROLE HYDROCHLORIDE 1 MG: 1 TABLET, FILM COATED ORAL at 09:08

## 2022-08-06 RX ADMIN — BUPROPION HYDROCHLORIDE 150 MG: 150 TABLET, EXTENDED RELEASE ORAL at 09:08

## 2022-08-06 RX ADMIN — ROPINIROLE HYDROCHLORIDE 3 MG: 1 TABLET, FILM COATED ORAL at 08:08

## 2022-08-06 RX ADMIN — LISINOPRIL 20 MG: 20 TABLET ORAL at 09:08

## 2022-08-06 RX ADMIN — BUPROPION HYDROCHLORIDE 150 MG: 150 TABLET, EXTENDED RELEASE ORAL at 08:08

## 2022-08-06 RX ADMIN — FUROSEMIDE 20 MG: 20 TABLET ORAL at 09:08

## 2022-08-06 NOTE — PROGRESS NOTES
[facility] Medicine  Progress Note    Patient Name: Natalia Bower  MRN: [id number]  Patient Class: IP- Inpatient   Admission Date: 8/3/2022  Length of Stay: 3 days  Attending Physician: Artur Ross MD  Primary Care Provider: Nohemy Burgess MD        Subjective:     Principal Problem:<principal problem not specified>    Interval History:   Today's info : seen and examined, no acute events overnight. Continues to improve   h andh improved  egd negative  S/p COLONOSCOPY, WITH POLYPECTOMY USING HOT BIOPSY FORCEPS    Review of Systems   Constitutional: Positive for fatigue.   HENT: Negative.    Eyes: Negative.    Respiratory: Positive for shortness of breath.    Cardiovascular: Positive for palpitations.   Gastrointestinal: Positive for blood in stool.   Endocrine: Negative.    Genitourinary: Negative.    Musculoskeletal: Negative.    Skin: Negative.    Allergic/Immunologic: Negative.    Neurological: Positive for weakness.   Hematological: Negative.    Psychiatric/Behavioral: The patient is nervous/anxious.      Objective:     Vital Signs (Most Recent):  Temp: 97.7 °F (36.5 °C) (08/06/22 1235)  Pulse: 70 (08/06/22 1235)  Resp: 17 (08/06/22 1235)  BP: 120/61 (08/06/22 1235)  SpO2: 97 % (08/06/22 1235) Vital Signs (24h Range):  Temp:  [97.4 °F (36.3 °C)-98.5 °F (36.9 °C)] 97.7 °F (36.5 °C)  Pulse:  [70-86] 70  Resp:  [17] 17  SpO2:  [91 %-97 %] 97 %  BP: (106-139)/(61-69) 120/61     Weight: 53.5 kg (118 lb)  Body mass index is 21.24 kg/m².    Intake/Output Summary (Last 24 hours) at 8/6/2022 1246  Last data filed at 8/5/2022 1436  Gross per 24 hour   Intake 240 ml   Output --   Net 240 ml      Physical Exam  Vitals reviewed.   Constitutional:       Appearance: Normal appearance.   HENT:      Head: Normocephalic and atraumatic.      Right Ear: Tympanic membrane and external ear normal.      Nose: Nose normal.      Mouth/Throat:      Mouth: Mucous  membranes are moist.   Eyes:      Extraocular Movements: Extraocular movements intact.      Pupils: Pupils are equal, round, and reactive to light.   Cardiovascular:      Rate and Rhythm: Normal rate and regular rhythm.      Pulses: Normal pulses.      Heart sounds: Normal heart sounds.   Pulmonary:      Effort: Pulmonary effort is normal.      Breath sounds: Normal breath sounds.   Abdominal:      General: Abdomen is flat. Bowel sounds are normal.      Palpations: Abdomen is soft.   Musculoskeletal:         General: Normal range of motion.      Cervical back: Normal range of motion and neck supple.   Skin:     General: Skin is warm and dry.   Neurological:      General: No focal deficit present.      Mental Status: She is alert and oriented to person, place, and time.   Psychiatric:         Mood and Affect: Mood normal.         Behavior: Behavior normal.           Overview/Hospital Course: stable    Significant Labs: All pertinent labs within the past 24 hours have been reviewed.  Lab Results   Component Value Date    WBC 6.2 08/05/2022    HGB 8.7 (L) 08/05/2022    HCT 27.2 (L) 08/05/2022    MCV 89.8 08/05/2022     (H) 08/05/2022       Recent Labs   Lab 08/05/22  0406      K 3.9   CO2 31   BUN 18.6   CREATININE 1.27*   GLUCOSE 88   CALCIUM 9.2       Significant Imaging: I have reviewed all pertinent imaging results/findings within the past 24 hours.    Assessment/Plan:      There are no hospital problems to display for this patient.    VTE Risk Mitigation (From admission, onward)         Ordered     IP VTE HIGH RISK PATIENT  Once         08/03/22 1445     Place sequential compression device  Until discontinued         08/03/22 1445               Severe symptomatic anemia  Potential gi bleed  htn  hld  Recent covid pneumonia  Copd  esrd resolved  rld  Anxiety  Pad  rls  afib     Plan :  Transfuse as needed  hold plavix and eliquis  No hd per renal  Iv protonix  Gi reccs  Follow cscope  Labs in am  Gi  and dvt ppx    Artur Ross MD  Department of Hospital Medicine   [facility]

## 2022-08-07 LAB
ALBUMIN SERPL-MCNC: 2.7 GM/DL (ref 3.4–4.8)
ALBUMIN/GLOB SERPL: 0.8 RATIO (ref 1.1–2)
ALP SERPL-CCNC: 79 UNIT/L (ref 40–150)
ALT SERPL-CCNC: 18 UNIT/L (ref 0–55)
AST SERPL-CCNC: 29 UNIT/L (ref 5–34)
BASOPHILS # BLD AUTO: 0.02 X10(3)/MCL (ref 0–0.2)
BASOPHILS NFR BLD AUTO: 0.4 %
BILIRUBIN DIRECT+TOT PNL SERPL-MCNC: 0.3 MG/DL
BUN SERPL-MCNC: 18.6 MG/DL (ref 9.8–20.1)
CALCIUM SERPL-MCNC: 8.6 MG/DL (ref 8.4–10.2)
CHLORIDE SERPL-SCNC: 104 MMOL/L (ref 98–107)
CO2 SERPL-SCNC: 29 MMOL/L (ref 23–31)
CREAT SERPL-MCNC: 1.37 MG/DL (ref 0.55–1.02)
EOSINOPHIL # BLD AUTO: 0.15 X10(3)/MCL (ref 0–0.9)
EOSINOPHIL NFR BLD AUTO: 2.8 %
ERYTHROCYTE [DISTWIDTH] IN BLOOD BY AUTOMATED COUNT: 17.4 % (ref 11.5–17)
ERYTHROCYTE [SEDIMENTATION RATE] IN BLOOD: 28 MM/HR (ref 0–20)
GLOBULIN SER-MCNC: 3.2 GM/DL (ref 2.4–3.5)
GLUCOSE SERPL-MCNC: 94 MG/DL (ref 82–115)
HCT VFR BLD AUTO: 26.9 % (ref 37–47)
HGB BLD-MCNC: 8.5 GM/DL (ref 12–16)
IMM GRANULOCYTES # BLD AUTO: 0.01 X10(3)/MCL (ref 0–0.04)
IMM GRANULOCYTES NFR BLD AUTO: 0.2 %
LYMPHOCYTES # BLD AUTO: 0.99 X10(3)/MCL (ref 0.6–4.6)
LYMPHOCYTES NFR BLD AUTO: 18.4 %
MCH RBC QN AUTO: 28.6 PG (ref 27–31)
MCHC RBC AUTO-ENTMCNC: 31.6 MG/DL (ref 33–36)
MCV RBC AUTO: 90.6 FL (ref 80–94)
MONOCYTES # BLD AUTO: 0.53 X10(3)/MCL (ref 0.1–1.3)
MONOCYTES NFR BLD AUTO: 9.8 %
NEUTROPHILS # BLD AUTO: 3.7 X10(3)/MCL (ref 2.1–9.2)
NEUTROPHILS NFR BLD AUTO: 68.4 %
NRBC BLD AUTO-RTO: 0 %
PLATELET # BLD AUTO: 326 X10(3)/MCL (ref 130–400)
PMV BLD AUTO: 8.7 FL (ref 7.4–10.4)
POTASSIUM SERPL-SCNC: 3.4 MMOL/L (ref 3.5–5.1)
PROT SERPL-MCNC: 5.9 GM/DL (ref 5.8–7.6)
RBC # BLD AUTO: 2.97 X10(6)/MCL (ref 4.2–5.4)
SODIUM SERPL-SCNC: 140 MMOL/L (ref 136–145)
WBC # SPEC AUTO: 5.4 X10(3)/MCL (ref 4.5–11.5)

## 2022-08-07 PROCEDURE — 85651 RBC SED RATE NONAUTOMATED: CPT | Performed by: INTERNAL MEDICINE

## 2022-08-07 PROCEDURE — 25000003 PHARM REV CODE 250: Performed by: INTERNAL MEDICINE

## 2022-08-07 PROCEDURE — 80053 COMPREHEN METABOLIC PANEL: CPT | Performed by: INTERNAL MEDICINE

## 2022-08-07 PROCEDURE — 11000001 HC ACUTE MED/SURG PRIVATE ROOM

## 2022-08-07 PROCEDURE — 85025 COMPLETE CBC W/AUTO DIFF WBC: CPT | Performed by: INTERNAL MEDICINE

## 2022-08-07 PROCEDURE — 83516 IMMUNOASSAY NONANTIBODY: CPT | Performed by: INTERNAL MEDICINE

## 2022-08-07 PROCEDURE — 36415 COLL VENOUS BLD VENIPUNCTURE: CPT | Performed by: INTERNAL MEDICINE

## 2022-08-07 PROCEDURE — 27000221 HC OXYGEN, UP TO 24 HOURS

## 2022-08-07 RX ADMIN — ROPINIROLE HYDROCHLORIDE 3 MG: 1 TABLET, FILM COATED ORAL at 09:08

## 2022-08-07 RX ADMIN — ROPINIROLE HYDROCHLORIDE 1 MG: 1 TABLET, FILM COATED ORAL at 08:08

## 2022-08-07 RX ADMIN — LISINOPRIL 20 MG: 20 TABLET ORAL at 08:08

## 2022-08-07 RX ADMIN — BUPROPION HYDROCHLORIDE 150 MG: 150 TABLET, EXTENDED RELEASE ORAL at 09:08

## 2022-08-07 RX ADMIN — FUROSEMIDE 20 MG: 20 TABLET ORAL at 08:08

## 2022-08-07 RX ADMIN — BUPROPION HYDROCHLORIDE 150 MG: 150 TABLET, EXTENDED RELEASE ORAL at 08:08

## 2022-08-07 NOTE — PROGRESS NOTES
[facility] Medicine  Progress Note    Patient Name: Natalia Bower  MRN: [id number]  Patient Class: IP- Inpatient   Admission Date: 8/3/2022  Length of Stay: 4 days  Attending Physician: Artur Ross MD  Primary Care Provider: Nohemy Burgess MD        Subjective:     Principal Problem:<principal problem not specified>    Interval History:   Today's info : seen and examined, no acute events overnight. Continues to improve   h andh improved  egd negative  S/p COLONOSCOPY, WITH POLYPECTOMY USING HOT BIOPSY FORCEPS    Review of Systems   Constitutional: Positive for fatigue.   HENT: Negative.    Eyes: Negative.    Respiratory: Positive for shortness of breath.    Cardiovascular: Positive for palpitations.   Gastrointestinal: Positive for blood in stool.   Endocrine: Negative.    Genitourinary: Negative.    Musculoskeletal: Negative.    Skin: Negative.    Allergic/Immunologic: Negative.    Neurological: Positive for weakness.   Hematological: Negative.    Psychiatric/Behavioral: The patient is nervous/anxious.      Objective:     Vital Signs (Most Recent):  Temp: 97.5 °F (36.4 °C) (08/07/22 1159)  Pulse: 73 (08/07/22 1159)  Resp: 18 (08/07/22 1159)  BP: 110/63 (08/07/22 1159)  SpO2: 96 % (08/07/22 1159) Vital Signs (24h Range):  Temp:  [97.5 °F (36.4 °C)-97.8 °F (36.6 °C)] 97.5 °F (36.4 °C)  Pulse:  [70-78] 73  Resp:  [17-20] 18  SpO2:  [92 %-99 %] 96 %  BP: (110-134)/(55-63) 110/63     Weight: 53.5 kg (118 lb)  Body mass index is 21.24 kg/m².    Intake/Output Summary (Last 24 hours) at 8/7/2022 1221  Last data filed at 8/7/2022 0500  Gross per 24 hour   Intake 600 ml   Output --   Net 600 ml      Physical Exam  Vitals reviewed.   Constitutional:       Appearance: Normal appearance.   HENT:      Head: Normocephalic and atraumatic.      Right Ear: Tympanic membrane and external ear normal.      Nose: Nose normal.      Mouth/Throat:      Mouth: Mucous  membranes are moist.   Eyes:      Extraocular Movements: Extraocular movements intact.      Pupils: Pupils are equal, round, and reactive to light.   Cardiovascular:      Rate and Rhythm: Normal rate and regular rhythm.      Pulses: Normal pulses.      Heart sounds: Normal heart sounds.   Pulmonary:      Effort: Pulmonary effort is normal.      Breath sounds: Normal breath sounds.   Abdominal:      General: Abdomen is flat. Bowel sounds are normal.      Palpations: Abdomen is soft.   Musculoskeletal:         General: Normal range of motion.      Cervical back: Normal range of motion and neck supple.   Skin:     General: Skin is warm and dry.   Neurological:      General: No focal deficit present.      Mental Status: She is alert and oriented to person, place, and time.   Psychiatric:         Mood and Affect: Mood normal.         Behavior: Behavior normal.           Overview/Hospital Course: stable    Significant Labs: All pertinent labs within the past 24 hours have been reviewed.  Lab Results   Component Value Date    WBC 5.4 08/07/2022    HGB 8.5 (L) 08/07/2022    HCT 26.9 (L) 08/07/2022    MCV 90.6 08/07/2022     08/07/2022       Recent Labs   Lab 08/07/22  0425      K 3.4*   CO2 29   BUN 18.6   CREATININE 1.37*   GLUCOSE 94   CALCIUM 8.6       Significant Imaging: I have reviewed all pertinent imaging results/findings within the past 24 hours.    Assessment/Plan:      There are no hospital problems to display for this patient.    VTE Risk Mitigation (From admission, onward)         Ordered     IP VTE HIGH RISK PATIENT  Once         08/03/22 1445     Place sequential compression device  Until discontinued         08/03/22 1445               Severe symptomatic anemia  Potential gi bleed  htn  hld  Recent covid pneumonia  Copd  esrd resolved  rld  Anxiety  Pad  rls  afib     Plan :  Transfuse as needed  hold plavix and eliquis  No hd per renal  Iv protonix  Gi reccs  Follow cscope  Labs in am  Gi and  dvt ppx    Artur Ross MD  Department of Hospital Medicine   [facility]

## 2022-08-07 NOTE — PROGRESS NOTES
Nephrology follow up progress note    HPI:      Natalia Bower is a 66 y.o. female had acute kidney injury post COVID infection.  She was dialyzed while in the hospital and only 1 time as an outpatient in [facility].  Presented to this hospital with shortness of breath and severe anemia.  Patient did receive 2 units of packed RBCs.  Colonoscopy was done and polypectomy was performed.  EGD was negative.  Iron studies shows that she does have severe iron deficiency but tolerated injected for without issue..  Otherwise no chest pain , shortness of breath or nausea or vomiting.    Interval history:          Review of Systems:       Past medical, family, surgical, and social history reviewed and unchanged from initial consult note.     Objective:   Awake alert oriented to time person place no acute distress noted.  Her sister present in the room.    VITAL SIGNS: 24 HR MIN & MAX LAST    Temp  Min: 97.5 °F (36.4 °C)  Max: 97.8 °F (36.6 °C)  97.5 °F (36.4 °C)        BP  Min: 110/58  Max: 134/58  (!) 126/55     Pulse  Min: 70  Max: 78  77     Resp  Min: 17  Max: 20  18    SpO2  Min: 92 %  Max: 99 %  (!) 93 %      GEN:  NAD  HEENT: Conjunctiva anicteric, pupils equal, MMM, OP benign  CV: RRR +S1,S2 without murmur  PULM: CTAB, unlabored  ABD: Soft, NT/ND abdomen with NABS  EXT: No cyanosis or edema  SKIN: Warm and dry  PSYCH: Awake, alert, and appropriately conversant            Component Value Date/Time     08/07/2022 0425     08/05/2022 0406    K 3.4 (L) 08/07/2022 0425    K 3.9 08/05/2022 0406    CHLORIDE 104 08/07/2022 0425    CHLORIDE 103 08/05/2022 0406    CO2 29 08/07/2022 0425    CO2 31 08/05/2022 0406    BUN 18.6 08/07/2022 0425    BUN 18.6 08/05/2022 0406    CREATININE 1.37 (H) 08/07/2022 0425    CREATININE 1.27 (H) 08/05/2022 0406    CALCIUM 8.6 08/07/2022 0425    CALCIUM 9.2 08/05/2022 0406    PHOS 5.2 (H) 07/13/2022 0535            Component Value Date/Time    WBC 5.4 08/07/2022 0425    WBC  6.2 08/05/2022 0406    HGB 8.5 (L) 08/07/2022 0425    HGB 8.7 (L) 08/05/2022 0406    HCT 26.9 (L) 08/07/2022 0425    HCT 27.2 (L) 08/05/2022 0406     08/07/2022 0425     (H) 08/05/2022 0406             Assessment / Plan:   1. Acute kidney injury.  Now with improving kidney functions.  Adequate urine output.  No need for any further dialysis.  2. Iron deficiency anemia  3. High blood pressure  4. Restless leg syndrome  5. COPD on home O2  6. Coronary artery disease with history of PTCA.  Her cardiologist Dr. Kaplan.    Recommendation  Patient and her sister is detailed that her kidneys are improving and the right IJ catheter has been removed.  The patient wishes to be followed by Dr. Adame after discharge.  Patient did receive IV Injectafer x1 dose.    Carlos Callejas MD

## 2022-08-07 NOTE — OP NOTE
Vascular Surgery  Procedure Note     Patient Name: Natalia Bower  Age: 66 y.o.  Sex: female  YOB: 1955  MRN: [id number]  Author: Dejan Myers MD  Location: [facility]     Date: 8/5/2022     PREOPERATIVE DIAGNOSIS:    Acute renal failure      POSTOPERATIVE DIAGNOSIS: Same     PROCEDURE PERFORMED:  Removal of right IJ tunneled line     SURGEON: Dejan Myers M.D.     ASSISTANT:  None     ANESTHESIA:  Local anesthesia        INDICATIONS:  The patient is a 66 y.o. female with acute renal injury. Patient has recovery of renal function and no longer needing hemodialysis.        DESCRIPTION OF PROCEDURE:     The patient right chest prepped sterilely. Local anesthesia was administered around the catheter and cuff. Dissection was performed to free the cuff from the subcutaneous tissue. Once the cuff was free from the subcutaneous tissue, the catheter was removed and manual pressure was held for hemostasis. A sterile dressing was applied.     Attestation: I was present for the entirety of the procedure.    Dejan Myers MD

## 2022-08-08 LAB
ALBUMIN SERPL-MCNC: 2.6 GM/DL (ref 3.4–4.8)
ALBUMIN/GLOB SERPL: 0.9 RATIO (ref 1.1–2)
ALP SERPL-CCNC: 75 UNIT/L (ref 40–150)
ALT SERPL-CCNC: 17 UNIT/L (ref 0–55)
AST SERPL-CCNC: 24 UNIT/L (ref 5–34)
BASOPHILS # BLD AUTO: 0.02 X10(3)/MCL (ref 0–0.2)
BASOPHILS NFR BLD AUTO: 0.3 %
BILIRUBIN DIRECT+TOT PNL SERPL-MCNC: 0.3 MG/DL
BUN SERPL-MCNC: 21.1 MG/DL (ref 9.8–20.1)
CALCIUM SERPL-MCNC: 8.6 MG/DL (ref 8.4–10.2)
CHLORIDE SERPL-SCNC: 104 MMOL/L (ref 98–107)
CO2 SERPL-SCNC: 28 MMOL/L (ref 23–31)
CREAT SERPL-MCNC: 1.16 MG/DL (ref 0.55–1.02)
EOSINOPHIL # BLD AUTO: 0.13 X10(3)/MCL (ref 0–0.9)
EOSINOPHIL NFR BLD AUTO: 2.1 %
ERYTHROCYTE [DISTWIDTH] IN BLOOD BY AUTOMATED COUNT: 17.4 % (ref 11.5–17)
GFR SERPLBLD CREATININE-BSD FMLA CKD-EPI: 51 MLS/MIN/1.73/M2
GLOBULIN SER-MCNC: 2.8 GM/DL (ref 2.4–3.5)
GLUCOSE SERPL-MCNC: 94 MG/DL (ref 82–115)
HCT VFR BLD AUTO: 27.7 % (ref 37–47)
HGB BLD-MCNC: 8.4 GM/DL (ref 12–16)
IMM GRANULOCYTES # BLD AUTO: 0.03 X10(3)/MCL (ref 0–0.04)
IMM GRANULOCYTES NFR BLD AUTO: 0.5 %
LYMPHOCYTES # BLD AUTO: 0.76 X10(3)/MCL (ref 0.6–4.6)
LYMPHOCYTES NFR BLD AUTO: 12.5 %
MCH RBC QN AUTO: 28.9 PG (ref 27–31)
MCHC RBC AUTO-ENTMCNC: 30.3 MG/DL (ref 33–36)
MCV RBC AUTO: 95.2 FL (ref 80–94)
MONOCYTES # BLD AUTO: 0.68 X10(3)/MCL (ref 0.1–1.3)
MONOCYTES NFR BLD AUTO: 11.2 %
NEUTROPHILS # BLD AUTO: 4.4 X10(3)/MCL (ref 2.1–9.2)
NEUTROPHILS NFR BLD AUTO: 73.4 %
NRBC BLD AUTO-RTO: 0 %
PLATELET # BLD AUTO: 302 X10(3)/MCL (ref 130–400)
PMV BLD AUTO: 8.7 FL (ref 7.4–10.4)
POTASSIUM SERPL-SCNC: 3.8 MMOL/L (ref 3.5–5.1)
PROT SERPL-MCNC: 5.4 GM/DL (ref 5.8–7.6)
RBC # BLD AUTO: 2.91 X10(6)/MCL (ref 4.2–5.4)
SODIUM SERPL-SCNC: 138 MMOL/L (ref 136–145)
WBC # SPEC AUTO: 6.1 X10(3)/MCL (ref 4.5–11.5)

## 2022-08-08 PROCEDURE — 85025 COMPLETE CBC W/AUTO DIFF WBC: CPT | Performed by: INTERNAL MEDICINE

## 2022-08-08 PROCEDURE — 36415 COLL VENOUS BLD VENIPUNCTURE: CPT | Performed by: INTERNAL MEDICINE

## 2022-08-08 PROCEDURE — 25000003 PHARM REV CODE 250: Performed by: INTERNAL MEDICINE

## 2022-08-08 PROCEDURE — 80053 COMPREHEN METABOLIC PANEL: CPT | Performed by: INTERNAL MEDICINE

## 2022-08-08 PROCEDURE — 25000003 PHARM REV CODE 250: Performed by: PHYSICIAN ASSISTANT

## 2022-08-08 PROCEDURE — 11000001 HC ACUTE MED/SURG PRIVATE ROOM

## 2022-08-08 RX ADMIN — DOCUSATE SODIUM 50 MG: 50 CAPSULE, LIQUID FILLED ORAL at 10:08

## 2022-08-08 RX ADMIN — BUPROPION HYDROCHLORIDE 150 MG: 150 TABLET, EXTENDED RELEASE ORAL at 09:08

## 2022-08-08 RX ADMIN — ROPINIROLE HYDROCHLORIDE 3 MG: 1 TABLET, FILM COATED ORAL at 10:08

## 2022-08-08 RX ADMIN — ROPINIROLE HYDROCHLORIDE 1 MG: 1 TABLET, FILM COATED ORAL at 09:08

## 2022-08-08 RX ADMIN — LISINOPRIL 20 MG: 20 TABLET ORAL at 09:08

## 2022-08-08 RX ADMIN — BUPROPION HYDROCHLORIDE 150 MG: 150 TABLET, EXTENDED RELEASE ORAL at 10:08

## 2022-08-08 RX ADMIN — FUROSEMIDE 20 MG: 20 TABLET ORAL at 09:08

## 2022-08-08 NOTE — PROGRESS NOTES
[facility] Medicine  Progress Note    Patient Name: Natalia Bower  MRN: [id number]  Patient Class: IP- Inpatient   Admission Date: 8/3/2022  Length of Stay: 5 days  Attending Physician: Artur Ross MD  Primary Care Provider: Nohemy Burgess MD        Subjective:     Principal Problem:<principal problem not specified>    Interval History:   Today's info : seen and examined, no acute events overnight. Continues to improve   h andh improved  egd negative  S/p COLONOSCOPY, WITH POLYPECTOMY USING HOT BIOPSY FORCEPS  PENDING CAPSULE STUDY    Review of Systems   Constitutional: Positive for fatigue.   HENT: Negative.    Eyes: Negative.    Respiratory: Positive for shortness of breath.    Cardiovascular: Positive for palpitations.   Gastrointestinal: Positive for blood in stool.   Endocrine: Negative.    Genitourinary: Negative.    Musculoskeletal: Negative.    Skin: Negative.    Allergic/Immunologic: Negative.    Neurological: Positive for weakness.   Hematological: Negative.    Psychiatric/Behavioral: The patient is nervous/anxious.      Objective:     Vital Signs (Most Recent):  Temp: 98.2 °F (36.8 °C) (08/08/22 0736)  Pulse: 79 (08/08/22 0736)  Resp: 18 (08/08/22 0736)  BP: 125/67 (08/08/22 0911)  SpO2: (!) 93 % (08/08/22 0937) Vital Signs (24h Range):  Temp:  [97.5 °F (36.4 °C)-98.2 °F (36.8 °C)] 98.2 °F (36.8 °C)  Pulse:  [73-79] 79  Resp:  [18-20] 18  SpO2:  [92 %-100 %] 93 %  BP: (110-125)/(56-67) 125/67     Weight: 53.5 kg (118 lb)  Body mass index is 21.24 kg/m².  No intake or output data in the 24 hours ending 08/08/22 1059   Physical Exam  Vitals reviewed.   Constitutional:       Appearance: Normal appearance.   HENT:      Head: Normocephalic and atraumatic.      Right Ear: Tympanic membrane and external ear normal.      Nose: Nose normal.      Mouth/Throat:      Mouth: Mucous membranes are moist.   Eyes:      Extraocular Movements:  Extraocular movements intact.      Pupils: Pupils are equal, round, and reactive to light.   Cardiovascular:      Rate and Rhythm: Normal rate and regular rhythm.      Pulses: Normal pulses.      Heart sounds: Normal heart sounds.   Pulmonary:      Effort: Pulmonary effort is normal.      Breath sounds: Normal breath sounds.   Abdominal:      General: Abdomen is flat. Bowel sounds are normal.      Palpations: Abdomen is soft.   Musculoskeletal:         General: Normal range of motion.      Cervical back: Normal range of motion and neck supple.   Skin:     General: Skin is warm and dry.   Neurological:      General: No focal deficit present.      Mental Status: She is alert and oriented to person, place, and time.   Psychiatric:         Mood and Affect: Mood normal.         Behavior: Behavior normal.           Overview/Hospital Course: stable    Significant Labs: All pertinent labs within the past 24 hours have been reviewed.  Lab Results   Component Value Date    WBC 6.1 08/08/2022    HGB 8.4 (L) 08/08/2022    HCT 27.7 (L) 08/08/2022    MCV 95.2 (H) 08/08/2022     08/08/2022       Recent Labs   Lab 08/08/22  0402      K 3.8   CO2 28   BUN 21.1*   CREATININE 1.16*   GLUCOSE 94   CALCIUM 8.6       Significant Imaging: I have reviewed all pertinent imaging results/findings within the past 24 hours.    Assessment/Plan:      There are no hospital problems to display for this patient.    VTE Risk Mitigation (From admission, onward)         Ordered     IP VTE HIGH RISK PATIENT  Once         08/03/22 1445     Place sequential compression device  Until discontinued         08/03/22 1445               Severe symptomatic anemia  Potential gi bleed  htn  hld  Recent covid pneumonia  Copd  esrd resolved  rld  Anxiety  Pad  rls  afib     Plan :  Transfuse as needed  hold plavix and eliquis  No hd per renal  Iv protonix  Gi reccs  Labs in am  Gi and dvt ppx  Home soon once consultatnts clear    Artur Ross,  MD  Department of Hospital Medicine   [facility]

## 2022-08-08 NOTE — PROGRESS NOTES
"Gastroenterology Progress Note      HPI:    Pt feels well today. No complaints, though she asks for stool softener. Pending M2A capsule report. hgb stable. Tolerating PO well.     Vital Signs:  /67   Pulse 79   Temp 98.2 °F (36.8 °C) (Oral)   Resp 18   Ht 5' 2.5" (1.588 m)   Wt 53.5 kg (118 lb)   SpO2 (!) 92%   Breastfeeding No   BMI 21.24 kg/m²   Body mass index is 21.24 kg/m².    Physical Exam:    Physical Exam  Vitals and nursing note reviewed.   Constitutional:       Appearance: Normal appearance.   HENT:      Head: Normocephalic and atraumatic.   Eyes:      General: No scleral icterus.     Extraocular Movements: Extraocular movements intact.   Cardiovascular:      Rate and Rhythm: Normal rate and regular rhythm.      Heart sounds: Normal heart sounds.   Pulmonary:      Effort: Pulmonary effort is normal. No respiratory distress.      Breath sounds: Normal breath sounds.   Abdominal:      General: Abdomen is flat. Bowel sounds are normal. There is no distension.      Palpations: Abdomen is soft.      Tenderness: There is no abdominal tenderness.   Musculoskeletal:         General: No swelling or deformity. Normal range of motion.      Right lower leg: No edema.      Left lower leg: No edema.   Skin:     General: Skin is warm and dry.   Neurological:      General: No focal deficit present.      Mental Status: She is alert and oriented to person, place, and time.   Psychiatric:         Mood and Affect: Mood normal.         Behavior: Behavior normal.         Labs:  Recent Results (from the past 48 hour(s))   Comprehensive Metabolic Panel    Collection Time: 08/07/22  4:25 AM   Result Value Ref Range    Sodium Level 140 136 - 145 mmol/L    Potassium Level 3.4 (L) 3.5 - 5.1 mmol/L    Chloride 104 98 - 107 mmol/L    Carbon Dioxide 29 23 - 31 mmol/L    Glucose Level 94 82 - 115 mg/dL    Blood Urea Nitrogen 18.6 9.8 - 20.1 mg/dL    Creatinine 1.37 (H) 0.55 - 1.02 mg/dL    Calcium Level Total 8.6 8.4 - 10.2 " mg/dL    Protein Total 5.9 5.8 - 7.6 gm/dL    Albumin Level 2.7 (L) 3.4 - 4.8 gm/dL    Globulin 3.2 2.4 - 3.5 gm/dL    Albumin/Globulin Ratio 0.8 (L) 1.1 - 2.0 ratio    Bilirubin Total 0.3 <=1.5 mg/dL    Alkaline Phosphatase 79 40 - 150 unit/L    Alanine Aminotransferase 18 0 - 55 unit/L    Aspartate Aminotransferase 29 5 - 34 unit/L    Estimated GFR-Non  41 mls/min/1.73/m2   CBC with Differential    Collection Time: 08/07/22  4:25 AM   Result Value Ref Range    WBC 5.4 4.5 - 11.5 x10(3)/mcL    RBC 2.97 (L) 4.20 - 5.40 x10(6)/mcL    Hgb 8.5 (L) 12.0 - 16.0 gm/dL    Hct 26.9 (L) 37.0 - 47.0 %    MCV 90.6 80.0 - 94.0 fL    MCH 28.6 27.0 - 31.0 pg    MCHC 31.6 (L) 33.0 - 36.0 mg/dL    RDW 17.4 (H) 11.5 - 17.0 %    Platelet 326 130 - 400 x10(3)/mcL    MPV 8.7 7.4 - 10.4 fL    Neut % 68.4 %    Lymph % 18.4 %    Mono % 9.8 %    Eos % 2.8 %    Basophil % 0.4 %    Lymph # 0.99 0.6 - 4.6 x10(3)/mcL    Neut # 3.7 2.1 - 9.2 x10(3)/mcL    Mono # 0.53 0.1 - 1.3 x10(3)/mcL    Eos # 0.15 0 - 0.9 x10(3)/mcL    Baso # 0.02 0 - 0.2 x10(3)/mcL    IG# 0.01 0 - 0.04 x10(3)/mcL    IG% 0.2 %    NRBC% 0.0 %   Sedimentation rate    Collection Time: 08/07/22  1:18 PM   Result Value Ref Range    Sed Rate 28 (H) 0 - 20 mm/hr   Comprehensive Metabolic Panel    Collection Time: 08/08/22  4:02 AM   Result Value Ref Range    Sodium Level 138 136 - 145 mmol/L    Potassium Level 3.8 3.5 - 5.1 mmol/L    Chloride 104 98 - 107 mmol/L    Carbon Dioxide 28 23 - 31 mmol/L    Glucose Level 94 82 - 115 mg/dL    Blood Urea Nitrogen 21.1 (H) 9.8 - 20.1 mg/dL    Creatinine 1.16 (H) 0.55 - 1.02 mg/dL    Calcium Level Total 8.6 8.4 - 10.2 mg/dL    Protein Total 5.4 (L) 5.8 - 7.6 gm/dL    Albumin Level 2.6 (L) 3.4 - 4.8 gm/dL    Globulin 2.8 2.4 - 3.5 gm/dL    Albumin/Globulin Ratio 0.9 (L) 1.1 - 2.0 ratio    Bilirubin Total 0.3 <=1.5 mg/dL    Alkaline Phosphatase 75 40 - 150 unit/L    Alanine Aminotransferase 17 0 - 55 unit/L    Aspartate  Aminotransferase 24 5 - 34 unit/L    eGFR 51 mls/min/1.73/m2   CBC with Differential    Collection Time: 08/08/22  4:02 AM   Result Value Ref Range    WBC 6.1 4.5 - 11.5 x10(3)/mcL    RBC 2.91 (L) 4.20 - 5.40 x10(6)/mcL    Hgb 8.4 (L) 12.0 - 16.0 gm/dL    Hct 27.7 (L) 37.0 - 47.0 %    MCV 95.2 (H) 80.0 - 94.0 fL    MCH 28.9 27.0 - 31.0 pg    MCHC 30.3 (L) 33.0 - 36.0 mg/dL    RDW 17.4 (H) 11.5 - 17.0 %    Platelet 302 130 - 400 x10(3)/mcL    MPV 8.7 7.4 - 10.4 fL    Neut % 73.4 %    Lymph % 12.5 %    Mono % 11.2 %    Eos % 2.1 %    Basophil % 0.3 %    Lymph # 0.76 0.6 - 4.6 x10(3)/mcL    Neut # 4.4 2.1 - 9.2 x10(3)/mcL    Mono # 0.68 0.1 - 1.3 x10(3)/mcL    Eos # 0.13 0 - 0.9 x10(3)/mcL    Baso # 0.02 0 - 0.2 x10(3)/mcL    IG# 0.03 0 - 0.04 x10(3)/mcL    IG% 0.5 %    NRBC% 0.0 %         Assessment/Plan:  67 y/o female known to Dr. Aguilar who has a past medical history of ESRD on HD, COPD , PAD , HTN , Depresion , Hyperlipidemia , RLS. Here with hgb 5 seen at dialysis. No overt bleeding.      1. Normocytic anemia  - 5.4--+2 units prbcs---8.7--8.4 today stable. Still no overt bleeding    EGD 8/4/22    Impression:            - No upper GI source of anemia                          -Normal esophagus.                          - Normal stomach.                          - Normal examined duodenum.                          - No specimens collected.      colonoscopy 8/5/22 Findings:        Hemorrhoids were found on perianal exam.        Three sessile polyps were found in the descending colon and        ascending colon. The polyps were 2 to 6 mm in size. These polyps        were removed with a cold biopsy forceps. Resection and retrieval        were complete. Estimated blood loss was minimal.        Multiple small and large-mouthed diverticula were found in the        sigmoid colon.        The terminal ileum appeared normal.   - repeat colonoscopy 3 years recommended based on # of polyps.    - pending M2A capsule  report.    Love Callejas PA-C  [facility]

## 2022-08-08 NOTE — PROGRESS NOTES
Nephrology follow up progress note    HPI:      Natalia Bower is a 66 y.o. female had acute kidney injury post COVID infection.  She was dialyzed while in the hospital and only 1 time as an outpatient in [facility].  Presented to this hospital with shortness of breath and severe anemia.  Patient did receive 2 units of packed RBCs.  Colonoscopy was done and polypectomy was performed.  EGD was negative.  Iron studies shows that she does have severe iron deficiency but tolerated injected for without issue..  Otherwise no chest pain , shortness of breath or nausea or vomiting.    Interval history:     8/8/2022 feeling much better.  Sitting on the side of the bed.  No shortness of breath.  No chest pains.  No cough cold congestion.  No fever or chills.  Tunneled dialysis catheter has been removed.  She is making plenty of urine.  Waiting for capsule study to be completed.     Review of Systems:       Past medical, family, surgical, and social history reviewed and unchanged from initial consult note.     Objective:   Awake alert oriented to time person place no acute distress noted.  Her sister present in the room.    VITAL SIGNS: 24 HR MIN & MAX LAST    Temp  Min: 97.5 °F (36.4 °C)  Max: 98.2 °F (36.8 °C)  98.2 °F (36.8 °C)        BP  Min: 110/63  Max: 126/55  125/67     Pulse  Min: 73  Max: 79  79     Resp  Min: 18  Max: 20  18    SpO2  Min: 92 %  Max: 100 %  (!) 92 %      GEN:  NAD, patient's sister present in the room  HEENT: Conjunctiva anicteric, pupils equal, MMM, OP benign  CV: RRR +S1,S2 without murmur  PULM: CTAB, unlabored  ABD: Soft, NT/ND abdomen with NABS  EXT: No cyanosis or edema  SKIN: Warm and dry  PSYCH: Awake, alert, and appropriately conversant  Neurologically grossly intact.          Component Value Date/Time     08/08/2022 0402     08/07/2022 0425    K 3.8 08/08/2022 0402    K 3.4 (L) 08/07/2022 0425    CHLORIDE 104 08/08/2022 0402    CHLORIDE 104 08/07/2022 0425    CO2 28  08/08/2022 0402    CO2 29 08/07/2022 0425    BUN 21.1 (H) 08/08/2022 0402    BUN 18.6 08/07/2022 0425    CREATININE 1.16 (H) 08/08/2022 0402    CREATININE 1.37 (H) 08/07/2022 0425    CALCIUM 8.6 08/08/2022 0402    CALCIUM 8.6 08/07/2022 0425    PHOS 5.2 (H) 07/13/2022 0535            Component Value Date/Time    WBC 6.1 08/08/2022 0402    WBC 5.4 08/07/2022 0425    HGB 8.4 (L) 08/08/2022 0402    HGB 8.5 (L) 08/07/2022 0425    HCT 27.7 (L) 08/08/2022 0402    HCT 26.9 (L) 08/07/2022 0425     08/08/2022 0402     08/07/2022 0425             Assessment / Plan:   1. Acute kidney injury.  Now with improving kidney functions.  Adequate urine output.  No need for any further dialysis.  2. Iron deficiency anemia, 1 dose of injectafer  given.  3. High blood pressure  4. Restless leg syndrome  5. COPD on home O2  6. Coronary artery disease with history of PTCA.  Her cardiologist Dr. Kaplan.    Recommendation   Patient and her sister is informed that she does not need any more dialysis and she still needs to avoid all NSAIDs and Crenshaw 2 inhibitors.  Also she needs to stay off smoking.  GI workup is still going gone but from the renal standpoint of view she can be discharged and can be followed by me in the office in about 2-4 weeks.

## 2022-08-08 NOTE — PLAN OF CARE
Informed by Dr. Adame that pt has recovered her renal function and will no longer require outpt HD. Call placed to [facility] Teresita and katie Jackman.

## 2022-08-09 VITALS
DIASTOLIC BLOOD PRESSURE: 56 MMHG | TEMPERATURE: 98 F | HEART RATE: 78 BPM | RESPIRATION RATE: 18 BRPM | SYSTOLIC BLOOD PRESSURE: 113 MMHG | BODY MASS INDEX: 20.91 KG/M2 | WEIGHT: 118 LBS | OXYGEN SATURATION: 92 % | HEIGHT: 63 IN

## 2022-08-09 PROBLEM — D64.9 SYMPTOMATIC ANEMIA: Status: ACTIVE | Noted: 2022-08-09

## 2022-08-09 PROBLEM — D64.9 SYMPTOMATIC ANEMIA: Status: RESOLVED | Noted: 2022-08-09 | Resolved: 2022-08-09

## 2022-08-09 LAB
BACTERIA BLD CULT: NORMAL
BACTERIA BLD CULT: NORMAL
CYCLIC CITRULLINATED PEPTIDE (CCP) (OLG): NEGATIVE
ESTROGEN SERPL-MCNC: NORMAL PG/ML
INSULIN SERPL-ACNC: NORMAL U[IU]/ML
LAB AP CLINICAL INFORMATION: NORMAL
LAB AP GROSS DESCRIPTION: NORMAL
LAB AP REPORT FOOTNOTES: NORMAL
RHEUMATOID FACTOR IGA (OLG): NEGATIVE
RHEUMATOID FACTOR IGM (OLG): NEGATIVE
T3RU NFR SERPL: NORMAL %

## 2022-08-09 PROCEDURE — 25000003 PHARM REV CODE 250: Performed by: INTERNAL MEDICINE

## 2022-08-09 PROCEDURE — 25000003 PHARM REV CODE 250: Performed by: PHYSICIAN ASSISTANT

## 2022-08-09 PROCEDURE — 27000221 HC OXYGEN, UP TO 24 HOURS

## 2022-08-09 PROCEDURE — 94761 N-INVAS EAR/PLS OXIMETRY MLT: CPT

## 2022-08-09 RX ORDER — BISACODYL 5 MG
10 TABLET, DELAYED RELEASE (ENTERIC COATED) ORAL DAILY PRN
Qty: 30 TABLET | Refills: 1 | Status: SHIPPED | OUTPATIENT
Start: 2022-08-09

## 2022-08-09 RX ADMIN — DOCUSATE SODIUM 50 MG: 50 CAPSULE, LIQUID FILLED ORAL at 08:08

## 2022-08-09 RX ADMIN — FUROSEMIDE 20 MG: 20 TABLET ORAL at 08:08

## 2022-08-09 RX ADMIN — BUPROPION HYDROCHLORIDE 150 MG: 150 TABLET, EXTENDED RELEASE ORAL at 08:08

## 2022-08-09 RX ADMIN — LISINOPRIL 20 MG: 20 TABLET ORAL at 08:08

## 2022-08-09 RX ADMIN — ROPINIROLE HYDROCHLORIDE 1 MG: 1 TABLET, FILM COATED ORAL at 08:08

## 2022-08-09 NOTE — DISCHARGE SUMMARY
[facility] Hospital Medicine  Discharge Summary      Patient Name: Natalia Bower  MRN: [id number]  Admission Date: 8/3/2022  Hospital Length of Stay: 6 days  Discharge Date and Time:  08/09/2022 3:00 PM  Attending Physician: Artur Peña MD   Discharging Provider: Artur Peña MD  Primary Care Provider: Nohemy Burgess MD        Dc diagnoses :  Severe symptomatic anemia  Potential gi bleed  htn  hld  Recent covid pneumonia  Copd  esrd resolved  rld  Anxiety  Pad  rls  afib    Procedure(s) (LRB):  COLONOSCOPY (N/A)  COLONOSCOPY, WITH POLYPECTOMY USING HOT BIOPSY FORCEPS  EGD, WITH POLYPECTOMY USING SNARE  IMAGING PROCEDURE, GI TRACT, INTRALUMINAL, VIA CAPSULE (N/A)      Hospital Course:   65 yo female well known to me with PMH COPD, Smoker, PAD, PVD, HTN, Depression, HLD, and RLS with recent hospital admission for covid and renal failure ended up on hd then to ltac and dced home recently presented to the ed with complaints of worsening weakness and sob and further work up suggestive of severe symptomatic anemia and further admitted for blood transfusions and hd with renal consult for gi consult for potential gi bleed  h and h improved with transfusions and stable  No further signs of overt bleeding  Renal indices stable and good urine out pt no need for further hd iv iron helped with anemia  Ended up having negative egd and colonoscopy with polypectomy capsule study pending  Dc home with eliquis and holding aspirin and plavix with close pcp and gi and renal  And cards follow up    Consults:   Consults (From admission, onward)        Status Ordering Provider     Inpatient consult to General Surgery  Once        Provider:  Benoit Soliman III, MD    Acknowledged JACKELYN ADAME     Inpatient consult to Nephrology  Once        Provider:  Jackelyn Adame MD    Acknowledged ARTUR PEÑA     Inpatient consult to Gastroenterology  Once        Provider:   Alex Persaud MD    Completed ERIK HERRERA     Inpatient consult to Nephrology  Once        Provider:  Brian Rick MD    Completed ERIK HERRERA          Final Active Diagnoses:      Problems Resolved During this Admission:    Diagnosis Date Noted Date Resolved POA    PRINCIPAL PROBLEM:  Symptomatic anemia [D64.9] 2022 Unknown      Discharged Condition: good    Disposition: Home or Self Care    Follow Up:   Follow-up Information     Nohemy Burgess MD Follow up.    Specialty: Internal Medicine  Contact information:  [address] [place] [id number]  [phone]                       Patient Instructions:      Diet Adult Regular     Activity as tolerated     Medications:  Reconciled Home Medications:      Medication List      START taking these medications    bisacodyL 5 mg EC tablet  Commonly known as: DULCOLAX  Take 2 tablets (10 mg total) by mouth daily as needed for Constipation.        CONTINUE taking these medications    albuterol 90 mcg/actuation inhaler  Commonly known as: PROVENTIL/VENTOLIN HFA  SMARTSI Puff(s) By Mouth Every 4 Hours PRN     buPROPion 150 MG TBSR 12 hr tablet  Commonly known as: WELLBUTRIN SR  Take 150 mg by mouth 2 (two) times daily.     co-enzyme Q-10 30 mg capsule  Take 100 mg by mouth once daily.     DECARA 1,250 mcg (50,000 unit) capsule  Generic drug: cholecalciferol (vitamin D3)  Take 50,000 Units by mouth every 7 days.     diazePAM 5 MG tablet  Commonly known as: VALIUM  SMARTSI-2 Tablet(s) By Mouth PRN     ELIQUIS 5 mg Tab  Generic drug: apixaban  Take 5 mg by mouth 2 (two) times daily.     EUTHYROX 75 MCG tablet  Generic drug: levothyroxine  Take 75 mcg by mouth once daily.     fluticasone-salmeterol 250-50 mcg/dose 250-50 mcg/dose diskus inhaler  Commonly known as: ADVAIR  Inhale 1 puff into the lungs.     furosemide 20 MG tablet  Commonly known as: LASIX  Take by mouth once daily. As needed for  edema     lisinopriL 20 MG tablet  Commonly known as: PRINIVIL,ZESTRIL  Take 20 mg by mouth once daily.     * rOPINIRole 3 MG tablet  Commonly known as: REQUIP  Take 3 mg by mouth once daily.     * rOPINIRole 1 MG tablet  Commonly known as: REQUIP  Take 1 mg by mouth once daily.     rosuvastatin 40 MG Tab  Commonly known as: CRESTOR  Take 40 mg by mouth nightly.     SPIRIVA WITH HANDIHALER 18 mcg inhalation capsule  Generic drug: tiotropium  1 capsule once daily.     SYMBICORT 160-4.5 mcg/actuation Hfaa  Generic drug: budesonide-formoterol 160-4.5 mcg  SMARTSI Puff(s) By Mouth Twice Daily         * This list has 2 medication(s) that are the same as other medications prescribed for you. Read the directions carefully, and ask your doctor or other care provider to review them with you.            STOP taking these medications    amLODIPine 5 MG tablet  Commonly known as: NORVASC     aspirin 81 MG EC tablet  Commonly known as: ECOTRIN     clopidogreL 75 mg tablet  Commonly known as: PLAVIX            Significant Diagnostic Studies: Labs:   BMP:   Recent Labs   Lab 22  0402      K 3.8   CO2 28   BUN 21.1*   CREATININE 1.16*   CALCIUM 8.6       Pending Diagnostic Studies:     Procedure Component Value Units Date/Time    RHEUMATOID ARTHRITIS PANEL [[id number]] Collected: 22 1457    Order Status: Sent Lab Status: In process Updated: 22 1526    Specimen: Blood     Rheumatoid Factors, IgA, IgG, IgM [[id number]] Collected: 22 1318    Order Status: Sent Lab Status: In process Updated: 22 1343    Specimen: Blood         Indwelling Lines/Drains at time of discharge:   Lines/Drains/Airways     None                 Time spent on the discharge of patient: 32 minutes         Artur Ross MD  Department of Hospital Medicine  [facility]

## 2022-08-09 NOTE — PLAN OF CARE
08/09/22 0948   Final Note   Assessment Type Final Discharge Note   Anticipated Discharge Disposition Home-Health   Hospital Resources/Appts/Education Provided Post-Acute resouces added to AVS   Post-Acute Status   Post-Acute Authorization Home Health;HME   HME Status Referrals Sent  (Patient current with [facility] request change to [facility].)   Home Health Status Referrals Sent  (FOC obtained for [facility])

## 2022-08-09 NOTE — PROGRESS NOTES
Nephrology follow up progress note    HPI:      aNtalia Bower is a 66 y.o. female had acute kidney injury post COVID infection.  She was dialyzed while in the hospital and only 1 time as an outpatient in [facility].  Presented to this hospital with shortness of breath and severe anemia.  Patient did receive 2 units of packed RBCs.  Colonoscopy was done and polypectomy was performed.  EGD was negative.  Iron studies shows that she does have severe iron deficiency but tolerated injected for without issue..  Otherwise no chest pain , shortness of breath or nausea or vomiting.    Interval history:     8/8/2022 feeling much better.  Sitting on the side of the bed.  No shortness of breath.  No chest pains.  No cough cold congestion.  No fever or chills.  Tunneled dialysis catheter has been removed.  She is making plenty of urine.  Waiting for capsule study to be completed.    8/9/2022 still waiting for the capsule study results.  Otherwise no dysuria no hematuria no hesitancy frequency urgency no chest pain no abdominal pains.  Tolerating oral nutrition.  No shortness of breath.     Review of Systems:       Past medical, family, surgical, and social history reviewed and unchanged from initial consult note.     Objective:   Awake alert oriented to time person place no acute distress noted.  Her sister present in the room.    VITAL SIGNS: 24 HR MIN & MAX LAST    Temp  Min: 97.6 °F (36.4 °C)  Max: 98.7 °F (37.1 °C)  98.4 °F (36.9 °C)        BP  Min: 108/57  Max: 123/61  (!) 113/56     Pulse  Min: 73  Max: 91  78     Resp  Min: 18  Max: 18  18    SpO2  Min: 92 %  Max: 99 %  (!) 92 %      GEN:  NAD.   HEENT: Conjunctiva anicteric, pupils equal, MMM, OP benign  CV: RRR +S1,S2 without murmur  PULM: CTAB, unlabored  ABD: Soft, NT/ND abdomen with NABS  EXT: No cyanosis or edema  SKIN: Warm and dry  PSYCH: Awake, alert, and appropriately conversant  Neurologically grossly intact.          Component Value Date/Time    NA  138 08/08/2022 0402     08/07/2022 0425    K 3.8 08/08/2022 0402    K 3.4 (L) 08/07/2022 0425    CHLORIDE 104 08/08/2022 0402    CHLORIDE 104 08/07/2022 0425    CO2 28 08/08/2022 0402    CO2 29 08/07/2022 0425    BUN 21.1 (H) 08/08/2022 0402    BUN 18.6 08/07/2022 0425    CREATININE 1.16 (H) 08/08/2022 0402    CREATININE 1.37 (H) 08/07/2022 0425    CALCIUM 8.6 08/08/2022 0402    CALCIUM 8.6 08/07/2022 0425    PHOS 5.2 (H) 07/13/2022 0535            Component Value Date/Time    WBC 6.1 08/08/2022 0402    WBC 5.4 08/07/2022 0425    HGB 8.4 (L) 08/08/2022 0402    HGB 8.5 (L) 08/07/2022 0425    HCT 27.7 (L) 08/08/2022 0402    HCT 26.9 (L) 08/07/2022 0425     08/08/2022 0402     08/07/2022 0425             Assessment / Plan:   1. Acute kidney injury.  Now with improving kidney functions.  Adequate urine output.  No need for any further dialysis.  2. Iron deficiency anemia, 1 dose of injectafer  given.  3. High blood pressure  4. Restless leg syndrome  5. COPD on home O2  6. Coronary artery disease with history of PTCA.  Her cardiologist Dr. Kaplan.    Recommendation  From the renal standpoint to be patient is again encouraged to continue with increased oral hydration and avoid all NSAIDs and Crenshaw 2 inhibitors.  Discharged whenever appropriate.

## 2022-08-10 ENCOUNTER — PATIENT OUTREACH (OUTPATIENT)
Dept: ADMINISTRATIVE | Facility: CLINIC | Age: 67
End: 2022-08-10
Payer: MEDICARE

## 2022-08-10 NOTE — PROGRESS NOTES
C3 nurse spoke with Natalia Bower for a TCC post hospital discharge follow up call. The patient has a scheduled HOSFU appointment with Nohemy Burgess MD on 08/16/2022 @ 0930 am.    Patient stated using Bri inhaler 200/25 1 puff daily

## 2022-08-15 LAB
RF IGA SER-ACNC: <5 UNITS
RF IGG SER-ACNC: <5 UNITS
RF IGM SER-ACNC: 5 UNITS

## 2022-10-19 ENCOUNTER — APPOINTMENT (OUTPATIENT)
Dept: LAB | Facility: HOSPITAL | Age: 67
End: 2022-10-19
Attending: INTERNAL MEDICINE
Payer: MEDICARE

## 2022-10-19 DIAGNOSIS — K27.9: Primary | ICD-10-CM

## 2022-10-19 DIAGNOSIS — Z00.00 ROUTINE GENERAL MEDICAL EXAMINATION AT A HEALTH CARE FACILITY: ICD-10-CM

## 2022-10-19 DIAGNOSIS — N18.2 CHRONIC KIDNEY DISEASE, STAGE II (MILD): ICD-10-CM

## 2022-10-19 DIAGNOSIS — D44.9: Primary | ICD-10-CM

## 2022-10-19 DIAGNOSIS — R73.03 DIABETES MELLITUS, LATENT: ICD-10-CM

## 2022-10-19 LAB
ALBUMIN SERPL-MCNC: 3.8 GM/DL (ref 3.4–4.8)
ALBUMIN/GLOB SERPL: 1.1 RATIO (ref 1.1–2)
ALP SERPL-CCNC: 87 UNIT/L (ref 40–150)
ALT SERPL-CCNC: 23 UNIT/L (ref 0–55)
AST SERPL-CCNC: 26 UNIT/L (ref 5–34)
BASOPHILS # BLD AUTO: 0.02 X10(3)/MCL (ref 0–0.2)
BASOPHILS NFR BLD AUTO: 0.3 %
BILIRUBIN DIRECT+TOT PNL SERPL-MCNC: 0.4 MG/DL
BUN SERPL-MCNC: 19.9 MG/DL (ref 9.8–20.1)
CALCIUM SERPL-MCNC: 10.2 MG/DL (ref 8.4–10.2)
CHLORIDE SERPL-SCNC: 99 MMOL/L (ref 98–107)
CO2 SERPL-SCNC: 28 MMOL/L (ref 23–31)
CREAT SERPL-MCNC: 1.33 MG/DL (ref 0.55–1.02)
EOSINOPHIL # BLD AUTO: 0.12 X10(3)/MCL (ref 0–0.9)
EOSINOPHIL NFR BLD AUTO: 1.8 %
ERYTHROCYTE [DISTWIDTH] IN BLOOD BY AUTOMATED COUNT: 14.6 % (ref 11.5–17)
EST. AVERAGE GLUCOSE BLD GHB EST-MCNC: 116.9 MG/DL
GFR SERPLBLD CREATININE-BSD FMLA CKD-EPI: 44 MLS/MIN/1.73/M2
GLOBULIN SER-MCNC: 3.4 GM/DL (ref 2.4–3.5)
GLUCOSE SERPL-MCNC: 87 MG/DL (ref 82–115)
HBA1C MFR BLD: 5.7 %
HCT VFR BLD AUTO: 40.1 % (ref 37–47)
HGB BLD-MCNC: 12.8 GM/DL (ref 12–16)
IMM GRANULOCYTES # BLD AUTO: 0.02 X10(3)/MCL (ref 0–0.04)
IMM GRANULOCYTES NFR BLD AUTO: 0.3 %
LYMPHOCYTES # BLD AUTO: 1.33 X10(3)/MCL (ref 0.6–4.6)
LYMPHOCYTES NFR BLD AUTO: 19.8 %
MCH RBC QN AUTO: 29.8 PG (ref 27–31)
MCHC RBC AUTO-ENTMCNC: 31.9 MG/DL (ref 33–36)
MCV RBC AUTO: 93.3 FL (ref 80–94)
MONOCYTES # BLD AUTO: 0.48 X10(3)/MCL (ref 0.1–1.3)
MONOCYTES NFR BLD AUTO: 7.2 %
NEUTROPHILS # BLD AUTO: 4.7 X10(3)/MCL (ref 2.1–9.2)
NEUTROPHILS NFR BLD AUTO: 70.6 %
NRBC BLD AUTO-RTO: 0 %
PLATELET # BLD AUTO: 344 X10(3)/MCL (ref 130–400)
PMV BLD AUTO: 9.1 FL (ref 7.4–10.4)
POTASSIUM SERPL-SCNC: 4.2 MMOL/L (ref 3.5–5.1)
PROT SERPL-MCNC: 7.2 GM/DL (ref 5.8–7.6)
RBC # BLD AUTO: 4.3 X10(6)/MCL (ref 4.2–5.4)
SODIUM SERPL-SCNC: 140 MMOL/L (ref 136–145)
WBC # SPEC AUTO: 6.7 X10(3)/MCL (ref 4.5–11.5)

## 2022-10-19 PROCEDURE — 85025 COMPLETE CBC W/AUTO DIFF WBC: CPT

## 2022-10-19 PROCEDURE — 36415 COLL VENOUS BLD VENIPUNCTURE: CPT

## 2022-10-19 PROCEDURE — 83036 HEMOGLOBIN GLYCOSYLATED A1C: CPT

## 2022-10-19 PROCEDURE — 80053 COMPREHEN METABOLIC PANEL: CPT

## 2022-10-26 ENCOUNTER — LAB VISIT (OUTPATIENT)
Dept: LAB | Facility: HOSPITAL | Age: 67
End: 2022-10-26
Attending: NURSE PRACTITIONER
Payer: MEDICARE

## 2022-10-26 DIAGNOSIS — L65.9 BALDNESS: ICD-10-CM

## 2022-10-26 DIAGNOSIS — E03.9 MYXEDEMA HEART DISEASE: Primary | ICD-10-CM

## 2022-10-26 DIAGNOSIS — I51.9 MYXEDEMA HEART DISEASE: Primary | ICD-10-CM

## 2022-10-26 LAB
T3FREE SERPL-MCNC: 1.35 PG/ML (ref 1.57–3.91)
T4 FREE SERPL-MCNC: 0.42 NG/DL (ref 0.7–1.48)
TSH SERPL-ACNC: 95.23 UIU/ML (ref 0.35–4.94)
VIT B12 SERPL-MCNC: 345 PG/ML (ref 213–816)

## 2022-10-26 PROCEDURE — 84443 ASSAY THYROID STIM HORMONE: CPT

## 2022-10-26 PROCEDURE — 84481 FREE ASSAY (FT-3): CPT

## 2022-10-26 PROCEDURE — 84439 ASSAY OF FREE THYROXINE: CPT

## 2022-10-26 PROCEDURE — 36415 COLL VENOUS BLD VENIPUNCTURE: CPT

## 2022-10-26 PROCEDURE — 82607 VITAMIN B-12: CPT

## 2022-11-15 ENCOUNTER — APPOINTMENT (OUTPATIENT)
Dept: LAB | Facility: HOSPITAL | Age: 67
End: 2022-11-15
Attending: INTERNAL MEDICINE
Payer: MEDICARE

## 2022-11-15 DIAGNOSIS — N18.32 CHRONIC KIDNEY DISEASE (CKD) STAGE G3B/A1, MODERATELY DECREASED GLOMERULAR FILTRATION RATE (GFR) BETWEEN 30-44 ML/MIN/1.73 SQUARE METER AND ALBUMINURIA CREATININE RATIO LESS THAN 30 MG/G: Primary | ICD-10-CM

## 2022-11-15 DIAGNOSIS — E21.1 SECONDARY HYPERPARATHYROIDISM, NON-RENAL: ICD-10-CM

## 2022-11-15 DIAGNOSIS — D50.8 IRON DEFICIENCY ANEMIA SECONDARY TO INADEQUATE DIETARY IRON INTAKE: ICD-10-CM

## 2022-11-15 DIAGNOSIS — J44.9 CHRONIC OBSTRUCTIVE PULMONARY DISEASE, UNSPECIFIED COPD TYPE: ICD-10-CM

## 2022-11-15 LAB
ALBUMIN SERPL-MCNC: 3.8 GM/DL (ref 3.4–4.8)
ALBUMIN/GLOB SERPL: 1.2 RATIO (ref 1.1–2)
ALP SERPL-CCNC: 77 UNIT/L (ref 40–150)
ALT SERPL-CCNC: 29 UNIT/L (ref 0–55)
AST SERPL-CCNC: 32 UNIT/L (ref 5–34)
BILIRUBIN DIRECT+TOT PNL SERPL-MCNC: 0.5 MG/DL
BUN SERPL-MCNC: 20.9 MG/DL (ref 9.8–20.1)
CALCIUM SERPL-MCNC: 9.4 MG/DL (ref 8.4–10.2)
CHLORIDE SERPL-SCNC: 101 MMOL/L (ref 98–107)
CO2 SERPL-SCNC: 31 MMOL/L (ref 23–31)
CREAT SERPL-MCNC: 1.18 MG/DL (ref 0.55–1.02)
GFR SERPLBLD CREATININE-BSD FMLA CKD-EPI: 50 MLS/MIN/1.73/M2
GLOBULIN SER-MCNC: 3.1 GM/DL (ref 2.4–3.5)
GLUCOSE SERPL-MCNC: 80 MG/DL (ref 82–115)
HCT VFR BLD AUTO: 38.1 % (ref 37–47)
HGB BLD-MCNC: 12.3 GM/DL (ref 12–16)
IRON SATN MFR SERPL: 27 % (ref 20–50)
IRON SERPL-MCNC: 80 UG/DL (ref 50–170)
PHOSPHATE SERPL-MCNC: 3.3 MG/DL (ref 2.3–4.7)
POTASSIUM SERPL-SCNC: 4 MMOL/L (ref 3.5–5.1)
PROT SERPL-MCNC: 6.9 GM/DL (ref 5.8–7.6)
PTH-INTACT SERPL-MCNC: 67.1 PG/ML (ref 8.7–77)
SODIUM SERPL-SCNC: 141 MMOL/L (ref 136–145)
TIBC SERPL-MCNC: 219 UG/DL (ref 70–310)
TIBC SERPL-MCNC: 299 UG/DL (ref 250–450)
URATE SERPL-MCNC: 7.7 MG/DL (ref 2.6–6)

## 2022-11-15 PROCEDURE — 84550 ASSAY OF BLOOD/URIC ACID: CPT

## 2022-11-15 PROCEDURE — 85014 HEMATOCRIT: CPT

## 2022-11-15 PROCEDURE — 80053 COMPREHEN METABOLIC PANEL: CPT

## 2022-11-15 PROCEDURE — 36415 COLL VENOUS BLD VENIPUNCTURE: CPT

## 2022-11-15 PROCEDURE — 83540 ASSAY OF IRON: CPT

## 2022-11-15 PROCEDURE — 83970 ASSAY OF PARATHORMONE: CPT

## 2022-11-15 PROCEDURE — 84100 ASSAY OF PHOSPHORUS: CPT

## 2023-02-24 ENCOUNTER — LAB VISIT (OUTPATIENT)
Dept: LAB | Facility: HOSPITAL | Age: 68
End: 2023-02-24
Attending: NURSE PRACTITIONER
Payer: MEDICARE

## 2023-02-24 DIAGNOSIS — I51.9 MYXEDEMA HEART DISEASE: Primary | ICD-10-CM

## 2023-02-24 DIAGNOSIS — E55.9 AVITAMINOSIS D: ICD-10-CM

## 2023-02-24 DIAGNOSIS — E03.9 MYXEDEMA HEART DISEASE: Primary | ICD-10-CM

## 2023-02-24 LAB
T3FREE SERPL-MCNC: 2.06 PG/ML (ref 1.57–3.91)
T4 FREE SERPL-MCNC: 1.06 NG/DL (ref 0.7–1.48)
TSH SERPL-ACNC: 8.44 UIU/ML (ref 0.35–4.94)

## 2023-02-24 PROCEDURE — 84481 FREE ASSAY (FT-3): CPT

## 2023-02-24 PROCEDURE — 84591 ASSAY OF NOS VITAMIN: CPT

## 2023-02-24 PROCEDURE — 36415 COLL VENOUS BLD VENIPUNCTURE: CPT

## 2023-02-24 PROCEDURE — 84439 ASSAY OF FREE THYROXINE: CPT

## 2023-02-24 PROCEDURE — 84443 ASSAY THYROID STIM HORMONE: CPT

## 2023-02-28 LAB — MAYO GENERIC ORDERABLE RESULT: NORMAL

## 2023-03-27 ENCOUNTER — LAB VISIT (OUTPATIENT)
Dept: LAB | Facility: HOSPITAL | Age: 68
End: 2023-03-27
Attending: NURSE PRACTITIONER
Payer: MEDICARE

## 2023-03-27 DIAGNOSIS — E55.9 AVITAMINOSIS D: Primary | ICD-10-CM

## 2023-03-27 LAB — DEPRECATED CALCIDIOL+CALCIFEROL SERPL-MC: 109.3 NG/ML (ref 30–80)

## 2023-03-27 PROCEDURE — 82306 VITAMIN D 25 HYDROXY: CPT

## 2023-03-27 PROCEDURE — 36415 COLL VENOUS BLD VENIPUNCTURE: CPT

## 2023-07-13 ENCOUNTER — LAB VISIT (OUTPATIENT)
Dept: LAB | Facility: HOSPITAL | Age: 68
End: 2023-07-13
Attending: INTERNAL MEDICINE
Payer: MEDICARE

## 2023-07-13 DIAGNOSIS — N18.31 STAGE 3A CHRONIC KIDNEY DISEASE: Primary | ICD-10-CM

## 2023-07-13 DIAGNOSIS — E21.1 SECONDARY HYPERPARATHYROIDISM, NOT ELSEWHERE CLASSIFIED: ICD-10-CM

## 2023-07-13 DIAGNOSIS — J44.9 CHRONIC OBSTRUCTIVE PULMONARY DISEASE, UNSPECIFIED COPD TYPE: ICD-10-CM

## 2023-07-13 DIAGNOSIS — I10 ESSENTIAL HYPERTENSION, MALIGNANT: ICD-10-CM

## 2023-07-13 LAB
ALBUMIN SERPL-MCNC: 3.5 G/DL (ref 3.4–4.8)
ALBUMIN/GLOB SERPL: 1 RATIO (ref 1.1–2)
ALP SERPL-CCNC: 185 UNIT/L (ref 40–150)
ALT SERPL-CCNC: 500 UNIT/L (ref 0–55)
AST SERPL-CCNC: 229 UNIT/L (ref 5–34)
BILIRUBIN DIRECT+TOT PNL SERPL-MCNC: 0.6 MG/DL
BUN SERPL-MCNC: 16.4 MG/DL (ref 9.8–20.1)
CALCIUM SERPL-MCNC: 9 MG/DL (ref 8.4–10.2)
CHLORIDE SERPL-SCNC: 105 MMOL/L (ref 98–107)
CO2 SERPL-SCNC: 27 MMOL/L (ref 23–31)
CREAT SERPL-MCNC: 1.06 MG/DL (ref 0.55–1.02)
CREAT UR-MCNC: 86.2 MG/DL (ref 45–106)
GFR SERPLBLD CREATININE-BSD FMLA CKD-EPI: 57 MLS/MIN/1.73/M2
GLOBULIN SER-MCNC: 3.4 GM/DL (ref 2.4–3.5)
GLUCOSE SERPL-MCNC: 80 MG/DL (ref 82–115)
POTASSIUM SERPL-SCNC: 3.7 MMOL/L (ref 3.5–5.1)
PROT SERPL-MCNC: 6.9 GM/DL (ref 5.8–7.6)
PROT UR STRIP-MCNC: 12.2 MG/DL
SODIUM SERPL-SCNC: 142 MMOL/L (ref 136–145)
URINE PROTEIN/CREATININE RATIO (OHS): 0.1

## 2023-07-13 PROCEDURE — 80053 COMPREHEN METABOLIC PANEL: CPT

## 2023-07-13 PROCEDURE — 36415 COLL VENOUS BLD VENIPUNCTURE: CPT

## 2023-07-13 PROCEDURE — 82570 ASSAY OF URINE CREATININE: CPT

## 2023-08-24 ENCOUNTER — APPOINTMENT (OUTPATIENT)
Dept: LAB | Facility: HOSPITAL | Age: 68
End: 2023-08-24
Attending: NURSE PRACTITIONER
Payer: MEDICARE

## 2023-08-24 DIAGNOSIS — N18.31 CHRONIC KIDNEY DISEASE (CKD) STAGE G3A/A1, MODERATELY DECREASED GLOMERULAR FILTRATION RATE (GFR) BETWEEN 45-59 ML/MIN/1.73 SQUARE METER AND ALBUMINURIA CREATININE RATIO LESS THAN 30 MG/G: ICD-10-CM

## 2023-08-24 DIAGNOSIS — Z00.01 ENCOUNTER FOR GENERAL ADULT MEDICAL EXAMINATION WITH ABNORMAL FINDINGS: ICD-10-CM

## 2023-08-24 DIAGNOSIS — K76.89 COMPENSATORY LOBAR HYPERPLASIA OF LIVER: ICD-10-CM

## 2023-08-24 DIAGNOSIS — E21.1 SECONDARY HYPERPARATHYROIDISM, NON-RENAL: ICD-10-CM

## 2023-08-24 DIAGNOSIS — I51.9 MYXEDEMA HEART DISEASE: ICD-10-CM

## 2023-08-24 DIAGNOSIS — I10 ESSENTIAL HYPERTENSION, MALIGNANT: ICD-10-CM

## 2023-08-24 DIAGNOSIS — E55.9 AVITAMINOSIS D: Primary | ICD-10-CM

## 2023-08-24 DIAGNOSIS — J44.89 OBSTRUCTIVE CHRONIC BRONCHITIS WITHOUT EXACERBATION: ICD-10-CM

## 2023-08-24 DIAGNOSIS — E03.9 MYXEDEMA HEART DISEASE: ICD-10-CM

## 2023-08-24 LAB
ALBUMIN SERPL-MCNC: 3.4 G/DL (ref 3.4–4.8)
ALBUMIN/GLOB SERPL: 1.1 RATIO (ref 1.1–2)
ALP SERPL-CCNC: 96 UNIT/L (ref 40–150)
ALT SERPL-CCNC: 18 UNIT/L (ref 0–55)
APPEARANCE UR: CLEAR
AST SERPL-CCNC: 19 UNIT/L (ref 5–34)
BACTERIA #/AREA URNS AUTO: ABNORMAL /HPF
BASOPHILS # BLD AUTO: 0.02 X10(3)/MCL
BASOPHILS NFR BLD AUTO: 0.4 %
BILIRUB SERPL-MCNC: 0.3 MG/DL
BILIRUB UR QL STRIP.AUTO: NEGATIVE
BUN SERPL-MCNC: 22.9 MG/DL (ref 9.8–20.1)
CALCIUM SERPL-MCNC: 9.2 MG/DL (ref 8.4–10.2)
CHLORIDE SERPL-SCNC: 104 MMOL/L (ref 98–107)
CHOLEST SERPL-MCNC: 214 MG/DL
CHOLEST/HDLC SERPL: 3 {RATIO} (ref 0–5)
CO2 SERPL-SCNC: 29 MMOL/L (ref 23–31)
COLOR UR: YELLOW
CREAT SERPL-MCNC: 1.08 MG/DL (ref 0.55–1.02)
CREAT UR-MCNC: 228.7 MG/DL (ref 45–106)
DEPRECATED CALCIDIOL+CALCIFEROL SERPL-MC: 38.5 NG/ML (ref 30–80)
EOSINOPHIL # BLD AUTO: 0.1 X10(3)/MCL (ref 0–0.9)
EOSINOPHIL NFR BLD AUTO: 1.9 %
ERYTHROCYTE [DISTWIDTH] IN BLOOD BY AUTOMATED COUNT: 13.9 % (ref 11.5–17)
GFR SERPLBLD CREATININE-BSD FMLA CKD-EPI: 56 MLS/MIN/1.73/M2
GLOBULIN SER-MCNC: 3.2 GM/DL (ref 2.4–3.5)
GLUCOSE SERPL-MCNC: 96 MG/DL (ref 82–115)
GLUCOSE UR QL STRIP.AUTO: NEGATIVE
HCT VFR BLD AUTO: 38.5 % (ref 37–47)
HDLC SERPL-MCNC: 67 MG/DL (ref 35–60)
HGB BLD-MCNC: 12.4 G/DL (ref 12–16)
IMM GRANULOCYTES # BLD AUTO: 0.01 X10(3)/MCL (ref 0–0.04)
IMM GRANULOCYTES NFR BLD AUTO: 0.2 %
KETONES UR QL STRIP.AUTO: ABNORMAL
LDLC SERPL CALC-MCNC: 135 MG/DL (ref 50–140)
LEUKOCYTE ESTERASE UR QL STRIP.AUTO: ABNORMAL
LYMPHOCYTES # BLD AUTO: 1.12 X10(3)/MCL (ref 0.6–4.6)
LYMPHOCYTES NFR BLD AUTO: 21 %
MCH RBC QN AUTO: 29 PG (ref 27–31)
MCHC RBC AUTO-ENTMCNC: 32.2 G/DL (ref 33–36)
MCV RBC AUTO: 90 FL (ref 80–94)
MONOCYTES # BLD AUTO: 0.46 X10(3)/MCL (ref 0.1–1.3)
MONOCYTES NFR BLD AUTO: 8.6 %
NEUTROPHILS # BLD AUTO: 3.63 X10(3)/MCL (ref 2.1–9.2)
NEUTROPHILS NFR BLD AUTO: 67.9 %
NITRITE UR QL STRIP.AUTO: NEGATIVE
NRBC BLD AUTO-RTO: 0 %
PH UR STRIP.AUTO: 6.5 [PH]
PLATELET # BLD AUTO: 342 X10(3)/MCL (ref 130–400)
PMV BLD AUTO: 9.2 FL (ref 7.4–10.4)
POTASSIUM SERPL-SCNC: 4.2 MMOL/L (ref 3.5–5.1)
PROT SERPL-MCNC: 6.6 GM/DL (ref 5.8–7.6)
PROT UR QL STRIP.AUTO: ABNORMAL
PROT UR STRIP-MCNC: 18.4 MG/DL
RBC # BLD AUTO: 4.28 X10(6)/MCL (ref 4.2–5.4)
RBC #/AREA URNS AUTO: 16 /HPF
RBC UR QL AUTO: ABNORMAL
SODIUM SERPL-SCNC: 142 MMOL/L (ref 136–145)
SP GR UR STRIP.AUTO: 1.03 (ref 1–1.03)
SQUAMOUS #/AREA URNS AUTO: 8 /HPF
T3FREE SERPL-MCNC: 2.93 PG/ML (ref 1.58–3.91)
T4 FREE SERPL-MCNC: 1.36 NG/DL (ref 0.7–1.48)
TRIGL SERPL-MCNC: 60 MG/DL (ref 37–140)
TSH SERPL-ACNC: 0.44 UIU/ML (ref 0.35–4.94)
URATE SERPL-MCNC: 7 MG/DL (ref 2.6–6)
URINE PROTEIN/CREATININE RATIO (OHS): 0.1
UROBILINOGEN UR STRIP-ACNC: 1
VLDLC SERPL CALC-MCNC: 12 MG/DL
WBC # SPEC AUTO: 5.34 X10(3)/MCL (ref 4.5–11.5)
WBC #/AREA URNS AUTO: 5 /HPF

## 2023-08-24 PROCEDURE — 81001 URINALYSIS AUTO W/SCOPE: CPT

## 2023-08-24 PROCEDURE — 82570 ASSAY OF URINE CREATININE: CPT

## 2023-08-24 PROCEDURE — 80061 LIPID PANEL: CPT

## 2023-08-24 PROCEDURE — 82306 VITAMIN D 25 HYDROXY: CPT

## 2023-08-24 PROCEDURE — 84439 ASSAY OF FREE THYROXINE: CPT

## 2023-08-24 PROCEDURE — 85025 COMPLETE CBC W/AUTO DIFF WBC: CPT

## 2023-08-24 PROCEDURE — 36415 COLL VENOUS BLD VENIPUNCTURE: CPT

## 2023-08-24 PROCEDURE — 84550 ASSAY OF BLOOD/URIC ACID: CPT

## 2023-08-24 PROCEDURE — 84443 ASSAY THYROID STIM HORMONE: CPT

## 2023-08-24 PROCEDURE — 80053 COMPREHEN METABOLIC PANEL: CPT

## 2023-08-24 PROCEDURE — 84481 FREE ASSAY (FT-3): CPT

## 2023-10-23 ENCOUNTER — HOSPITAL ENCOUNTER (EMERGENCY)
Facility: HOSPITAL | Age: 68
Discharge: HOME | End: 2023-10-24
Attending: STUDENT IN AN ORGANIZED HEALTH CARE EDUCATION/TRAINING PROGRAM
Payer: MEDICARE

## 2023-10-23 DIAGNOSIS — S52.611A CLOSED DISPLACED FRACTURE OF STYLOID PROCESS OF RIGHT ULNA, INITIAL ENCOUNTER: ICD-10-CM

## 2023-10-23 DIAGNOSIS — S62.101A CLOSED FRACTURE OF RIGHT WRIST, INITIAL ENCOUNTER: Primary | ICD-10-CM

## 2023-10-23 DIAGNOSIS — M25.539 WRIST PAIN: ICD-10-CM

## 2023-10-24 VITALS
DIASTOLIC BLOOD PRESSURE: 78 MMHG | SYSTOLIC BLOOD PRESSURE: 114 MMHG | BODY MASS INDEX: 27.23 KG/M2 | HEART RATE: 82 BPM | HEIGHT: 62 IN | OXYGEN SATURATION: 93 % | TEMPERATURE: 97 F | RESPIRATION RATE: 18 BRPM | WEIGHT: 148 LBS

## 2023-10-24 PROCEDURE — 90471 IMMUNIZATION ADMIN: CPT | Performed by: STUDENT IN AN ORGANIZED HEALTH CARE EDUCATION/TRAINING PROGRAM

## 2023-10-24 PROCEDURE — 90715 TDAP VACCINE 7 YRS/> IM: CPT | Performed by: STUDENT IN AN ORGANIZED HEALTH CARE EDUCATION/TRAINING PROGRAM

## 2023-10-24 PROCEDURE — 99284 EMERGENCY DEPT VISIT MOD MDM: CPT | Mod: 25

## 2023-10-24 PROCEDURE — 25000003 PHARM REV CODE 250: Performed by: STUDENT IN AN ORGANIZED HEALTH CARE EDUCATION/TRAINING PROGRAM

## 2023-10-24 PROCEDURE — 29105 APPLICATION LONG ARM SPLINT: CPT | Mod: RT

## 2023-10-24 PROCEDURE — 63600175 PHARM REV CODE 636 W HCPCS: Performed by: STUDENT IN AN ORGANIZED HEALTH CARE EDUCATION/TRAINING PROGRAM

## 2023-10-24 RX ORDER — HYDROCODONE BITARTRATE AND ACETAMINOPHEN 5; 325 MG/1; MG/1
1 TABLET ORAL EVERY 12 HOURS PRN
Qty: 10 TABLET | Refills: 0 | Status: SHIPPED | OUTPATIENT
Start: 2023-10-24 | End: 2023-10-29

## 2023-10-24 RX ORDER — HYDROCODONE BITARTRATE AND ACETAMINOPHEN 5; 325 MG/1; MG/1
1 TABLET ORAL
Status: COMPLETED | OUTPATIENT
Start: 2023-10-24 | End: 2023-10-24

## 2023-10-24 RX ADMIN — TETANUS TOXOID, REDUCED DIPHTHERIA TOXOID AND ACELLULAR PERTUSSIS VACCINE, ADSORBED 0.5 ML: 5; 2.5; 8; 8; 2.5 SUSPENSION INTRAMUSCULAR at 03:10

## 2023-10-24 RX ADMIN — HYDROCODONE BITARTRATE AND ACETAMINOPHEN 1 TABLET: 5; 325 TABLET ORAL at 02:10

## 2023-10-24 NOTE — ED NOTES
Pt. Dc home with her daughter pt. States understanding of dc and the need for follow up, referral sent to ortho pt. Instructed on splint care and told not to drink or drive while on pain medication. Pt. Wheeled out per EDT

## 2023-10-24 NOTE — DISCHARGE INSTRUCTIONS
Follow up with your primary care physician or provider in 1-2 days.    Please call [phone] for follow-up appointment.      Return to the emergency department if any you have any worsening symptoms, new symptoms, or any other concerns.      Please signup for MyChart as noted below in your paperwork to review all labwork, imaging results, and any other incidental findings from today's visit.       Advice after your visit:  Please return if you have any worsening in your condition or if you have any other concerns.    If you had radiology exams like an XRAY or CT in the emergency Department. Bring these to your primary care doctor and/or specialist for further review of incidental findings.    Please review any LAB WORK from your visit today with your primary care physician.    If you were prescribed OPIATE PAIN MEDICATION - please understand of these medications can be addictive, you may fill less of the prescription was written for, you do not have to take the full prescription.  You may discard what you do not use.  Please seek help if you feel you are having problems with addiction.  Do not drive or operate heavy machinery if you are taking sedating medications.  Do not mix these medications with alcohol.      If you had a SPLINT placed in the emergency department if you have severe pain numbness tingling or discoloration of year digits please remove the splint and return to the emergency department for further evaluation as this may represent a sign of compromise to the nerves or blood vessels due to swelling.

## 2023-10-24 NOTE — ED PROVIDER NOTES
Encounter Date: 10/23/2023    SCRIBE #1 NOTE: I, Beatrice Greenfield, am scribing for, and in the presence of,  Pietro Orlando MD. I have scribed the entire note.       History     Chief Complaint   Patient presents with    Wrist Pain     R wrist pain s/p trip and fall onto wrist. Swelling noted in triage. Takes Eliquis. Denies head injury / LOC.      67 year old female with a hx of COPD, PAD, PVD, and HTN presents to the ED for right wrist pain. Pt states she tripped and fell outside onto the concrete. Pt landed on her right wrist. Pt is experiencing pain to the wrist. Obvious deformity noted. Pt denies any LOC or trauma to the head/neck. Pt takes Eliquis daily.     The history is provided by the patient. A  was used.     Review of patient's allergies indicates:   Allergen Reactions    Oxycodone Hives     Past Medical History:   Diagnosis Date    COPD (chronic obstructive pulmonary disease)     Depression     HLD (hyperlipidemia)     Hypertension     PAD (peripheral artery disease)     PVD (peripheral vascular disease)     RLS (restless legs syndrome)      Past Surgical History:   Procedure Laterality Date    COLONOSCOPY N/A 8/5/2022    Procedure: COLONOSCOPY;  Surgeon: Alex Persaud MD;  Location: [facility] ENDOSCOPY;  Service: Gastroenterology;  Laterality: N/A;    ESOPHAGOGASTRODUODENOSCOPY N/A 8/4/2022    Procedure: EGD;  Surgeon: Alex Persaud MD;  Location: [facility] ENDOSCOPY;  Service: Gastroenterology;  Laterality: N/A;    INSERTION OF TUNNELED CENTRAL VENOUS HEMODIALYSIS CATHETER N/A 7/8/2022    Procedure: INSERTION, CATHETER, CENTRAL VENOUS, HEMODIALYSIS, TUNNELED;  Surgeon: Benoit Soliman III, MD;  Location: [facility] CATH LAB;  Service: Peripheral Vascular;  Laterality: N/A;  TUNNEL CATH INSERTION    INSERTION OF TUNNELED CENTRAL VENOUS HEMODIALYSIS CATHETER N/A 7/12/2022    Procedure: INSERTION, CATHETER, CENTRAL VENOUS, HEMODIALYSIS, TUNNELED;  Surgeon: Dejan Myers MD;   Location: [facility] CATH LAB;  Service: Peripheral Vascular;  Laterality: N/A;  TUNNELED CATH EXCHANGE    INTRALUMINAL GASTROINTESTINAL TRACT IMAGING VIA CAPSULE N/A 8/5/2022    Procedure: IMAGING PROCEDURE, GI TRACT, INTRALUMINAL, VIA CAPSULE;  Surgeon: Alex Persaud MD;  Location: [facility] ENDOSCOPY;  Service: Gastroenterology;  Laterality: N/A;  M2A post op     No family history on file.  Social History     Tobacco Use    Smoking status: Every Day     Types: Cigarettes    Smokeless tobacco: Never     Review of Systems   Constitutional:  Negative for fever.   HENT:  Negative for sore throat.    Eyes:  Negative for visual disturbance.   Respiratory:  Negative for shortness of breath.    Cardiovascular:  Negative for chest pain.   Gastrointestinal:  Negative for abdominal pain.   Genitourinary:  Negative for dysuria.   Musculoskeletal:  Negative for joint swelling.        Right wrist pain   Skin:  Negative for rash.   Neurological:  Negative for weakness.   Psychiatric/Behavioral:  Negative for confusion.        Physical Exam     Initial Vitals [10/23/23 2135]   BP Pulse Resp Temp SpO2   139/88 100 (!) 21 97.6 °F (36.4 °C) (!) 94 %      MAP       --         Physical Exam    Nursing note and vitals reviewed.  Constitutional: She appears well-developed and well-nourished.   HENT:   Head: Normocephalic and atraumatic.   No other abrasions, contusions, lacerations to the scalp or face.  No superior inferior orbital ridge tenderness to palpation.  No zygomatic arch tenderness to palpation.  No epistaxis.  No CSF rhinorrhea.  No septal hematoma.  No intraoral injuries noted.  Normal external ear.  No raccoon eyes.  No Prasad sign.     Eyes: EOM are normal. Pupils are equal, round, and reactive to light.   Neck:   Normal range of motion.  Cardiovascular:  Normal rate, regular rhythm, normal heart sounds and intact distal pulses.           No murmur heard.  Pulmonary/Chest: Breath sounds normal. No respiratory  distress. She has no wheezes. She has no rales.   Abdominal: Abdomen is soft. She exhibits no distension. There is no abdominal tenderness. There is no rebound.   Musculoskeletal:         General: No tenderness or edema. Normal range of motion.      Cervical back: Normal range of motion.      Comments: No C,T or L-spine vertebral point tenderness to palpation, no step-offs, no deformities.  Right upper extremity:  Full range of motion of shoulder, elbow, no deformity or tenderness to palpation. Deformity of R wrist.  Mild tenderness to palpation.  Mild ecchymosis and edema.  Abrasion to right elbow.  Left upper extremity: Full range of motion of shoulder, elbow, wrist, no deformity or tenderness to palpation.  Right lower extremity:  Full range of motion of hip, knee, ankle, no tenderness palpation or deformity noted.  Left lower extremity:  Full range of motion of hip, knee, ankle, no tenderness palpation or deformity noted.       Neurological: She is alert. She has normal strength. No cranial nerve deficit. GCS score is 15. GCS eye subscore is 4. GCS verbal subscore is 5. GCS motor subscore is 6.   Skin: Skin is warm and dry. Capillary refill takes less than 2 seconds. No rash noted. No erythema.   Psychiatric: She has a normal mood and affect.         ED Course   Splint Application    Date/Time: 10/24/2023 3:15 AM    Performed by: Pietro Orlando MD  Authorized by: Pietro Orlando MD  Location details: right wrist  Splint type: sugar tong  Supplies used: Ortho-Glass  Post-procedure: The splinted body part was neurovascularly unchanged following the procedure.  Patient tolerance: Patient tolerated the procedure well with no immediate complications        Labs Reviewed - No data to display       Imaging Results              X-Ray Wrist Complete Right (Final result)  Result time 10/23/23 22:14:37      Final result by Carlos Eduardo Ruvalcaba MD (10/23/23 22:14:37)                   Impression:      Comminuted and mildly  displaced intra-articular fracture of the distal radius.    Mildly displaced ulnar styloid fracture.    Severe degenerative changes of the 1st carpometacarpal joint.      Electronically signed by: Carlos Eduardo Ruvalcaba MD  Date:    10/23/2023  Time:    22:14               Narrative:    EXAMINATION:  Right wrist three views    CLINICAL HISTORY:  Wrist pain    COMPARISON:  None    FINDINGS:  There is comminuted minus displaced intra-articular fracture of the distal radius.  There is mildly displaced ulnar styloid fracture.  There are severe degenerative changes of the 1st carpometacarpal joint.  Mild degenerative changes at the STT joint noted.                                       Medications   HYDROcodone-acetaminophen 5-325 mg per tablet 1 tablet (1 tablet Oral Given 10/24/23 0244)   Tdap (BOOSTRIX) vaccine injection 0.5 mL (0.5 mLs Intramuscular Given 10/24/23 0307)     Medical Decision Making  Problems Addressed:  Closed displaced fracture of styloid process of right ulna, initial encounter: acute illness or injury that poses a threat to life or bodily functions  Closed fracture of right wrist, initial encounter: acute illness or injury that poses a threat to life or bodily functions  Wrist pain: acute illness or injury that poses a threat to life or bodily functions    Amount and/or Complexity of Data Reviewed  Radiology: ordered and independent interpretation performed.    Risk  Prescription drug management.            Scribe Attestation:   Scribe #1: I performed the above scribed service and the documentation accurately describes the services I performed. I attest to the accuracy of the note.    Attending Attestation:           Physician Attestation for Scribe:  Physician Attestation Statement for Scribe #1: I, Pietro Orlando MD, reviewed documentation, as scribed by Beatrice Greenfield in my presence, and it is both accurate and complete.                        Medical Decision Making:   History:   Old Medical Records: I  decided to obtain old medical records.  Old Records Summarized: records from clinic visits, records from previous admission(s) and records from another hospital.       <> Summary of Records: Reviewed old records, history of COPD, hyperlipidemia, PAD  Initial Assessment:   R wrist pain  Differential Diagnosis:   Judging by the patient's chief complaint and pertinent history, the patient has the following possible differential diagnoses, including but not limited to the following.  Some of these are deemed to be lower likelihood and some more likely based on my physical exam and history combined with possible lab work and/or imaging studies.   Please see the pertinent studies, and refer to the HPI.  Some of these diagnoses will take further evaluation to fully rule out, perhaps as an outpatient and the patient was encouraged to follow up when discharged for more comprehensive evaluation.      Contusion, abrasion, fracture  Clinical Tests:   Radiological Study: Ordered and Reviewed  ED Management:    Patient is a 67-year-old female presents to emergency department for fall, on outstretched hand complaining of right wrist pain.  See HPI.  See physical exam.  Edema and ecchymosis tenderness to palpation noted on exam.  Full range of motion of right elbow, shoulder.  No other traumatic injuries noted.  Imaging with evidence of fracture.  Minimally displaced.  Unlikely to be able to reduce any further with sedation and reduction.  Sugar-tong splint applied.  Good cap refill, neurovascularly intact after splint application.  Discussed need for follow-up with orthopedic surgery.  List of orthopedic surgeons provided to the patient.  Contact information given to patient.  All results discussed.  Tetanus has been updated.  Discussed return precautions.  Answered all questions at this time.  Hemodynamically stable for continued outpatient management strict return precautions.  Patient verbalized understanding and agreed to  plan.      Clinical Impression:   Final diagnoses:  [M25.539] Wrist pain  [S62.101A] Closed fracture of right wrist, initial encounter (Primary)  [S52.611A] Closed displaced fracture of styloid process of right ulna, initial encounter        ED Disposition Condition    Discharge Stable          ED Prescriptions       Medication Sig Dispense Start Date End Date Auth. Provider    HYDROcodone-acetaminophen (NORCO) 5-325 mg per tablet Take 1 tablet by mouth every 12 (twelve) hours as needed for Pain. 10 tablet 10/24/2023 10/29/2023 Pietro Orlando MD          Follow-up Information       Follow up With Specialties Details Why Contact Info    Shari Daniels, P Gerontology   [address]  [address]  [facility] [id number] [phone]      Del Payne MD Orthopedic Surgery   [address] [place] [id number] [phone]      Ike Peraza Jr., MD Orthopedic Surgery   [address] [place] [address] [address]  [facility] [id number] [phone]      Escobar Henriquez DO Orthopedic Surgery   [address] [place] [address] [address]  [facility] [id number] [phone]      Lobo Dorsey MD Hand Surgery, Orthopedic Surgery   [address] [place] [id number] [phone]               Pietro Orlando MD  10/24/23 0404

## 2023-10-25 ENCOUNTER — OFFICE VISIT (OUTPATIENT)
Dept: ORTHOPEDICS | Facility: CLINIC | Age: 68
End: 2023-10-25
Payer: MEDICARE

## 2023-10-25 VITALS
BODY MASS INDEX: 27.18 KG/M2 | RESPIRATION RATE: 18 BRPM | SYSTOLIC BLOOD PRESSURE: 96 MMHG | TEMPERATURE: 98 F | DIASTOLIC BLOOD PRESSURE: 67 MMHG | HEART RATE: 105 BPM | HEIGHT: 62 IN | WEIGHT: 147.69 LBS

## 2023-10-25 DIAGNOSIS — S62.101A CLOSED FRACTURE OF RIGHT WRIST, INITIAL ENCOUNTER: ICD-10-CM

## 2023-10-25 PROCEDURE — 99203 OFFICE O/P NEW LOW 30 MIN: CPT | Mod: ,,, | Performed by: STUDENT IN AN ORGANIZED HEALTH CARE EDUCATION/TRAINING PROGRAM

## 2023-10-25 PROCEDURE — 99203 PR OFFICE/OUTPT VISIT, NEW, LEVL III, 30-44 MIN: ICD-10-PCS | Mod: ,,, | Performed by: STUDENT IN AN ORGANIZED HEALTH CARE EDUCATION/TRAINING PROGRAM

## 2023-10-26 NOTE — PROGRESS NOTES
Chief Complaint:  Right wrist injury    Consulting Physician: Pietro Orlando MD    History of present illness:    Patient is a 67-year-old female who presents for initial evaluation of her right wrist injury she fell 2 days ago after tripping and injuring her right wrist.  She was seen in the emergency department where she was placed into a sugar-tong splint for a distal radius fracture and told to follow up me.  She lives with her sister.  She is relatively low demand.  She has a history of COPD, peripheral vascular disease.  She states that she has had carpal tunnel for several years prior to this injury.  She has some numbness in the thumb index and middle fingers but it is at her baseline.    Past Medical History:   Diagnosis Date    COPD (chronic obstructive pulmonary disease)     Depression     HLD (hyperlipidemia)     Hypertension     PAD (peripheral artery disease)     PVD (peripheral vascular disease)     RLS (restless legs syndrome)        Past Surgical History:   Procedure Laterality Date    COLONOSCOPY N/A 8/5/2022    Procedure: COLONOSCOPY;  Surgeon: Alex Persaud MD;  Location: [facility] ENDOSCOPY;  Service: Gastroenterology;  Laterality: N/A;    ESOPHAGOGASTRODUODENOSCOPY N/A 8/4/2022    Procedure: EGD;  Surgeon: Alex Persaud MD;  Location: [facility] ENDOSCOPY;  Service: Gastroenterology;  Laterality: N/A;    INSERTION OF TUNNELED CENTRAL VENOUS HEMODIALYSIS CATHETER N/A 7/8/2022    Procedure: INSERTION, CATHETER, CENTRAL VENOUS, HEMODIALYSIS, TUNNELED;  Surgeon: Benoit Soliman III, MD;  Location: [facility] CATH LAB;  Service: Peripheral Vascular;  Laterality: N/A;  TUNNEL CATH INSERTION    INSERTION OF TUNNELED CENTRAL VENOUS HEMODIALYSIS CATHETER N/A 7/12/2022    Procedure: INSERTION, CATHETER, CENTRAL VENOUS, HEMODIALYSIS, TUNNELED;  Surgeon: Dejan Myers MD;  Location: [facility] CATH LAB;  Service: Peripheral Vascular;  Laterality: N/A;  TUNNELED CATH EXCHANGE    INTRALUMINAL  GASTROINTESTINAL TRACT IMAGING VIA CAPSULE N/A 2022    Procedure: IMAGING PROCEDURE, GI TRACT, INTRALUMINAL, VIA CAPSULE;  Surgeon: Alex Persaud MD;  Location: [facility] ENDOSCOPY;  Service: Gastroenterology;  Laterality: N/A;  M2A post op       Current Outpatient Medications   Medication Sig    buPROPion (WELLBUTRIN SR) 150 MG TBSR 12 hr tablet Take 150 mg by mouth 2 (two) times daily.    DECARA 1,250 mcg (50,000 unit) capsule Take 50,000 Units by mouth every 7 days.    ELIQUIS 5 mg Tab Take 5 mg by mouth 2 (two) times daily.    EUTHYROX 75 mcg tablet Take 75 mcg by mouth once daily.    fluticasone-salmeterol diskus inhaler 250-50 mcg Inhale 1 puff into the lungs.    furosemide (LASIX) 20 MG tablet Take by mouth once daily. As needed for edema    HYDROcodone-acetaminophen (NORCO) 5-325 mg per tablet Take 1 tablet by mouth every 12 (twelve) hours as needed for Pain.    rOPINIRole (REQUIP) 1 MG tablet Take 1 mg by mouth once daily.    rOPINIRole (REQUIP) 3 MG tablet Take 3 mg by mouth once daily.    SPIRIVA WITH HANDIHALER 18 mcg inhalation capsule 1 capsule once daily.    SYMBICORT 160-4.5 mcg/actuation HFAA SMARTSI Puff(s) By Mouth Twice Daily    albuterol (PROVENTIL/VENTOLIN HFA) 90 mcg/actuation inhaler SMARTSI Puff(s) By Mouth Every 4 Hours PRN    bisacodyL (DULCOLAX) 5 mg EC tablet Take 2 tablets (10 mg total) by mouth daily as needed for Constipation.    co-enzyme Q-10 30 mg capsule Take 100 mg by mouth once daily.    diazePAM (VALIUM) 5 MG tablet SMARTSI-2 Tablet(s) By Mouth PRN    lisinopriL (PRINIVIL,ZESTRIL) 20 MG tablet Take 20 mg by mouth once daily.    rosuvastatin (CRESTOR) 40 MG Tab Take 40 mg by mouth nightly.     No current facility-administered medications for this visit.       Review of patient's allergies indicates:   Allergen Reactions    Oxycodone Hives       Family History   Problem Relation Age of Onset    No Known Problems Mother     No Known Problems Father   "      Social History     Socioeconomic History    Marital status: Single   Tobacco Use    Smoking status: Every Day     Types: Cigarettes    Smokeless tobacco: Never       Review of Systems:    Constitution:   Denies chills, fever, and sweats.  HENT:   Denies headaches or blurry vision.  Cardiovascular:  Denies chest pain or irregular heart beat.  Respiratory:   Denies cough or shortness of breath.  Gastrointestinal:  Denies abdominal pain, nausea, or vomiting.  Musculoskeletal:   Denies muscle cramps.  Neurological:   Denies dizziness or focal weakness.  Psychiatric/Behavior: Normal mental status.  Hematology/Lymph:  Denies bleeding problem or easy bruising/bleeding.  Skin:    Denies rash or suspicious lesions.    Examination:    Vital Signs:    Vitals:    10/25/23 1515   BP: 96/67   Pulse: 105   Resp: 18   Temp: 98.4 °F (36.9 °C)   TempSrc: Oral   Weight: 67 kg (147 lb 11.3 oz)   Height: 5' 2" (1.575 m)       Body mass index is 27.02 kg/m².    Constitution:   Well-developed, well nourished patient in no acute distress.  Neurological:   Alert and oriented x 3 and cooperative to examination.     Psychiatric/Behavior: Normal mental status.  Respiratory:   No shortness of breath.  Eyes:    Extraoccular muscles intact  Skin:    No scars, rash or suspicious lesions.    MSK:   Right upper extremity:  No open wounds or rashes.  Sugar-tong splint is in place no irritation around the splint.  EPL and FPL are intact sensation light touch intact in median ulnar radial distribution.  Radial pulse 2 +the hand is warm well perfused    Imaging:   X-ray of the right wrist shows distal radius and distal ulna fractures     Assessment:  Right distal radius fracture, right distal ulna fracture    Plan:  I would a lengthy discussion with her about operative versus nonoperative treatment.  I explained that since she is 67 years old and fairly low demand this can be treated non operatively.  The other option would be to take her to " surgery and fix this with a volar plate and screw construct.  I would a lengthy discussion with her and her brother and we came to the conclusion that this would be better treated non operatively given that she is low demand and 67 years old.  I will see her back in a week and a half to make sure the alignment is still acceptable with another x-ray.  If this fracture shifts and displaces further she may need surgery but for now the plan is for close treatment.    Follow Up:  1 week  Xray at next visit:  Right wrist

## 2023-11-08 ENCOUNTER — HOSPITAL ENCOUNTER (OUTPATIENT)
Dept: RADIOLOGY | Facility: HOSPITAL | Age: 68
Discharge: HOME | End: 2023-11-08
Attending: STUDENT IN AN ORGANIZED HEALTH CARE EDUCATION/TRAINING PROGRAM
Payer: MEDICARE

## 2023-11-08 ENCOUNTER — OFFICE VISIT (OUTPATIENT)
Dept: ORTHOPEDICS | Facility: CLINIC | Age: 68
End: 2023-11-08
Payer: MEDICARE

## 2023-11-08 VITALS
BODY MASS INDEX: 27.05 KG/M2 | WEIGHT: 147 LBS | SYSTOLIC BLOOD PRESSURE: 116 MMHG | HEIGHT: 62 IN | DIASTOLIC BLOOD PRESSURE: 82 MMHG

## 2023-11-08 DIAGNOSIS — M25.531 RIGHT WRIST PAIN: ICD-10-CM

## 2023-11-08 DIAGNOSIS — S62.101D CLOSED FRACTURE OF RIGHT WRIST WITH ROUTINE HEALING, SUBSEQUENT ENCOUNTER: Primary | ICD-10-CM

## 2023-11-08 PROCEDURE — 73110 X-RAY EXAM OF WRIST: CPT | Mod: TC,RT

## 2023-11-08 PROCEDURE — 99213 OFFICE O/P EST LOW 20 MIN: CPT | Mod: S$PBB,,, | Performed by: STUDENT IN AN ORGANIZED HEALTH CARE EDUCATION/TRAINING PROGRAM

## 2023-11-08 PROCEDURE — 99214 OFFICE O/P EST MOD 30 MIN: CPT | Mod: PBBFAC

## 2023-11-08 PROCEDURE — 99213 PR OFFICE/OUTPT VISIT, EST, LEVL III, 20-29 MIN: ICD-10-PCS | Mod: S$PBB,,, | Performed by: STUDENT IN AN ORGANIZED HEALTH CARE EDUCATION/TRAINING PROGRAM

## 2023-11-08 RX ORDER — TRAMADOL HYDROCHLORIDE 50 MG/1
50 TABLET ORAL EVERY 6 HOURS PRN
Qty: 28 TABLET | Refills: 0 | Status: SHIPPED | OUTPATIENT
Start: 2023-11-08 | End: 2023-11-15

## 2023-11-08 NOTE — PROGRESS NOTES
Faculty Attestation: Natalia REBEL Shuklalorinyair  was seen at [facility] in the Orthopaedic Clinic. Patient seen and evaluated at the time of the visit. History of Present Illness, Physical Exam, and Assessment and Plan reviewed. Treatment plan is reasonable and appropriate. Compliance with treatment recommendations is important. Procedure note reviewed. Present for entire procedure with the resident. Patient tolerated procedure well.      Lobo Dorsey MD  Orthopaedic Surgery

## 2023-11-08 NOTE — PROGRESS NOTES
[facility]  Established Patient Office Visit  11/08/2023       Patient ID: Natalia Bower  YOB: 1955  MRN: [id number]    Chief Complaint: Follow-up of the Right Wrist (Patient is here today for follow up for  right wrist.  Has been taking meds; pain: OTC ibuprofen Which has as needed for pain, which does help. )        HPI:  Natalia Bower is a 68 y.o. female with right distal radius fracture sustained on 10/23/2023 after a ground level fall.  The decision after a lengthy discussion with the patient and her family was to be treated nonoperatively.  She is here today for follow-up with repeat x-rays.  Overall doing fair.    Past Medical History:    Past Medical History:   Diagnosis Date    COPD (chronic obstructive pulmonary disease)     Depression     HLD (hyperlipidemia)     Hypertension     PAD (peripheral artery disease)     PVD (peripheral vascular disease)     RLS (restless legs syndrome)      Past Surgical History:   Procedure Laterality Date    COLONOSCOPY N/A 8/5/2022    Procedure: COLONOSCOPY;  Surgeon: Alex Persaud MD;  Location: [facility] ENDOSCOPY;  Service: Gastroenterology;  Laterality: N/A;    ESOPHAGOGASTRODUODENOSCOPY N/A 8/4/2022    Procedure: EGD;  Surgeon: Alex Persaud MD;  Location: [facility] ENDOSCOPY;  Service: Gastroenterology;  Laterality: N/A;    INSERTION OF TUNNELED CENTRAL VENOUS HEMODIALYSIS CATHETER N/A 7/8/2022    Procedure: INSERTION, CATHETER, CENTRAL VENOUS, HEMODIALYSIS, TUNNELED;  Surgeon: Benoit Soliman III, MD;  Location: [facility] CATH LAB;  Service: Peripheral Vascular;  Laterality: N/A;  TUNNEL CATH INSERTION    INSERTION OF TUNNELED CENTRAL VENOUS HEMODIALYSIS CATHETER N/A 7/12/2022    Procedure: INSERTION, CATHETER, CENTRAL VENOUS, HEMODIALYSIS, TUNNELED;  Surgeon: Dejan Myers MD;  Location: [facility] CATH LAB;  Service: Peripheral Vascular;  Laterality: N/A;  TUNNELED CATH EXCHANGE    INTRALUMINAL GASTROINTESTINAL  TRACT IMAGING VIA CAPSULE N/A 2022    Procedure: IMAGING PROCEDURE, GI TRACT, INTRALUMINAL, VIA CAPSULE;  Surgeon: Alex Persaud MD;  Location: [facility] ENDOSCOPY;  Service: Gastroenterology;  Laterality: N/A;  M2A post op     Family History   Problem Relation Age of Onset    No Known Problems Mother     No Known Problems Father      Social History     Socioeconomic History    Marital status: Single   Tobacco Use    Smoking status: Every Day     Types: Cigarettes    Smokeless tobacco: Never     Medication List with Changes/Refills   Current Medications    ALBUTEROL (PROVENTIL/VENTOLIN HFA) 90 MCG/ACTUATION INHALER    SMARTSI Puff(s) By Mouth Every 4 Hours PRN    BISACODYL (DULCOLAX) 5 MG EC TABLET    Take 2 tablets (10 mg total) by mouth daily as needed for Constipation.    BUPROPION (WELLBUTRIN SR) 150 MG TBSR 12 HR TABLET    Take 150 mg by mouth 2 (two) times daily.    CO-ENZYME Q-10 30 MG CAPSULE    Take 100 mg by mouth once daily.    DECARA 1,250 MCG (50,000 UNIT) CAPSULE    Take 50,000 Units by mouth every 7 days.    DIAZEPAM (VALIUM) 5 MG TABLET    SMARTSI-2 Tablet(s) By Mouth PRN    ELIQUIS 5 MG TAB    Take 5 mg by mouth 2 (two) times daily.    EUTHYROX 75 MCG TABLET    Take 75 mcg by mouth once daily.    FLUTICASONE-SALMETEROL DISKUS INHALER 250-50 MCG    Inhale 1 puff into the lungs.    FUROSEMIDE (LASIX) 20 MG TABLET    Take by mouth once daily. As needed for edema    LISINOPRIL (PRINIVIL,ZESTRIL) 20 MG TABLET    Take 20 mg by mouth once daily.    ROPINIROLE (REQUIP) 1 MG TABLET    Take 1 mg by mouth once daily.    ROPINIROLE (REQUIP) 3 MG TABLET    Take 3 mg by mouth once daily.    ROSUVASTATIN (CRESTOR) 40 MG TAB    Take 40 mg by mouth nightly.    SPIRIVA WITH HANDIHALER 18 MCG INHALATION CAPSULE    1 capsule once daily.    SYMBICORT 160-4.5 MCG/ACTUATION HFAA    SMARTSI Puff(s) By Mouth Twice Daily     Review of patient's allergies indicates:   Allergen Reactions    Oxycodone  Hives       Physical Exam:  Right upper extremity  No gross deformity, open wounds, abrasions  TTP over the radius  No significant motion secondary to pain  Motor intact: ain/pin/m/u/r  Nenzel: M/U/R without numbness, tingling, or asymmetry  2+ radial pulse      Imaging:  Independent review  X-ray right wrist demonstrates distal radius and ulnar fractures.  Alignment is maintained compared to prior.  And acceptable tolerances    Assessment and Plan:    Natalia Bower is a 68 y.o. female who is approximately 2 weeks status post injury sustaining a right distal radius and ulna fracture being treated nonoperatively.    -subsequent x-rays demonstrated continued maintenance of alignment thus we will continue her nonoperative treatment course.  We will place patient in a short-arm cast.  -she will see Dr. Dorsey in weeks at his clinic      Howie Ackerman MD  [facility] Orthopaedic Surgery PGY-5          Orders Placed This Encounter    X-Ray Wrist Complete Right

## 2023-11-09 ENCOUNTER — TELEPHONE (OUTPATIENT)
Dept: ORTHOPEDICS | Facility: CLINIC | Age: 68
End: 2023-11-09
Payer: MEDICARE

## 2023-11-09 NOTE — TELEPHONE ENCOUNTER
Patient is asking if she is able to drive with her cast on?   I explained that she should not drive as long as she is taking pain medication for sure.        LLE/BRUISING

## 2023-11-14 NOTE — TELEPHONE ENCOUNTER
I called the patient to inform her that once she is no longer taking pain medication she is able to drive. Patient voiced a clear understanding.

## 2023-11-29 ENCOUNTER — OFFICE VISIT (OUTPATIENT)
Dept: ORTHOPEDICS | Facility: CLINIC | Age: 68
End: 2023-11-29
Payer: MEDICARE

## 2023-11-29 ENCOUNTER — HOSPITAL ENCOUNTER (OUTPATIENT)
Dept: RADIOLOGY | Facility: CLINIC | Age: 68
Discharge: HOME | End: 2023-11-29
Attending: STUDENT IN AN ORGANIZED HEALTH CARE EDUCATION/TRAINING PROGRAM
Payer: MEDICARE

## 2023-11-29 DIAGNOSIS — S62.101D CLOSED FRACTURE OF RIGHT WRIST WITH ROUTINE HEALING, SUBSEQUENT ENCOUNTER: Primary | ICD-10-CM

## 2023-11-29 DIAGNOSIS — S62.101D CLOSED FRACTURE OF RIGHT WRIST WITH ROUTINE HEALING, SUBSEQUENT ENCOUNTER: ICD-10-CM

## 2023-11-29 PROCEDURE — 73110 XR WRIST COMPLETE 3 VIEWS RIGHT: ICD-10-PCS | Mod: RT,,, | Performed by: STUDENT IN AN ORGANIZED HEALTH CARE EDUCATION/TRAINING PROGRAM

## 2023-11-29 PROCEDURE — 99213 OFFICE O/P EST LOW 20 MIN: CPT | Mod: ,,, | Performed by: STUDENT IN AN ORGANIZED HEALTH CARE EDUCATION/TRAINING PROGRAM

## 2023-11-29 PROCEDURE — 73110 X-RAY EXAM OF WRIST: CPT | Mod: RT,,, | Performed by: STUDENT IN AN ORGANIZED HEALTH CARE EDUCATION/TRAINING PROGRAM

## 2023-11-29 PROCEDURE — 99213 PR OFFICE/OUTPT VISIT, EST, LEVL III, 20-29 MIN: ICD-10-PCS | Mod: ,,, | Performed by: STUDENT IN AN ORGANIZED HEALTH CARE EDUCATION/TRAINING PROGRAM

## 2023-11-29 RX ORDER — ATORVASTATIN CALCIUM 40 MG/1
40 TABLET, FILM COATED ORAL
COMMUNITY
Start: 2023-11-22

## 2023-11-29 RX ORDER — EZETIMIBE 10 MG/1
10 TABLET ORAL
COMMUNITY
Start: 2023-11-22

## 2023-11-29 NOTE — PROGRESS NOTES
Chief Complaint:  Right wrist injury    Consulting Physician: No ref. provider found    History of present illness:    Patient is a 67-year-old female who presents for a right distal radius fracture we are treating non operatively.  The date of injury was 10/23/2023.  She has been immobilized last 5 weeks.  She has no pain in the wrist but it feels stiff.  No numbness or tingling into the fingertips.  She is able to flex and extend her thumb.    Past Medical History:   Diagnosis Date    COPD (chronic obstructive pulmonary disease)     Depression     HLD (hyperlipidemia)     Hypertension     PAD (peripheral artery disease)     PVD (peripheral vascular disease)     RLS (restless legs syndrome)        Past Surgical History:   Procedure Laterality Date    COLONOSCOPY N/A 8/5/2022    Procedure: COLONOSCOPY;  Surgeon: Alex Persaud MD;  Location: [facility] ENDOSCOPY;  Service: Gastroenterology;  Laterality: N/A;    ESOPHAGOGASTRODUODENOSCOPY N/A 8/4/2022    Procedure: EGD;  Surgeon: Alex Persaud MD;  Location: [facility] ENDOSCOPY;  Service: Gastroenterology;  Laterality: N/A;    INSERTION OF TUNNELED CENTRAL VENOUS HEMODIALYSIS CATHETER N/A 7/8/2022    Procedure: INSERTION, CATHETER, CENTRAL VENOUS, HEMODIALYSIS, TUNNELED;  Surgeon: Benoit Soliman III, MD;  Location: [facility] CATH LAB;  Service: Peripheral Vascular;  Laterality: N/A;  TUNNEL CATH INSERTION    INSERTION OF TUNNELED CENTRAL VENOUS HEMODIALYSIS CATHETER N/A 7/12/2022    Procedure: INSERTION, CATHETER, CENTRAL VENOUS, HEMODIALYSIS, TUNNELED;  Surgeon: Dejan Myers MD;  Location: [facility] CATH LAB;  Service: Peripheral Vascular;  Laterality: N/A;  TUNNELED CATH EXCHANGE    INTRALUMINAL GASTROINTESTINAL TRACT IMAGING VIA CAPSULE N/A 8/5/2022    Procedure: IMAGING PROCEDURE, GI TRACT, INTRALUMINAL, VIA CAPSULE;  Surgeon: Alex Persaud MD;  Location: [facility] ENDOSCOPY;  Service: Gastroenterology;  Laterality: N/A;  M2A post op       Current  Outpatient Medications   Medication Sig    albuterol (PROVENTIL/VENTOLIN HFA) 90 mcg/actuation inhaler SMARTSI Puff(s) By Mouth Every 4 Hours PRN    atorvastatin (LIPITOR) 40 MG tablet Take 40 mg by mouth.    bisacodyL (DULCOLAX) 5 mg EC tablet Take 2 tablets (10 mg total) by mouth daily as needed for Constipation.    buPROPion (WELLBUTRIN SR) 150 MG TBSR 12 hr tablet Take 150 mg by mouth 2 (two) times daily.    co-enzyme Q-10 30 mg capsule Take 100 mg by mouth once daily.    DECARA 1,250 mcg (50,000 unit) capsule Take 50,000 Units by mouth every 7 days.    diazePAM (VALIUM) 5 MG tablet SMARTSI-2 Tablet(s) By Mouth PRN    ELIQUIS 5 mg Tab Take 5 mg by mouth 2 (two) times daily.    EUTHYROX 75 mcg tablet Take 75 mcg by mouth once daily.    ezetimibe (ZETIA) 10 mg tablet Take 10 mg by mouth.    fluticasone-salmeterol diskus inhaler 250-50 mcg Inhale 1 puff into the lungs.    furosemide (LASIX) 20 MG tablet Take by mouth once daily. As needed for edema    lisinopriL (PRINIVIL,ZESTRIL) 20 MG tablet Take 20 mg by mouth once daily.    rOPINIRole (REQUIP) 1 MG tablet Take 1 mg by mouth once daily.    rOPINIRole (REQUIP) 3 MG tablet Take 3 mg by mouth once daily.    rosuvastatin (CRESTOR) 40 MG Tab Take 40 mg by mouth nightly.    SPIRIVA WITH HANDIHALER 18 mcg inhalation capsule 1 capsule once daily.    SYMBICORT 160-4.5 mcg/actuation HFAA SMARTSI Puff(s) By Mouth Twice Daily     No current facility-administered medications for this visit.       Review of patient's allergies indicates:   Allergen Reactions    Oxycodone Hives       Family History   Problem Relation Age of Onset    No Known Problems Mother     No Known Problems Father        Social History     Socioeconomic History    Marital status: Single   Tobacco Use    Smoking status: Every Day     Types: Cigarettes    Smokeless tobacco: Never   Substance and Sexual Activity    Alcohol use: Yes     Comment: soc.       Review of Systems:    Constitution:   Denies  chills, fever, and sweats.  HENT:   Denies headaches or blurry vision.  Cardiovascular:  Denies chest pain or irregular heart beat.  Respiratory:   Denies cough or shortness of breath.  Gastrointestinal:  Denies abdominal pain, nausea, or vomiting.  Musculoskeletal:   Denies muscle cramps.  Neurological:   Denies dizziness or focal weakness.  Psychiatric/Behavior: Normal mental status.  Hematology/Lymph:  Denies bleeding problem or easy bruising/bleeding.  Skin:    Denies rash or suspicious lesions.    Examination:    Vital Signs:    There were no vitals filed for this visit.      There is no height or weight on file to calculate BMI.    Constitution:   Well-developed, well nourished patient in no acute distress.  Neurological:   Alert and oriented x 3 and cooperative to examination.     Psychiatric/Behavior: Normal mental status.  Respiratory:   No shortness of breath.  Eyes:    Extraoccular muscles intact  Skin:    No scars, rash or suspicious lesions.    MSK:   Right upper extremity:  No open wounds or rashes.  No tenderness to palpation around the distal radius.  EPL and FPL are intact.  She is able to make a full fist.  Wrist motion is supple but stiff from immobilization.  sensation light touch intact in median ulnar radial distribution.  Radial pulse 2 +the hand is warm well perfused    Imaging:   X-ray of the right wrist shows distal radius and distal ulna fractures healing in acceptable alignment for a 68-year-old patient     Assessment:  Right distal radius fracture, right distal ulna fracture    Plan:  I will take her out of the cast today.  I will give her a removable brace that she should keep on at all times.  She can start working on her range of motion.  No lifting pushing pulling greater than 1 lb.  I will give her a referral to physical therapy for wrist and digit range of motion.  I will see her back in 4-6 weeks to see what kind of progress she has made with therapy.    Follow Up:  4-6  weeks  Xray at next visit:  Right wrist

## 2023-12-13 ENCOUNTER — LAB VISIT (OUTPATIENT)
Dept: LAB | Facility: HOSPITAL | Age: 68
End: 2023-12-13
Attending: NURSE PRACTITIONER
Payer: MEDICARE

## 2023-12-13 DIAGNOSIS — E03.9 HYPOTHYROIDISM, UNSPECIFIED TYPE: ICD-10-CM

## 2023-12-13 DIAGNOSIS — E55.9 VITAMIN D DEFICIENCY: Primary | ICD-10-CM

## 2023-12-13 LAB
DEPRECATED CALCIDIOL+CALCIFEROL SERPL-MC: 89.1 NG/ML (ref 30–80)
T3FREE SERPL-MCNC: 2.79 PG/ML (ref 1.58–3.91)
T4 FREE SERPL-MCNC: 2.46 NG/DL (ref 0.7–1.48)
TSH SERPL-ACNC: 4.27 UIU/ML (ref 0.35–4.94)

## 2023-12-13 PROCEDURE — 36415 COLL VENOUS BLD VENIPUNCTURE: CPT

## 2023-12-13 PROCEDURE — 82306 VITAMIN D 25 HYDROXY: CPT

## 2023-12-13 PROCEDURE — 84443 ASSAY THYROID STIM HORMONE: CPT

## 2023-12-13 PROCEDURE — 84481 FREE ASSAY (FT-3): CPT

## 2023-12-13 PROCEDURE — 84439 ASSAY OF FREE THYROXINE: CPT

## 2023-12-20 ENCOUNTER — HOSPITAL ENCOUNTER (EMERGENCY)
Facility: HOSPITAL | Age: 68
Discharge: HOME | End: 2023-12-21
Attending: EMERGENCY MEDICINE
Payer: MEDICARE

## 2023-12-20 VITALS
DIASTOLIC BLOOD PRESSURE: 64 MMHG | HEART RATE: 73 BPM | SYSTOLIC BLOOD PRESSURE: 98 MMHG | TEMPERATURE: 98 F | OXYGEN SATURATION: 93 % | WEIGHT: 147 LBS | BODY MASS INDEX: 26.89 KG/M2 | RESPIRATION RATE: 16 BRPM

## 2023-12-20 DIAGNOSIS — N30.00 ACUTE CYSTITIS WITHOUT HEMATURIA: ICD-10-CM

## 2023-12-20 DIAGNOSIS — R53.1 WEAKNESS: Primary | ICD-10-CM

## 2023-12-20 DIAGNOSIS — R42 DIZZINESS: ICD-10-CM

## 2023-12-20 DIAGNOSIS — E86.0 ACUTE DEHYDRATION: ICD-10-CM

## 2023-12-20 DIAGNOSIS — I95.9 HYPOTENSION, UNSPECIFIED HYPOTENSION TYPE: ICD-10-CM

## 2023-12-20 LAB
ALBUMIN SERPL-MCNC: 3.6 G/DL (ref 3.4–4.8)
ALBUMIN/GLOB SERPL: 0.9 RATIO (ref 1.1–2)
ALP SERPL-CCNC: 111 UNIT/L (ref 40–150)
ALT SERPL-CCNC: 22 UNIT/L (ref 0–55)
APPEARANCE UR: CLEAR
AST SERPL-CCNC: 27 UNIT/L (ref 5–34)
BACTERIA #/AREA URNS AUTO: ABNORMAL /HPF
BASOPHILS # BLD AUTO: 0.03 X10(3)/MCL
BASOPHILS NFR BLD AUTO: 0.4 %
BILIRUB SERPL-MCNC: 0.5 MG/DL
BILIRUB UR QL STRIP.AUTO: NEGATIVE
BUN SERPL-MCNC: 16.5 MG/DL (ref 9.8–20.1)
CALCIUM SERPL-MCNC: 9.4 MG/DL (ref 8.4–10.2)
CHLORIDE SERPL-SCNC: 104 MMOL/L (ref 98–107)
CO2 SERPL-SCNC: 31 MMOL/L (ref 23–31)
COLOR UR AUTO: ABNORMAL
CREAT SERPL-MCNC: 1.28 MG/DL (ref 0.55–1.02)
EOSINOPHIL # BLD AUTO: 0.16 X10(3)/MCL (ref 0–0.9)
EOSINOPHIL NFR BLD AUTO: 2.3 %
ERYTHROCYTE [DISTWIDTH] IN BLOOD BY AUTOMATED COUNT: 14.6 % (ref 11.5–17)
GFR SERPLBLD CREATININE-BSD FMLA CKD-EPI: 45 MLS/MIN/1.73/M2
GLOBULIN SER-MCNC: 4 GM/DL (ref 2.4–3.5)
GLUCOSE SERPL-MCNC: 84 MG/DL (ref 82–115)
GLUCOSE UR QL STRIP.AUTO: NORMAL
HCT VFR BLD AUTO: 41.2 % (ref 37–47)
HGB BLD-MCNC: 13.1 G/DL (ref 12–16)
HYALINE CASTS #/AREA URNS LPF: ABNORMAL /LPF
IMM GRANULOCYTES # BLD AUTO: 0.02 X10(3)/MCL (ref 0–0.04)
IMM GRANULOCYTES NFR BLD AUTO: 0.3 %
INR PPP: 1.1
KETONES UR QL STRIP.AUTO: NEGATIVE
LEUKOCYTE ESTERASE UR QL STRIP.AUTO: 75
LYMPHOCYTES # BLD AUTO: 1.47 X10(3)/MCL (ref 0.6–4.6)
LYMPHOCYTES NFR BLD AUTO: 21.2 %
MCH RBC QN AUTO: 28.7 PG (ref 27–31)
MCHC RBC AUTO-ENTMCNC: 31.8 G/DL (ref 33–36)
MCV RBC AUTO: 90.4 FL (ref 80–94)
MONOCYTES # BLD AUTO: 0.57 X10(3)/MCL (ref 0.1–1.3)
MONOCYTES NFR BLD AUTO: 8.2 %
MUCOUS THREADS URNS QL MICRO: ABNORMAL /LPF
NEUTROPHILS # BLD AUTO: 4.68 X10(3)/MCL (ref 2.1–9.2)
NEUTROPHILS NFR BLD AUTO: 67.6 %
NITRITE UR QL STRIP.AUTO: NEGATIVE
NRBC BLD AUTO-RTO: 0 %
PH UR STRIP.AUTO: 5.5 [PH]
PLATELET # BLD AUTO: 304 X10(3)/MCL (ref 130–400)
PMV BLD AUTO: 9.1 FL (ref 7.4–10.4)
POTASSIUM SERPL-SCNC: 4.4 MMOL/L (ref 3.5–5.1)
PROT SERPL-MCNC: 7.6 GM/DL (ref 5.8–7.6)
PROT UR QL STRIP.AUTO: NEGATIVE
PROTHROMBIN TIME: 13.8 SECONDS (ref 12.5–14.5)
RBC # BLD AUTO: 4.56 X10(6)/MCL (ref 4.2–5.4)
RBC #/AREA URNS AUTO: ABNORMAL /HPF
RBC UR QL AUTO: ABNORMAL
SODIUM SERPL-SCNC: 142 MMOL/L (ref 136–145)
SP GR UR STRIP.AUTO: 1.02 (ref 1–1.03)
SQUAMOUS #/AREA URNS LPF: ABNORMAL /HPF
TROPONIN I SERPL-MCNC: 0.01 NG/ML (ref 0–0.04)
UROBILINOGEN UR STRIP-ACNC: NORMAL
WBC # SPEC AUTO: 6.93 X10(3)/MCL (ref 4.5–11.5)
WBC #/AREA URNS AUTO: ABNORMAL /HPF

## 2023-12-20 PROCEDURE — 81001 URINALYSIS AUTO W/SCOPE: CPT

## 2023-12-20 PROCEDURE — 25000003 PHARM REV CODE 250

## 2023-12-20 PROCEDURE — 85610 PROTHROMBIN TIME: CPT

## 2023-12-20 PROCEDURE — 96360 HYDRATION IV INFUSION INIT: CPT

## 2023-12-20 PROCEDURE — 84484 ASSAY OF TROPONIN QUANT: CPT

## 2023-12-20 PROCEDURE — 85025 COMPLETE CBC W/AUTO DIFF WBC: CPT

## 2023-12-20 PROCEDURE — 80053 COMPREHEN METABOLIC PANEL: CPT

## 2023-12-20 PROCEDURE — 93005 ELECTROCARDIOGRAM TRACING: CPT

## 2023-12-20 PROCEDURE — 99285 EMERGENCY DEPT VISIT HI MDM: CPT | Mod: 25

## 2023-12-20 PROCEDURE — 82962 GLUCOSE BLOOD TEST: CPT

## 2023-12-20 RX ORDER — CEFDINIR 300 MG/1
300 CAPSULE ORAL 2 TIMES DAILY
Qty: 14 CAPSULE | Refills: 0 | Status: SHIPPED | OUTPATIENT
Start: 2023-12-20 | End: 2023-12-27

## 2023-12-20 RX ADMIN — SODIUM CHLORIDE 1000 ML: 9 INJECTION, SOLUTION INTRAVENOUS at 10:12

## 2023-12-20 NOTE — FIRST PROVIDER EVALUATION
Medical screening examination initiated.  I have conducted a focused provider triage encounter, findings are as follows:    Brief history of present illness:  68 year old female sent to ER from PCP due to low blood pressure and generalized weakness. States that she went in for routine labs this morning and her blood pressure was low. States that she had some dizziness as well     Vitals:    12/20/23 1538   BP: 105/71   Pulse: 91   Resp: 20   Temp: 98.3 °F (36.8 °C)   TempSrc: Oral   SpO2: (!) 94%   Weight: 66.7 kg (147 lb)       Pertinent physical exam:  awake and alert, nad    Brief workup plan:  Labs, CXR, IVFs    Preliminary workup initiated; this workup will be continued and followed by the physician or advanced practice provider that is assigned to the patient when roomed.

## 2023-12-21 LAB — POCT GLUCOSE: 138 MG/DL (ref 70–110)

## 2023-12-21 NOTE — ED PROVIDER NOTES
Encounter Date: 12/20/2023       History     Chief Complaint   Patient presents with    Weakness     Pt sent by PCP for IV fluids due to dehydration. Pt reporting weakness/dizziness. BP low/tachycardic at PCP, however daughter reports that is normal. Sees Dr Kaplan. Denies CP/SOB. Hx afib       Patient is a 68-year-old female referred to the ER by her primary care physician secondary to low blood pressure prior to arrival.  Patient states she does not drink much water but likes to drink Coke.  Patient denies any fever chills.  Patient denies nausea vomiting or diarrhea.  Patient does describe some sensation of lightheadedness when she stands for the last day or so.  Patient states her systolic blood pressure normally runs around 100.  Patient denies cough, fever, or chills.  She also denies shortness of breath.  Patient does have history of COPD.      Review of patient's allergies indicates:   Allergen Reactions    Oxycodone Hives     Past Medical History:   Diagnosis Date    COPD (chronic obstructive pulmonary disease)     Depression     HLD (hyperlipidemia)     Hypertension     PAD (peripheral artery disease)     PVD (peripheral vascular disease)     RLS (restless legs syndrome)      Past Surgical History:   Procedure Laterality Date    COLONOSCOPY N/A 8/5/2022    Procedure: COLONOSCOPY;  Surgeon: Alex Persaud MD;  Location: [facility] ENDOSCOPY;  Service: Gastroenterology;  Laterality: N/A;    ESOPHAGOGASTRODUODENOSCOPY N/A 8/4/2022    Procedure: EGD;  Surgeon: Alex Persaud MD;  Location: [facility] ENDOSCOPY;  Service: Gastroenterology;  Laterality: N/A;    INSERTION OF TUNNELED CENTRAL VENOUS HEMODIALYSIS CATHETER N/A 7/8/2022    Procedure: INSERTION, CATHETER, CENTRAL VENOUS, HEMODIALYSIS, TUNNELED;  Surgeon: Benoit Soliman III, MD;  Location: [facility] CATH LAB;  Service: Peripheral Vascular;  Laterality: N/A;  TUNNEL CATH INSERTION    INSERTION OF TUNNELED CENTRAL VENOUS HEMODIALYSIS CATHETER N/A  7/12/2022    Procedure: INSERTION, CATHETER, CENTRAL VENOUS, HEMODIALYSIS, TUNNELED;  Surgeon: Dejan Myers MD;  Location: [facility] CATH LAB;  Service: Peripheral Vascular;  Laterality: N/A;  TUNNELED CATH EXCHANGE    INTRALUMINAL GASTROINTESTINAL TRACT IMAGING VIA CAPSULE N/A 8/5/2022    Procedure: IMAGING PROCEDURE, GI TRACT, INTRALUMINAL, VIA CAPSULE;  Surgeon: Alex Persaud MD;  Location: [facility] ENDOSCOPY;  Service: Gastroenterology;  Laterality: N/A;  M2A post op     Family History   Problem Relation Age of Onset    No Known Problems Mother     No Known Problems Father      Social History     Tobacco Use    Smoking status: Every Day     Types: Cigarettes    Smokeless tobacco: Never   Substance Use Topics    Alcohol use: Yes     Comment: soc.     Review of Systems   Constitutional: Negative.    HENT: Negative.     Respiratory: Negative.     Cardiovascular: Negative.    Gastrointestinal: Negative.    Genitourinary: Negative.    Musculoskeletal: Negative.    Neurological:  Positive for light-headedness. Negative for dizziness, seizures, syncope, facial asymmetry, speech difficulty, weakness, numbness and headaches.   Psychiatric/Behavioral: Negative.         Physical Exam     Initial Vitals [12/20/23 1538]   BP Pulse Resp Temp SpO2   105/71 91 20 98.3 °F (36.8 °C) (!) 94 %      MAP       --         Physical Exam    Nursing note and vitals reviewed.  Constitutional: She appears well-developed and well-nourished.    Patient is alert and oriented and in no apparent distress.   HENT:     Slightly dry mucous membranes   Neck: Neck supple.   Normal range of motion.  Cardiovascular:  Normal rate, regular rhythm and normal heart sounds.           Pulmonary/Chest: Breath sounds normal. No respiratory distress. She has no rhonchi.   Abdominal: Abdomen is soft. There is no abdominal tenderness.   Musculoskeletal:         General: Normal range of motion.      Cervical back: Normal range of motion and neck supple.      Neurological: She is oriented to person, place, and time. She has normal strength.   Skin: Skin is warm.   Psychiatric: She has a normal mood and affect. Her behavior is normal. Thought content normal.         ED Course   Procedures  Labs Reviewed   COMPREHENSIVE METABOLIC PANEL - Abnormal; Notable for the following components:       Result Value    Creatinine 1.28 (*)     Globulin 4.0 (*)     Albumin/Globulin Ratio 0.9 (*)     All other components within normal limits   URINALYSIS, REFLEX TO URINE CULTURE - Abnormal; Notable for the following components:    Blood, UA 1+ (*)     Leukocyte Esterase, UA 75 (*)     WBC, UA 6-10 (*)     Bacteria, UA Few (*)     Mucous, UA Trace (*)     Hyaline Casts, UA 0-2 (*)     RBC, UA 6-10 (*)     All other components within normal limits   CBC WITH DIFFERENTIAL - Abnormal; Notable for the following components:    MCHC 31.8 (*)     All other components within normal limits   PROTIME-INR - Normal   TROPONIN I - Normal   CBC W/ AUTO DIFFERENTIAL    Narrative:     The following orders were created for panel order CBC auto differential.  Procedure                               Abnormality         Status                     ---------                               -----------         ------                     CBC with Differential[[id number]]       Abnormal            Final result                 Please view results for these tests on the individual orders.     EKG Readings: (Independently Interpreted)   Initial Reading: No STEMI. Rhythm: Normal Sinus Rhythm. Heart Rate: 88. Ectopy: No Ectopy. Conduction: Normal. ST Segments: Normal ST Segments. T Waves: Normal. Axis: Normal.     ECG Results              EKG 12-lead (Final result)  Result time 12/20/23 18:10:31      Final result by Interface, Lab In [facility] (12/20/23 18:10:31)                   Narrative:    Test Reason : R42,    Vent. Rate : 088 BPM     Atrial Rate : 088 BPM     P-R Int : 154 ms          QRS Dur : 072 ms      QT  Int : 378 ms       P-R-T Axes : 074 046 074 degrees     QTc Int : 457 ms    Normal sinus rhythm  Low voltage QRS  Abnormal ECG  When compared with ECG of 03-AUG-2022 14:34,  No significant change was found  Confirmed by Obdulio Hernandez MD ([id number]) on 12/20/2023 6:10:25 PM    Referred By: GAURANG   SELF           Confirmed By:Obdulio Hernandez MD                                  Imaging Results              X-Ray Chest AP Portable (Final result)  Result time 12/20/23 16:27:53      Final result by Ilya León MD (12/20/23 16:27:53)                   Impression:      No acute pulmonary process identified.      Electronically signed by: Ilya León  Date:    12/20/2023  Time:    16:27               Narrative:    EXAMINATION:  XR CHEST AP PORTABLE    CLINICAL HISTORY:  dizziness;    TECHNIQUE:  Frontal view(s) of the chest.    COMPARISON:  Radiography 07/04/2022    FINDINGS:  Normal cardiac silhouette.  The lungs are well-inflated.  No consolidation identified.  No significant pleural effusion or discernible pneumothorax.                                       Medications   sodium chloride 0.9% bolus 1,000 mL 1,000 mL (0 mLs Intravenous Stopped 12/20/23 2307)     Medical Decision Making    As per HPI.  Differential diagnosis:  Dehydration, UTI, electrolyte abnormality    Amount and/or Complexity of Data Reviewed  Labs: ordered.     Details:   Patient has a mild UTI, all labs are stable otherwise               ED Course as of 12/20/23 2342   Wed Dec 20, 2023   2339  Patient received a L of fluids, she ambulated afterwards and denies any weakness or lightheadedness.  No dizziness. [KG]   2339   Patient states she is ready be discharged to home. [KG]      ED Course User Index  [KG] Teddy Miranda MD                           Clinical Impression:  Final diagnoses:  [R42] Dizziness  [R53.1] Weakness (Primary)  [I95.9] Hypotension, unspecified hypotension type  [E86.0] Acute dehydration  [N30.00] Acute cystitis  without hematuria          ED Disposition Condition    Discharge Stable          ED Prescriptions       Medication Sig Dispense Start Date End Date Auth. Provider    cefdinir (OMNICEF) 300 MG capsule Take 1 capsule (300 mg total) by mouth 2 (two) times daily. for 7 days 14 capsule 12/20/2023 12/27/2023 Teddy Miranda MD          Follow-up Information       Follow up With Specialties Details Why Contact Info    Shari Daniels FNP Gerontology In 2 days  [address]  [address]  [place] [address] [phone]      [facility]  Emergency Dept Emergency Medicine  As needed, If symptoms worsen [address] [address] [phone]             Teddy Miranda MD  12/20/23 2576

## 2024-01-10 ENCOUNTER — HOSPITAL ENCOUNTER (OUTPATIENT)
Dept: RADIOLOGY | Facility: CLINIC | Age: 69
Discharge: HOME | End: 2024-01-10
Attending: STUDENT IN AN ORGANIZED HEALTH CARE EDUCATION/TRAINING PROGRAM
Payer: MEDICARE

## 2024-01-10 ENCOUNTER — OFFICE VISIT (OUTPATIENT)
Dept: ORTHOPEDICS | Facility: CLINIC | Age: 69
End: 2024-01-10
Payer: MEDICARE

## 2024-01-10 VITALS
WEIGHT: 146 LBS | HEART RATE: 82 BPM | SYSTOLIC BLOOD PRESSURE: 112 MMHG | DIASTOLIC BLOOD PRESSURE: 78 MMHG | HEIGHT: 62 IN | BODY MASS INDEX: 26.87 KG/M2

## 2024-01-10 DIAGNOSIS — S62.101D CLOSED FRACTURE OF RIGHT WRIST WITH ROUTINE HEALING, SUBSEQUENT ENCOUNTER: Primary | ICD-10-CM

## 2024-01-10 DIAGNOSIS — S62.101D CLOSED FRACTURE OF RIGHT WRIST WITH ROUTINE HEALING, SUBSEQUENT ENCOUNTER: ICD-10-CM

## 2024-01-10 PROCEDURE — 99213 OFFICE O/P EST LOW 20 MIN: CPT | Mod: ,,, | Performed by: STUDENT IN AN ORGANIZED HEALTH CARE EDUCATION/TRAINING PROGRAM

## 2024-01-10 PROCEDURE — 73110 X-RAY EXAM OF WRIST: CPT | Mod: RT,,, | Performed by: STUDENT IN AN ORGANIZED HEALTH CARE EDUCATION/TRAINING PROGRAM

## 2024-01-10 RX ORDER — LEVOTHYROXINE SODIUM 88 UG/1
88 TABLET ORAL EVERY MORNING
COMMUNITY
Start: 2023-12-20

## 2024-01-10 NOTE — PROGRESS NOTES
Chief Complaint:  Right wrist injury    Consulting Physician: No ref. provider found    History of present illness:    Patient is a 67-year-old female who presents for a right distal radius fracture we are treating non operatively.  The date of injury was 10/23/2023.  I saw her in my clinic last time where I instructed her to start working on range of motion and sent her to therapy.  She has been going to therapy 3 times a week and has been getting her motion back.  She has no pain today    Past Medical History:   Diagnosis Date    COPD (chronic obstructive pulmonary disease)     Depression     HLD (hyperlipidemia)     Hypertension     PAD (peripheral artery disease)     PVD (peripheral vascular disease)     RLS (restless legs syndrome)        Past Surgical History:   Procedure Laterality Date    COLONOSCOPY N/A 8/5/2022    Procedure: COLONOSCOPY;  Surgeon: Alex Persaud MD;  Location: [facility] ENDOSCOPY;  Service: Gastroenterology;  Laterality: N/A;    ESOPHAGOGASTRODUODENOSCOPY N/A 8/4/2022    Procedure: EGD;  Surgeon: Alex Persaud MD;  Location: [facility] ENDOSCOPY;  Service: Gastroenterology;  Laterality: N/A;    INSERTION OF TUNNELED CENTRAL VENOUS HEMODIALYSIS CATHETER N/A 7/8/2022    Procedure: INSERTION, CATHETER, CENTRAL VENOUS, HEMODIALYSIS, TUNNELED;  Surgeon: Benoit Soliman III, MD;  Location: [facility] CATH LAB;  Service: Peripheral Vascular;  Laterality: N/A;  TUNNEL CATH INSERTION    INSERTION OF TUNNELED CENTRAL VENOUS HEMODIALYSIS CATHETER N/A 7/12/2022    Procedure: INSERTION, CATHETER, CENTRAL VENOUS, HEMODIALYSIS, TUNNELED;  Surgeon: Dejan Myers MD;  Location: [facility] CATH LAB;  Service: Peripheral Vascular;  Laterality: N/A;  TUNNELED CATH EXCHANGE    INTRALUMINAL GASTROINTESTINAL TRACT IMAGING VIA CAPSULE N/A 8/5/2022    Procedure: IMAGING PROCEDURE, GI TRACT, INTRALUMINAL, VIA CAPSULE;  Surgeon: Alex Persaud MD;  Location: [facility] ENDOSCOPY;  Service: Gastroenterology;   Laterality: N/A;  M2A post op       Current Outpatient Medications   Medication Sig    albuterol (PROVENTIL/VENTOLIN HFA) 90 mcg/actuation inhaler SMARTSI Puff(s) By Mouth Every 4 Hours PRN    atorvastatin (LIPITOR) 40 MG tablet Take 40 mg by mouth.    bisacodyL (DULCOLAX) 5 mg EC tablet Take 2 tablets (10 mg total) by mouth daily as needed for Constipation.    buPROPion (WELLBUTRIN SR) 150 MG TBSR 12 hr tablet Take 150 mg by mouth 2 (two) times daily.    co-enzyme Q-10 30 mg capsule Take 100 mg by mouth once daily.    DECARA 1,250 mcg (50,000 unit) capsule Take 50,000 Units by mouth every 7 days.    diazePAM (VALIUM) 5 MG tablet SMARTSI-2 Tablet(s) By Mouth PRN    ELIQUIS 5 mg Tab Take 5 mg by mouth 2 (two) times daily.    EUTHYROX 75 mcg tablet Take 75 mcg by mouth once daily.    ezetimibe (ZETIA) 10 mg tablet Take 10 mg by mouth.    fluticasone-salmeterol diskus inhaler 250-50 mcg Inhale 1 puff into the lungs.    furosemide (LASIX) 20 MG tablet Take by mouth once daily. As needed for edema    levothyroxine (SYNTHROID) 88 MCG tablet Take 88 mcg by mouth every morning.    lisinopriL (PRINIVIL,ZESTRIL) 20 MG tablet Take 20 mg by mouth once daily.    rOPINIRole (REQUIP) 1 MG tablet Take 1 mg by mouth once daily.    rOPINIRole (REQUIP) 3 MG tablet Take 3 mg by mouth once daily.    rosuvastatin (CRESTOR) 40 MG Tab Take 40 mg by mouth nightly.    SPIRIVA WITH HANDIHALER 18 mcg inhalation capsule 1 capsule once daily.    SYMBICORT 160-4.5 mcg/actuation HFAA SMARTSI Puff(s) By Mouth Twice Daily     No current facility-administered medications for this visit.       Review of patient's allergies indicates:   Allergen Reactions    Oxycodone Hives       Family History   Problem Relation Age of Onset    No Known Problems Mother     No Known Problems Father        Social History     Socioeconomic History    Marital status: Single   Tobacco Use    Smoking status: Every Day     Types: Cigarettes    Smokeless tobacco:  "Never   Substance and Sexual Activity    Alcohol use: Yes     Comment: soc.       Review of Systems:    Constitution:   Denies chills, fever, and sweats.  HENT:   Denies headaches or blurry vision.  Cardiovascular:  Denies chest pain or irregular heart beat.  Respiratory:   Denies cough or shortness of breath.  Gastrointestinal:  Denies abdominal pain, nausea, or vomiting.  Musculoskeletal:   Denies muscle cramps.  Neurological:   Denies dizziness or focal weakness.  Psychiatric/Behavior: Normal mental status.  Hematology/Lymph:  Denies bleeding problem or easy bruising/bleeding.  Skin:    Denies rash or suspicious lesions.    Examination:    Vital Signs:    Vitals:    01/10/24 1418   BP: 112/78   Pulse: 82   Weight: 66.2 kg (146 lb)   Height: 5' 2" (1.575 m)         Body mass index is 26.7 kg/m².    Constitution:   Well-developed, well nourished patient in no acute distress.  Neurological:   Alert and oriented x 3 and cooperative to examination.     Psychiatric/Behavior: Normal mental status.  Respiratory:   No shortness of breath.  Eyes:    Extraoccular muscles intact  Skin:    No scars, rash or suspicious lesions.    MSK:   Right upper extremity:  No open wounds or rashes.  No tenderness to palpation around the distal radius.  EPL and FPL are intact.  She is able to make a full fist.  Wrist range of motion is 30° of extension, 30° of flexion, 70° of supination, 70° of pronation.  She is able to make a fist and touch distal palmar crease.  sensation light touch intact in median ulnar radial distribution.  Radial pulse 2 +the hand is warm well perfused    Imaging:   X-ray of the right wrist shows distal radius and distal ulna fractures healing in acceptable alignment for a 68-year-old patient     Assessment:  Right distal radius fracture, right distal ulna fracture    Plan:  She can now start activity as tolerated.  She can discontinue wearing the splint and more aggressively start working on range of motion.  She " can continue therapy for as long as she feels like this is helping.  Once she feels like she is ready to discontinue this and work on her exercises    Follow Up:  As needed  Xray at next visit:  Right wrist

## 2024-04-01 ENCOUNTER — LAB VISIT (OUTPATIENT)
Dept: LAB | Facility: HOSPITAL | Age: 69
End: 2024-04-01
Payer: MEDICARE

## 2024-04-01 DIAGNOSIS — J44.9 VANISHING LUNG: ICD-10-CM

## 2024-04-01 DIAGNOSIS — N18.31 CHRONIC KIDNEY DISEASE (CKD) STAGE G3A/A1, MODERATELY DECREASED GLOMERULAR FILTRATION RATE (GFR) BETWEEN 45-59 ML/MIN/1.73 SQUARE METER AND ALBUMINURIA CREATININE RATIO LESS THAN 30 MG/G: Primary | ICD-10-CM

## 2024-04-01 DIAGNOSIS — I10 ESSENTIAL HYPERTENSION, MALIGNANT: ICD-10-CM

## 2024-04-01 LAB
ALBUMIN SERPL-MCNC: 3.4 G/DL (ref 3.4–4.8)
ALBUMIN/GLOB SERPL: 1 RATIO (ref 1.1–2)
ALP SERPL-CCNC: 99 UNIT/L (ref 40–150)
ALT SERPL-CCNC: 17 UNIT/L (ref 0–55)
AST SERPL-CCNC: 22 UNIT/L (ref 5–34)
BILIRUB SERPL-MCNC: 0.5 MG/DL
BUN SERPL-MCNC: 31.8 MG/DL (ref 9.8–20.1)
CALCIUM SERPL-MCNC: 8.7 MG/DL (ref 8.4–10.2)
CHLORIDE SERPL-SCNC: 105 MMOL/L (ref 98–107)
CO2 SERPL-SCNC: 27 MMOL/L (ref 23–31)
CREAT SERPL-MCNC: 1.28 MG/DL (ref 0.55–1.02)
GFR SERPLBLD CREATININE-BSD FMLA CKD-EPI: 45 MLS/MIN/1.73/M2
GLOBULIN SER-MCNC: 3.3 GM/DL (ref 2.4–3.5)
GLUCOSE SERPL-MCNC: 109 MG/DL (ref 82–115)
POTASSIUM SERPL-SCNC: 3.5 MMOL/L (ref 3.5–5.1)
PROT SERPL-MCNC: 6.7 GM/DL (ref 5.8–7.6)
SODIUM SERPL-SCNC: 140 MMOL/L (ref 136–145)

## 2024-04-01 PROCEDURE — 36415 COLL VENOUS BLD VENIPUNCTURE: CPT

## 2024-04-01 PROCEDURE — 80053 COMPREHEN METABOLIC PANEL: CPT

## 2024-04-19 ENCOUNTER — LAB VISIT (OUTPATIENT)
Dept: LAB | Facility: HOSPITAL | Age: 69
End: 2024-04-19
Attending: INTERNAL MEDICINE
Payer: MEDICARE

## 2024-04-19 DIAGNOSIS — N18.31 CHRONIC KIDNEY DISEASE (CKD) STAGE G3A/A1, MODERATELY DECREASED GLOMERULAR FILTRATION RATE (GFR) BETWEEN 45-59 ML/MIN/1.73 SQUARE METER AND ALBUMINURIA CREATININE RATIO LESS THAN 30 MG/G: ICD-10-CM

## 2024-04-19 DIAGNOSIS — I10 ESSENTIAL HYPERTENSION, MALIGNANT: Primary | ICD-10-CM

## 2024-04-19 DIAGNOSIS — J44.9 CHRONIC OBSTRUCTIVE PULMONARY DISEASE, UNSPECIFIED COPD TYPE: ICD-10-CM

## 2024-04-19 DIAGNOSIS — D50.8 OTHER IRON DEFICIENCY ANEMIAS: ICD-10-CM

## 2024-04-19 LAB
ALBUMIN SERPL-MCNC: 3.7 G/DL (ref 3.4–4.8)
ALBUMIN/GLOB SERPL: 1 RATIO (ref 1.1–2)
ALP SERPL-CCNC: 114 UNIT/L (ref 40–150)
ALT SERPL-CCNC: 16 UNIT/L (ref 0–55)
AST SERPL-CCNC: 21 UNIT/L (ref 5–34)
BILIRUB SERPL-MCNC: 0.7 MG/DL
BUN SERPL-MCNC: 18.9 MG/DL (ref 9.8–20.1)
CALCIUM SERPL-MCNC: 9.3 MG/DL (ref 8.4–10.2)
CHLORIDE SERPL-SCNC: 99 MMOL/L (ref 98–107)
CO2 SERPL-SCNC: 33 MMOL/L (ref 23–31)
CREAT SERPL-MCNC: 1.39 MG/DL (ref 0.55–1.02)
GFR SERPLBLD CREATININE-BSD FMLA CKD-EPI: 41 MLS/MIN/1.73/M2
GLOBULIN SER-MCNC: 3.7 GM/DL (ref 2.4–3.5)
GLUCOSE SERPL-MCNC: 101 MG/DL (ref 82–115)
POTASSIUM SERPL-SCNC: 3 MMOL/L (ref 3.5–5.1)
PROT SERPL-MCNC: 7.4 GM/DL (ref 5.8–7.6)
SODIUM SERPL-SCNC: 141 MMOL/L (ref 136–145)

## 2024-04-19 PROCEDURE — 36415 COLL VENOUS BLD VENIPUNCTURE: CPT

## 2024-04-19 PROCEDURE — 80053 COMPREHEN METABOLIC PANEL: CPT

## 2024-05-16 ENCOUNTER — OFFICE VISIT (OUTPATIENT)
Dept: ORTHOPEDICS | Facility: CLINIC | Age: 69
End: 2024-05-16
Payer: MEDICARE

## 2024-05-16 ENCOUNTER — HOSPITAL ENCOUNTER (OUTPATIENT)
Dept: RADIOLOGY | Facility: CLINIC | Age: 69
Discharge: HOME | End: 2024-05-16
Attending: ORTHOPAEDIC SURGERY
Payer: MEDICARE

## 2024-05-16 VITALS
SYSTOLIC BLOOD PRESSURE: 100 MMHG | BODY MASS INDEX: 26.86 KG/M2 | WEIGHT: 145.94 LBS | HEIGHT: 62 IN | HEART RATE: 91 BPM | DIASTOLIC BLOOD PRESSURE: 69 MMHG

## 2024-05-16 DIAGNOSIS — M25.512 BILATERAL SHOULDER PAIN, UNSPECIFIED CHRONICITY: Primary | ICD-10-CM

## 2024-05-16 DIAGNOSIS — M25.511 BILATERAL SHOULDER PAIN, UNSPECIFIED CHRONICITY: ICD-10-CM

## 2024-05-16 DIAGNOSIS — M75.122 COMPLETE TEAR OF LEFT ROTATOR CUFF, UNSPECIFIED WHETHER TRAUMATIC: ICD-10-CM

## 2024-05-16 DIAGNOSIS — M75.121 COMPLETE TEAR OF RIGHT ROTATOR CUFF, UNSPECIFIED WHETHER TRAUMATIC: ICD-10-CM

## 2024-05-16 DIAGNOSIS — M25.512 BILATERAL SHOULDER PAIN, UNSPECIFIED CHRONICITY: ICD-10-CM

## 2024-05-16 DIAGNOSIS — M25.511 BILATERAL SHOULDER PAIN, UNSPECIFIED CHRONICITY: Primary | ICD-10-CM

## 2024-05-16 PROCEDURE — 73030 X-RAY EXAM OF SHOULDER: CPT | Mod: LT,,, | Performed by: ORTHOPAEDIC SURGERY

## 2024-05-16 PROCEDURE — 73030 X-RAY EXAM OF SHOULDER: CPT | Mod: RT,,, | Performed by: ORTHOPAEDIC SURGERY

## 2024-05-16 PROCEDURE — 99214 OFFICE O/P EST MOD 30 MIN: CPT | Mod: ,,, | Performed by: ORTHOPAEDIC SURGERY

## 2024-05-16 RX ORDER — TIZANIDINE 4 MG/1
4 TABLET ORAL EVERY 6 HOURS PRN
COMMUNITY
Start: 2024-04-03

## 2024-05-16 RX ORDER — GABAPENTIN 100 MG/1
CAPSULE ORAL
COMMUNITY

## 2024-05-16 NOTE — PROGRESS NOTES
"Subjective:    CC: Shoulder Injury (Humphrey shoulder pain- Reports a fall about two weeks ago. Stated is unable to lift rt arm about shoulder and lt shoulder is getting better. Stated has swelling in neck. Denies any numbness. )       HPI:  Patient comes in today for her 1st visit.  She is presently with family.  Patient had a trip and fall injuring both shoulders.  She had no complaints with either shoulder before her fall.  Since then, she has complete loss of her right shoulder, her left shoulder is still better though still has some limited motion.  She denies any neck pain radiculopathy numbness or tingling.  She denies other complaints.  She is right-hand dominant.    ROS: Refer to HPI for pertinent ROS. All other 12 point systems negative.    Objective:  Vitals:    05/16/24 1028   BP: 100/69   Pulse: 91   Weight: 66.2 kg (145 lb 15.1 oz)   Height: 5' 2" (1.575 m)        Physical Exam:  Patient is well-nourished and well-developed, in no apparent distress, pleasant and cooperative. Examination of the right upper extremity compartments are soft and warm.  Skin is intact. There are no signs or symptoms of DVT or infection.   Patient is tender to palpation along the  lateral aspect .  Patient is able to forward flex and abduct to 30°.  Positive Todd and Neers, positive empty can, positive drop arm test. Negative sulcus sign. Stable to stressing. Neurovascularly intact distally.  Examination of the left upper extremity compartments are soft and warm.  Skin is intact. There are no signs or symptoms of DVT or infection.   Patient is tender to palpation along the  anterolateral aspect .  Patient is able to forward flex and abduct to 90.  Positive Todd and Neers, positive empty can, questionable drop arm test. Negative sulcus sign. Stable to stressing. Neurovascularly intact distally.    Images:  X-rays three views left and right shoulder demonstrate no obvious fracture or dislocation. Images Reviewed and discussed " with patient.    Assessment:  1. Bilateral shoulder pain, unspecified chronicity  - X-ray Shoulder 2 or More Views Right; Future  - X-Ray Shoulder 2 or More Views Left; Future    2. Complete tear of left rotator cuff, unspecified whether traumatic  - MRI Shoulder Without Contrast Left    3. Complete tear of right rotator cuff, unspecified whether traumatic  - MRI Shoulder Without Contrast Right        Plan:  At this time we discussed her physical exam and x-ray findings.  I am concerned for incomplete tear of the rotator cuff on the right shoulder, and suspected at least partial tear of the left rotator cuff.  She continues to have pain loss of motion, positive drop-arm.  We will proceed with an MRI of her right and left shoulder.  I would like see her back later this week for her results    Follow UP: No follow-ups on file.

## 2024-05-23 ENCOUNTER — OFFICE VISIT (OUTPATIENT)
Dept: ORTHOPEDICS | Facility: CLINIC | Age: 69
End: 2024-05-23
Payer: MEDICARE

## 2024-05-23 DIAGNOSIS — T14.8XXA BONE BRUISE: Primary | ICD-10-CM

## 2024-05-23 DIAGNOSIS — M75.112 PARTIAL NONTRAUMATIC RUPTURE OF LEFT ROTATOR CUFF: ICD-10-CM

## 2024-05-23 DIAGNOSIS — M75.111 NONTRAUMATIC INCOMPLETE TEAR OF RIGHT ROTATOR CUFF: ICD-10-CM

## 2024-05-23 PROCEDURE — 99213 OFFICE O/P EST LOW 20 MIN: CPT | Mod: ,,, | Performed by: ORTHOPAEDIC SURGERY

## 2024-05-23 NOTE — PROGRESS NOTES
Subjective:    CC: Follow-up of the Left Shoulder and Follow-up of the Right Shoulder (KRISTY shoulder MRI results)       HPI:  Patient returns today for repeat exam.  Patient is currently in a sling to the right upper extremity.  She is presently with family.  Patient had an MRI of the left and right shoulder.  We have discussed results.  She did have an injury to her right shoulder a couple of weeks ago.  She denies any specific dislocation or requirements of reduction.  She denies any previous injuries, she denies other complaints.    ROS: Refer to \[facility] [place] [facility]\"" for pertinent ROS. All other 12 point systems negative.    Objective:  There were no vitals filed for this visit.     Physical Exam:  Patient is well-nourished and well-developed, in no apparent distress, pleasant and cooperative. Examination of the right upper extremity compartments are soft and warm.  Skin is intact. There are no signs or symptoms of DVT or infection.   Patient is tender to palpation along the  anterior aspect .  Patient is able to forward flex and abduct to 0.  Unknown Todd and Neers, unknown empty can, unknown drop arm test. Negative sulcus sign. Stable to stressing. Neurovascularly intact distally.  Examination of the left upper extremity she is able to forward flex and abduct to 140° mildly positive Todd sign negative drop-arm stable to stressing, neurovascular intact distally.    Images:  MRI of the left and right shoulder were reviewed, images were reviewed with the patient. Images Reviewed and discussed with patient.    Assessment:  1. Bone bruise    2. Partial nontraumatic rupture of left rotator cuff    3. Nontraumatic incomplete tear of right rotator cuff        Plan:  At this time we discussed her physical exam and MRI findings.  We will continue conservative treatment, she will continue with the sling for the next couple of weeks.  We have discussed some pendulum exercises, I would like see her back in 3 weeks, if her symptoms  start to improve we will start formal therapy.    Follow UP: No follow-ups on file.

## 2024-06-13 ENCOUNTER — OFFICE VISIT (OUTPATIENT)
Dept: ORTHOPEDICS | Facility: CLINIC | Age: 69
End: 2024-06-13
Payer: MEDICARE

## 2024-06-13 VITALS
DIASTOLIC BLOOD PRESSURE: 65 MMHG | SYSTOLIC BLOOD PRESSURE: 98 MMHG | HEIGHT: 62 IN | HEART RATE: 110 BPM | BODY MASS INDEX: 26.86 KG/M2 | WEIGHT: 145.94 LBS

## 2024-06-13 DIAGNOSIS — M75.111 NONTRAUMATIC INCOMPLETE TEAR OF RIGHT ROTATOR CUFF: ICD-10-CM

## 2024-06-13 DIAGNOSIS — M75.41 SHOULDER IMPINGEMENT SYNDROME, RIGHT: ICD-10-CM

## 2024-06-13 DIAGNOSIS — T14.8XXA BONE BRUISE: ICD-10-CM

## 2024-06-13 DIAGNOSIS — M75.112 PARTIAL NONTRAUMATIC RUPTURE OF LEFT ROTATOR CUFF: Primary | ICD-10-CM

## 2024-06-13 PROCEDURE — 20610 DRAIN/INJ JOINT/BURSA W/O US: CPT | Mod: RT,,, | Performed by: ORTHOPAEDIC SURGERY

## 2024-06-13 PROCEDURE — 99214 OFFICE O/P EST MOD 30 MIN: CPT | Mod: 25,,, | Performed by: ORTHOPAEDIC SURGERY

## 2024-06-13 RX ORDER — LIDOCAINE HYDROCHLORIDE 20 MG/ML
2 INJECTION, SOLUTION INFILTRATION; PERINEURAL
Status: DISCONTINUED | OUTPATIENT
Start: 2024-06-13 | End: 2024-06-13 | Stop reason: HOSPADM

## 2024-06-13 RX ORDER — METHYLPREDNISOLONE ACETATE 80 MG/ML
80 INJECTION, SUSPENSION INTRA-ARTICULAR; INTRALESIONAL; INTRAMUSCULAR; SOFT TISSUE
Status: DISCONTINUED | OUTPATIENT
Start: 2024-06-13 | End: 2024-06-13 | Stop reason: HOSPADM

## 2024-06-13 RX ADMIN — METHYLPREDNISOLONE ACETATE 80 MG: 80 INJECTION, SUSPENSION INTRA-ARTICULAR; INTRALESIONAL; INTRAMUSCULAR; SOFT TISSUE at 10:06

## 2024-06-13 RX ADMIN — LIDOCAINE HYDROCHLORIDE 2 MG: 20 INJECTION, SOLUTION INFILTRATION; PERINEURAL at 10:06

## 2024-06-13 NOTE — PROCEDURES
Large Joint Aspiration/Injection: R subacromial bursa    Date/Time: 6/13/2024 10:45 AM    Performed by: Jase Spaulding MD  Authorized by: Jase Spaulding MD    Consent Done?:  Yes (Verbal)  Indications:  Pain  Site marked: the procedure site was marked    Timeout: prior to procedure the correct patient, procedure, and site was verified    Prep: patient was prepped and draped in usual sterile fashion    Approach:  Posterior  Location:  Shoulder  Site:  R subacromial bursa  Medications:  2 mg LIDOcaine HCL 20 mg/ml (2%) 20 mg/mL (2 %); 80 mg methylPREDNISolone acetate 80 mg/mL  Patient tolerance:  Patient tolerated the procedure well with no immediate complications

## 2024-06-13 NOTE — PROGRESS NOTES
"Subjective:    CC: Follow-up ( f/u for KRISTY shoulders states the right is still bothering her she can't lift it up all the way. Says the left has gotten better she can lift it. Has taken Tylenol which she finds helps a little. Has been doing exercises which she says helps some. )       HPI:  Patient returns today for repeat exam.  Patient states overall her left shoulder is getting better, she continues to have pain about her right shoulder.  She is 4 weeks from her initial injury.  We have discussed her MRI of the left and right shoulder.    ROS: Refer to HPI for pertinent ROS. All other 12 point systems negative.    Objective:  Vitals:    06/13/24 1111   BP: 98/65   BP Location: Left arm   Patient Position: Sitting   BP Method: Medium (Automatic)   Pulse: 110   Weight: 66.2 kg (145 lb 15.1 oz)   Height: 5' 2" (1.575 m)        Physical Exam:  Patient is well-nourished and well-developed, in no apparent distress, pleasant and cooperative. Examination of the right upper extremity compartments are soft and warm.  Skin is intact. There are no signs or symptoms of DVT or infection.   Patient is tender to palpation along the  lateral aspect .  Patient is able to forward flex and abduct to 95°.  Mildly positive Todd and Neers, positive empty can, negative apprehension negative sulcus, negative drop arm test. Negative sulcus sign. Stable to stressing. Neurovascularly intact distally.    Images: . Images Reviewed and discussed with patient.    Assessment:  1. Partial nontraumatic rupture of left rotator cuff    2. Nontraumatic incomplete tear of right rotator cuff  - Large Joint Aspiration/Injection: R subacromial bursa    3. Bone bruise  - Large Joint Aspiration/Injection: R subacromial bursa    4. Shoulder impingement syndrome, right  - Large Joint Aspiration/Injection: R subacromial bursa        Plan:  At this time we discussed her physical exam and previous MRI findings.  We will continue conservative treatment.  She " tolerated a subacromial injection very well to the right shoulder.  We will also start formal therapy to regain some strength and motion.  This is in both shoulders.  I would like see back in 4 weeks to see how she is progressing.    Follow UP: No follow-ups on file.    Large Joint Aspiration/Injection: R subacromial bursa    Date/Time: 6/13/2024 10:45 AM    Performed by: Jase Spaulding MD  Authorized by: Jase Spaulding MD    Consent Done?:  Yes (Verbal)  Indications:  Pain  Site marked: the procedure site was marked    Timeout: prior to procedure the correct patient, procedure, and site was verified    Prep: patient was prepped and draped in usual sterile fashion    Approach:  Posterior  Location:  Shoulder  Site:  R subacromial bursa  Medications:  2 mg LIDOcaine HCL 20 mg/ml (2%) 20 mg/mL (2 %); 80 mg methylPREDNISolone acetate 80 mg/mL  Patient tolerance:  Patient tolerated the procedure well with no immediate complications

## 2024-06-14 ENCOUNTER — LAB VISIT (OUTPATIENT)
Dept: LAB | Facility: HOSPITAL | Age: 69
End: 2024-06-14
Attending: STUDENT IN AN ORGANIZED HEALTH CARE EDUCATION/TRAINING PROGRAM
Payer: MEDICARE

## 2024-06-14 DIAGNOSIS — L30.9 ACUTE DERMATITIS: Primary | ICD-10-CM

## 2024-06-14 DIAGNOSIS — L30.9 DERMATITIS, UNSPECIFIED: ICD-10-CM

## 2024-06-14 LAB
ALBUMIN SERPL-MCNC: 4 G/DL (ref 3.4–4.8)
ALBUMIN/GLOB SERPL: 1 RATIO (ref 1.1–2)
ALP SERPL-CCNC: 117 UNIT/L (ref 40–150)
ALT SERPL-CCNC: 22 UNIT/L (ref 0–55)
ANION GAP SERPL CALC-SCNC: 10 MEQ/L
AST SERPL-CCNC: 28 UNIT/L (ref 5–34)
BACTERIA #/AREA URNS AUTO: ABNORMAL /HPF
BASOPHILS # BLD AUTO: 0.02 X10(3)/MCL
BASOPHILS NFR BLD AUTO: 0.2 %
BILIRUB SERPL-MCNC: 0.6 MG/DL
BILIRUB UR QL STRIP.AUTO: NEGATIVE
BUN SERPL-MCNC: 43.6 MG/DL (ref 9.8–20.1)
CALCIUM SERPL-MCNC: 9.9 MG/DL (ref 8.4–10.2)
CHLORIDE SERPL-SCNC: 101 MMOL/L (ref 98–107)
CLARITY UR: CLEAR
CO2 SERPL-SCNC: 27 MMOL/L (ref 23–31)
COLOR UR AUTO: COLORLESS
CREAT SERPL-MCNC: 1.46 MG/DL (ref 0.55–1.02)
CREAT/UREA NIT SERPL: 30
EOSINOPHIL # BLD AUTO: 0 X10(3)/MCL (ref 0–0.9)
EOSINOPHIL NFR BLD AUTO: 0 %
ERYTHROCYTE [DISTWIDTH] IN BLOOD BY AUTOMATED COUNT: 14.3 % (ref 11.5–17)
GFR SERPLBLD CREATININE-BSD FMLA CKD-EPI: 39 ML/MIN/1.73/M2
GLOBULIN SER-MCNC: 4 GM/DL (ref 2.4–3.5)
GLUCOSE SERPL-MCNC: 102 MG/DL (ref 82–115)
GLUCOSE UR QL STRIP: NORMAL
HCT VFR BLD AUTO: 39.2 % (ref 37–47)
HGB BLD-MCNC: 12.5 G/DL (ref 12–16)
HGB UR QL STRIP: ABNORMAL
HYALINE CASTS #/AREA URNS LPF: ABNORMAL /LPF
IMM GRANULOCYTES # BLD AUTO: 0.05 X10(3)/MCL (ref 0–0.04)
IMM GRANULOCYTES NFR BLD AUTO: 0.5 %
KETONES UR QL STRIP: NEGATIVE
LEUKOCYTE ESTERASE UR QL STRIP: NEGATIVE
LYMPHOCYTES # BLD AUTO: 0.84 X10(3)/MCL (ref 0.6–4.6)
LYMPHOCYTES NFR BLD AUTO: 8.5 %
MCH RBC QN AUTO: 28.8 PG (ref 27–31)
MCHC RBC AUTO-ENTMCNC: 31.9 G/DL (ref 33–36)
MCV RBC AUTO: 90.3 FL (ref 80–94)
MONOCYTES # BLD AUTO: 0.45 X10(3)/MCL (ref 0.1–1.3)
MONOCYTES NFR BLD AUTO: 4.5 %
MUCOUS THREADS URNS QL MICRO: ABNORMAL /LPF
NEUTROPHILS # BLD AUTO: 8.54 X10(3)/MCL (ref 2.1–9.2)
NEUTROPHILS NFR BLD AUTO: 86.3 %
NITRITE UR QL STRIP: NEGATIVE
NRBC BLD AUTO-RTO: 0 %
PH UR STRIP: 5 [PH]
PLATELET # BLD AUTO: 347 X10(3)/MCL (ref 130–400)
PMV BLD AUTO: 9.5 FL (ref 7.4–10.4)
POTASSIUM SERPL-SCNC: 4.6 MMOL/L (ref 3.5–5.1)
PROT SERPL-MCNC: 8 GM/DL (ref 5.8–7.6)
PROT UR QL STRIP: NEGATIVE
RBC # BLD AUTO: 4.34 X10(6)/MCL (ref 4.2–5.4)
RBC #/AREA URNS AUTO: ABNORMAL /HPF
SODIUM SERPL-SCNC: 138 MMOL/L (ref 136–145)
SP GR UR STRIP.AUTO: 1.01 (ref 1–1.03)
SQUAMOUS #/AREA URNS LPF: ABNORMAL /HPF
UROBILINOGEN UR STRIP-ACNC: NORMAL
WBC # BLD AUTO: 9.9 X10(3)/MCL (ref 4.5–11.5)
WBC #/AREA URNS AUTO: ABNORMAL /HPF

## 2024-06-14 PROCEDURE — 36415 COLL VENOUS BLD VENIPUNCTURE: CPT

## 2024-06-14 PROCEDURE — 86235 NUCLEAR ANTIGEN ANTIBODY: CPT

## 2024-06-14 PROCEDURE — 85025 COMPLETE CBC W/AUTO DIFF WBC: CPT | Mod: GA

## 2024-06-14 PROCEDURE — 81001 URINALYSIS AUTO W/SCOPE: CPT

## 2024-06-14 PROCEDURE — 80053 COMPREHEN METABOLIC PANEL: CPT

## 2024-06-17 LAB
ANTINUCLEAR ANTIBODY SCREEN (OHS): NEGATIVE
CENTROMERE QUANT (OHS): 0.5 U/ML
DSDNA AB QUANT (OHS): 11 IU/ML
JO-1 AB QUANT (OHS): <0.3 U/ML
RNP70 AB QUANT (OHS): 3.7 U/ML
SCL-70S AB QUANT (OHS): <0.6 U/ML
SMITH AB QUANT (OHS): <0.7 U/ML
SSA(RO) AB QUANT (OHS): 0.6 U/ML
SSB(LA) AB QUANT (OHS): <0.4 U/ML
U1RNP AB QUANT (OHS): 2.3 U/ML

## 2024-06-18 ENCOUNTER — TELEPHONE (OUTPATIENT)
Dept: ORTHOPEDICS | Facility: CLINIC | Age: 69
End: 2024-06-18
Payer: MEDICARE

## 2024-06-18 NOTE — TELEPHONE ENCOUNTER
Patient called stating that she will be on vacation for one week and will have to miss physical therapy.     She wants to know if she is still able to go on vacation.     Advised that she can ask PT for some exercises to do while she is on vacation.     Pt verbalized understanding and will call with any questions or concerns.

## 2024-07-25 ENCOUNTER — OFFICE VISIT (OUTPATIENT)
Dept: ORTHOPEDICS | Facility: CLINIC | Age: 69
End: 2024-07-25
Payer: MEDICARE

## 2024-07-25 ENCOUNTER — LAB VISIT (OUTPATIENT)
Dept: LAB | Facility: HOSPITAL | Age: 69
End: 2024-07-25
Attending: INTERNAL MEDICINE
Payer: MEDICARE

## 2024-07-25 VITALS
BODY MASS INDEX: 26.86 KG/M2 | DIASTOLIC BLOOD PRESSURE: 73 MMHG | SYSTOLIC BLOOD PRESSURE: 131 MMHG | HEART RATE: 81 BPM | WEIGHT: 145.94 LBS | HEIGHT: 62 IN

## 2024-07-25 DIAGNOSIS — M75.112 PARTIAL NONTRAUMATIC RUPTURE OF LEFT ROTATOR CUFF: Primary | ICD-10-CM

## 2024-07-25 DIAGNOSIS — T14.8XXA BONE BRUISE: ICD-10-CM

## 2024-07-25 DIAGNOSIS — E78.49 FAMILIAL COMBINED HYPERLIPIDEMIA: Primary | ICD-10-CM

## 2024-07-25 DIAGNOSIS — S43.491A BANKART LESION OF RIGHT SHOULDER, INITIAL ENCOUNTER: ICD-10-CM

## 2024-07-25 DIAGNOSIS — N18.31 CHRONIC KIDNEY DISEASE (CKD) STAGE G3A/A1, MODERATELY DECREASED GLOMERULAR FILTRATION RATE (GFR) BETWEEN 45-59 ML/MIN/1.73 SQUARE METER AND ALBUMINURIA CREATININE RATIO LESS THAN 30 MG/G: ICD-10-CM

## 2024-07-25 LAB
ALBUMIN SERPL-MCNC: 3.7 G/DL (ref 3.4–4.8)
ALBUMIN/GLOB SERPL: 1.1 RATIO (ref 1.1–2)
ALP SERPL-CCNC: 105 UNIT/L (ref 40–150)
ALT SERPL-CCNC: 22 UNIT/L (ref 0–55)
ANION GAP SERPL CALC-SCNC: 9 MEQ/L
AST SERPL-CCNC: 27 UNIT/L (ref 5–34)
BILIRUB SERPL-MCNC: 0.6 MG/DL
BUN SERPL-MCNC: 28.5 MG/DL (ref 9.8–20.1)
CALCIUM SERPL-MCNC: 9.4 MG/DL (ref 8.4–10.2)
CHLORIDE SERPL-SCNC: 102 MMOL/L (ref 98–107)
CHOLEST SERPL-MCNC: 141 MG/DL
CHOLEST/HDLC SERPL: 2 {RATIO} (ref 0–5)
CO2 SERPL-SCNC: 31 MMOL/L (ref 23–31)
CREAT SERPL-MCNC: 1.18 MG/DL (ref 0.55–1.02)
CREAT UR-MCNC: 33.9 MG/DL (ref 45–106)
CREAT/UREA NIT SERPL: 24
GFR SERPLBLD CREATININE-BSD FMLA CKD-EPI: 50 ML/MIN/1.73/M2
GLOBULIN SER-MCNC: 3.4 GM/DL (ref 2.4–3.5)
GLUCOSE SERPL-MCNC: 98 MG/DL (ref 82–115)
HDLC SERPL-MCNC: 74 MG/DL (ref 35–60)
LDLC SERPL CALC-MCNC: 55 MG/DL (ref 50–140)
MAGNESIUM SERPL-MCNC: 2 MG/DL (ref 1.6–2.6)
PHOSPHATE SERPL-MCNC: 3.3 MG/DL (ref 2.3–4.7)
POTASSIUM SERPL-SCNC: 3.7 MMOL/L (ref 3.5–5.1)
PROT SERPL-MCNC: 7.1 GM/DL (ref 5.8–7.6)
PROT UR STRIP-MCNC: <6.8 MG/DL
SODIUM SERPL-SCNC: 142 MMOL/L (ref 136–145)
TRIGL SERPL-MCNC: 59 MG/DL (ref 37–140)
URATE SERPL-MCNC: 5.7 MG/DL (ref 2.6–6)
VLDLC SERPL CALC-MCNC: 12 MG/DL

## 2024-07-25 PROCEDURE — 84550 ASSAY OF BLOOD/URIC ACID: CPT

## 2024-07-25 PROCEDURE — 80061 LIPID PANEL: CPT

## 2024-07-25 PROCEDURE — 84156 ASSAY OF PROTEIN URINE: CPT

## 2024-07-25 PROCEDURE — 83735 ASSAY OF MAGNESIUM: CPT

## 2024-07-25 PROCEDURE — 36415 COLL VENOUS BLD VENIPUNCTURE: CPT

## 2024-07-25 PROCEDURE — 84100 ASSAY OF PHOSPHORUS: CPT

## 2024-07-25 PROCEDURE — 80053 COMPREHEN METABOLIC PANEL: CPT

## 2024-07-25 PROCEDURE — 99213 OFFICE O/P EST LOW 20 MIN: CPT | Mod: ,,, | Performed by: ORTHOPAEDIC SURGERY

## 2024-07-25 PROCEDURE — 82570 ASSAY OF URINE CREATININE: CPT

## 2024-07-25 NOTE — PROGRESS NOTES
"Subjective:    CC: Follow-up (Humphrey shoulders rt injection 6/13/24- Reports injection did not help out longer than a week. Stated PT has been rough but has helped gain some ROM back. Denies numbness or tingling. Is taking tylenol 500 mg but only eases pain.  )       HPI:  Patient returns today for repeat exam.  Patient is 2+ months from her right shoulder injury.  She states she is doing well, she has been going to physical therapy making good progress.  Patient has minimal discomfort of the left shoulder.    ROS: Refer to HPI for pertinent ROS. All other 12 point systems negative.    Objective:  Vitals:    07/25/24 0900   BP: 131/73   Pulse: 81   Weight: 66.2 kg (145 lb 15.1 oz)   Height: 5' 2" (1.575 m)        Physical Exam:  Patient is well-nourished and well-developed, in no apparent distress, pleasant and cooperative. Examination of the right upper extremity compartments are soft and warm.  Skin is intact. There are no signs or symptoms of DVT or infection.   Patient is tender to palpation along the  lateral aspect .  Patient is able to forward flex and abduct to 130.  Mildly positive Todd and Neers, mildly positive empty can, negative drop arm test. Negative sulcus sign. Stable to stressing. Neurovascularly intact distally.  Patient has some stiffness with internal rotation.    Images: . Images Reviewed and discussed with patient.    Assessment:  1. Partial nontraumatic rupture of left rotator cuff  - Ambulatory referral/consult to Physical/Occupational Therapy; Future    2. Bone bruise  - Ambulatory referral/consult to Physical/Occupational Therapy; Future    3. Bankart lesion of right shoulder, initial encounter  - Ambulatory referral/consult to Physical/Occupational Therapy; Future        Plan:  At this time we discussed her physical exam and previous imaging findings.  Patient is making good progress her right shoulder injury.  She will continue physical therapy to regain her full strength and motion.  We " have discussed internal rotation, I would like see him back in 4 weeks to see how she is progressing.    Follow UP: No follow-ups on file.

## 2024-09-05 ENCOUNTER — OFFICE VISIT (OUTPATIENT)
Dept: ORTHOPEDICS | Facility: CLINIC | Age: 69
End: 2024-09-05
Payer: MEDICARE

## 2024-09-05 VITALS — HEIGHT: 62 IN | BODY MASS INDEX: 26.86 KG/M2 | WEIGHT: 145.94 LBS

## 2024-09-05 DIAGNOSIS — S43.491A BANKART LESION OF RIGHT SHOULDER, INITIAL ENCOUNTER: Primary | ICD-10-CM

## 2024-09-05 DIAGNOSIS — M75.82 ROTATOR CUFF TENDONITIS, LEFT: ICD-10-CM

## 2024-09-05 DIAGNOSIS — M75.111 NONTRAUMATIC INCOMPLETE TEAR OF RIGHT ROTATOR CUFF: ICD-10-CM

## 2024-09-05 DIAGNOSIS — M75.41 SHOULDER IMPINGEMENT SYNDROME, RIGHT: ICD-10-CM

## 2024-09-05 RX ORDER — METHYLPREDNISOLONE ACETATE 80 MG/ML
80 INJECTION, SUSPENSION INTRA-ARTICULAR; INTRALESIONAL; INTRAMUSCULAR; SOFT TISSUE
Status: DISCONTINUED | OUTPATIENT
Start: 2024-09-05 | End: 2024-09-05 | Stop reason: HOSPADM

## 2024-09-05 RX ORDER — LIDOCAINE HYDROCHLORIDE 20 MG/ML
2 INJECTION, SOLUTION INFILTRATION; PERINEURAL
Status: DISCONTINUED | OUTPATIENT
Start: 2024-09-05 | End: 2024-09-05 | Stop reason: HOSPADM

## 2024-09-05 RX ADMIN — LIDOCAINE HYDROCHLORIDE 2 MG: 20 INJECTION, SOLUTION INFILTRATION; PERINEURAL at 08:09

## 2024-09-05 RX ADMIN — METHYLPREDNISOLONE ACETATE 80 MG: 80 INJECTION, SUSPENSION INTRA-ARTICULAR; INTRALESIONAL; INTRAMUSCULAR; SOFT TISSUE at 08:09

## 2024-09-05 NOTE — PROGRESS NOTES
Subjective:    CC: Follow-up of the Left Shoulder (Bilateral shoulder pain - R shoulder inj 6/13/24 - pt states that the shot did help her shoulder. She is PT. ) and Follow-up of the Right Shoulder       HPI:  Patient presents to clinic for repeat evaluation of bilateral shoulder pain.  Initially her right shoulder was worse but states it is doing great today.  She had a steroid injection on 06/13/2024 that were helped well.  Today she states her left shoulder insert most bothersome.  She has had bilateral MRI of shoulders.  She is attending formal physical therapy with good progress.  She feels that her left shoulder is overall progressing but slower secondary to plate onset of therapy to the left shoulder.  Utilizing Tylenol when needed as she is on Eliquis.  No new complaints.  r shoulder doing grest, FROM; let worse now; . Tylenol; eliquis, attending PT with prgoress    ROS: Refer to HPI for pertinent ROS. All other 12 point systems negative.    Past Medical History:   Diagnosis Date    COPD (chronic obstructive pulmonary disease)     Depression     HLD (hyperlipidemia)     Hypertension     PAD (peripheral artery disease)     PVD (peripheral vascular disease)     RLS (restless legs syndrome)         Past Surgical History:   Procedure Laterality Date    COLONOSCOPY N/A 8/5/2022    Procedure: COLONOSCOPY;  Surgeon: Alex Persaud MD;  Location: [facility] ENDOSCOPY;  Service: Gastroenterology;  Laterality: N/A;    ESOPHAGOGASTRODUODENOSCOPY N/A 8/4/2022    Procedure: EGD;  Surgeon: Alex Persaud MD;  Location: [facility] ENDOSCOPY;  Service: Gastroenterology;  Laterality: N/A;    INSERTION OF TUNNELED CENTRAL VENOUS HEMODIALYSIS CATHETER N/A 7/8/2022    Procedure: INSERTION, CATHETER, CENTRAL VENOUS, HEMODIALYSIS, TUNNELED;  Surgeon: Benoit Soliman III, MD;  Location: [facility] CATH LAB;  Service: Peripheral Vascular;  Laterality: N/A;  TUNNEL CATH INSERTION    INSERTION OF TUNNELED CENTRAL VENOUS  HEMODIALYSIS CATHETER N/A 2022    Procedure: INSERTION, CATHETER, CENTRAL VENOUS, HEMODIALYSIS, TUNNELED;  Surgeon: Dejan Myers MD;  Location: [facility] CATH LAB;  Service: Peripheral Vascular;  Laterality: N/A;  TUNNELED CATH EXCHANGE    INTRALUMINAL GASTROINTESTINAL TRACT IMAGING VIA CAPSULE N/A 2022    Procedure: IMAGING PROCEDURE, GI TRACT, INTRALUMINAL, VIA CAPSULE;  Surgeon: Alex Persaud MD;  Location: [facility] ENDOSCOPY;  Service: Gastroenterology;  Laterality: N/A;  M2A post op        Current Outpatient Medications on File Prior to Visit   Medication Sig Dispense Refill    albuterol (PROVENTIL/VENTOLIN HFA) 90 mcg/actuation inhaler SMARTSI Puff(s) By Mouth Every 4 Hours PRN      atorvastatin (LIPITOR) 40 MG tablet Take 40 mg by mouth.      bisacodyL (DULCOLAX) 5 mg EC tablet Take 2 tablets (10 mg total) by mouth daily as needed for Constipation. 30 tablet 01    buPROPion (WELLBUTRIN SR) 150 MG TBSR 12 hr tablet Take 150 mg by mouth 2 (two) times daily.      co-enzyme Q-10 30 mg capsule Take 100 mg by mouth once daily.      DECARA 1,250 mcg (50,000 unit) capsule Take 50,000 Units by mouth every 7 days.      diazePAM (VALIUM) 5 MG tablet SMARTSI-2 Tablet(s) By Mouth PRN      ELIQUIS 5 mg Tab Take 5 mg by mouth 2 (two) times daily.      EUTHYROX 75 mcg tablet Take 75 mcg by mouth once daily.      ezetimibe (ZETIA) 10 mg tablet Take 10 mg by mouth.      fluticasone-salmeterol diskus inhaler 250-50 mcg Inhale 1 puff into the lungs.      furosemide (LASIX) 20 MG tablet Take by mouth once daily. As needed for edema (Patient not taking: Reported on 2024)      gabapentin (NEURONTIN) 100 MG capsule Take by mouth.      levothyroxine (SYNTHROID) 88 MCG tablet Take 88 mcg by mouth every morning.      lisinopriL (PRINIVIL,ZESTRIL) 20 MG tablet Take 20 mg by mouth once daily.      rOPINIRole (REQUIP) 1 MG tablet Take 1 mg by mouth once daily.      rOPINIRole (REQUIP) 3 MG tablet Take 3 mg by  mouth once daily.      rosuvastatin (CRESTOR) 40 MG Tab Take 40 mg by mouth nightly.      SPIRIVA WITH HANDIHALER 18 mcg inhalation capsule 1 capsule once daily.      SYMBICORT 160-4.5 mcg/actuation AA SMARTSI Puff(s) By Mouth Twice Daily (Patient not taking: Reported on 2024)      tiZANidine (ZANAFLEX) 4 MG tablet Take 4 mg by mouth every 6 (six) hours as needed.      [DISCONTINUED] amLODIPine (NORVASC) 5 MG tablet Take 5 mg by mouth once daily.      [DISCONTINUED] aspirin (ECOTRIN) 81 MG EC tablet Take 81 mg by mouth.      [DISCONTINUED] clopidogreL (PLAVIX) 75 mg tablet Take 75 mg by mouth once daily.       No current facility-administered medications on file prior to visit.        Review of patient's allergies indicates:   Allergen Reactions    Oxycodone Hives       Objective:    There were no vitals filed for this visit.     Physical Exam:  Patient is well-nourished and well-developed, in no apparent distress, pleasant and cooperative. Examination of the bilateral upper extremity compartments are soft and warm.  Skin is intact. There are no signs or symptoms of DVT or infection.   Patient is tender to palpation along the  anterior lateral shoulder aspect bilaterally; mildly to the right and moderately to the left .  Patient is able to forward flex and abduct to 140° to the left shoulder; 180° to the right shoulder.  Positive left shoulder Todd and Neers, left shoulder positive empty can, negative bilateral drop arm test. Negative sulcus sign. Stable to stressing. Neurovascularly intact distally.    Images:  Previous bilateral shoulder MRI Images Reviewed and discussed with patient.    Assessment:  1. Bankart lesion of right shoulder, initial encounter    2. Shoulder impingement syndrome, right  - Ambulatory referral/consult to Physical/Occupational Therapy; Future    3. Nontraumatic incomplete tear of right rotator cuff  - Ambulatory referral/consult to Physical/Occupational Therapy; Future    4.  Rotator cuff tendonitis, left  - Large Joint Aspiration/Injection: L subacromial bursa  - LIDOcaine HCL 20 mg/ml (2%) injection 2 mg  - methylPREDNISolone acetate injection 80 mg  - Ambulatory referral/consult to Physical/Occupational Therapy; Future       Plan:  Physical exam and previous imaging findings again discussed with patient.  Her right shoulder has done well with steroid injection and physical therapy.  Range of motion has returned and pain is minimal.  As for her left shoulder, we will proceed with a subacromial steroid injection today in clinic.  She tolerated this well.  She will continue formal physical therapy to work on progressive crusting range of motion and strength.  We have discussed prescription anti-inflammatories however given her blood thinner use she will continue with Tylenol only at this time.  I would like to see the patient back in 3 months to assess her progress.  Patient understands to call sooner with any problems or difficulties.    Follow up: Follow up in about 3 months (around 12/5/2024).    Large Joint Aspiration/Injection: L subacromial bursa    Date/Time: 9/5/2024 8:45 AM    Performed by: Elisa Chowdary PA-C  Authorized by: Elisa Chowdary PA-C    Consent Done?:  Yes (Verbal)  Indications:  Pain  Site marked: the procedure site was marked    Timeout: prior to procedure the correct patient, procedure, and site was verified    Prep: patient was prepped and draped in usual sterile fashion    Approach:  Posterior  Location:  Shoulder  Site:  L subacromial bursa  Medications:  2 mg LIDOcaine HCL 20 mg/ml (2%) 20 mg/mL (2 %); 80 mg methylPREDNISolone acetate 80 mg/mL  Patient tolerance:  Patient tolerated the procedure well with no immediate complications

## 2024-09-05 NOTE — PROCEDURES
Large Joint Aspiration/Injection: L subacromial bursa    Date/Time: 9/5/2024 8:45 AM    Performed by: Elisa Chowdary PA-C  Authorized by: Elisa Chowdary PA-C    Consent Done?:  Yes (Verbal)  Indications:  Pain  Site marked: the procedure site was marked    Timeout: prior to procedure the correct patient, procedure, and site was verified    Prep: patient was prepped and draped in usual sterile fashion    Approach:  Posterior  Location:  Shoulder  Site:  L subacromial bursa  Medications:  2 mg LIDOcaine HCL 20 mg/ml (2%) 20 mg/mL (2 %); 80 mg methylPREDNISolone acetate 80 mg/mL  Patient tolerance:  Patient tolerated the procedure well with no immediate complications

## 2024-12-05 ENCOUNTER — OFFICE VISIT (OUTPATIENT)
Dept: ORTHOPEDICS | Facility: CLINIC | Age: 69
End: 2024-12-05
Payer: MEDICARE

## 2024-12-05 VITALS
DIASTOLIC BLOOD PRESSURE: 67 MMHG | HEART RATE: 84 BPM | WEIGHT: 145.94 LBS | HEIGHT: 62 IN | SYSTOLIC BLOOD PRESSURE: 102 MMHG | BODY MASS INDEX: 26.86 KG/M2

## 2024-12-05 DIAGNOSIS — M75.112 PARTIAL NONTRAUMATIC RUPTURE OF LEFT ROTATOR CUFF: Primary | ICD-10-CM

## 2024-12-05 DIAGNOSIS — M25.812 IMPINGEMENT OF LEFT SHOULDER: ICD-10-CM

## 2024-12-05 RX ORDER — LIDOCAINE HYDROCHLORIDE 20 MG/ML
2 INJECTION, SOLUTION INFILTRATION; PERINEURAL
Status: DISCONTINUED | OUTPATIENT
Start: 2024-12-05 | End: 2024-12-05 | Stop reason: HOSPADM

## 2024-12-05 RX ORDER — METHYLPREDNISOLONE ACETATE 80 MG/ML
80 INJECTION, SUSPENSION INTRA-ARTICULAR; INTRALESIONAL; INTRAMUSCULAR; SOFT TISSUE
Status: DISCONTINUED | OUTPATIENT
Start: 2024-12-05 | End: 2024-12-05 | Stop reason: HOSPADM

## 2024-12-05 RX ADMIN — LIDOCAINE HYDROCHLORIDE 2 MG: 20 INJECTION, SOLUTION INFILTRATION; PERINEURAL at 09:12

## 2024-12-05 RX ADMIN — METHYLPREDNISOLONE ACETATE 80 MG: 80 INJECTION, SUSPENSION INTRA-ARTICULAR; INTRALESIONAL; INTRAMUSCULAR; SOFT TISSUE at 09:12

## 2024-12-05 NOTE — PROGRESS NOTES
"Subjective:    CC: Follow-up (L shoulder - last cortisone injection 9/5/24 with relief. Reports due to weather change pain has started to increase. Possible injection today. She did a few sessions of PT. )       HPI:  Patient returns today for repeat exam.  Patient continues to have some pain in certain positions of her left shoulder.  We have discussed her previous imaging.  Patient states her main complaint is the left shoulder, right is doing well.    ROS: Refer to [facility] [place] for pertinent ROS. All other 12 point systems negative.    Objective:  Vitals:    12/05/24 0857   BP: 102/67   BP Location: Left arm   Patient Position: Sitting   Pulse: 84   Weight: 66.2 kg (145 lb 15.1 oz)   Height: 5' 2" (1.575 m)        Physical Exam:  Patient is well-nourished and well-developed, in no apparent distress, pleasant and cooperative. Examination of the left upper extremity compartments are soft and warm.  Skin is intact. There are no signs or symptoms of DVT or infection.   Patient is tender to palpation along the  lateral aspect .  Patient is able to forward flex and abduct to 130.  Mildly positive Todd and Neers, negative empty can, negative drop arm test. Negative sulcus sign. Stable to stressing. Neurovascularly intact distally.    Images:  Previous imaging were reviewed. Images Reviewed and discussed with patient.    Assessment:  1. Partial nontraumatic rupture of left rotator cuff  - Large Joint Aspiration/Injection: L subacromial bursa    2. Impingement of left shoulder  - Large Joint Aspiration/Injection: L subacromial bursa        Plan:  At this time we discussed her physical exam and previous imaging findings.  We have discussed some shoulder range of motion strengthening exercises.  She tolerated a subacromial injection very well to left shoulder.  I would like see back in 3 months if needed.    Follow UP: No follow-ups on file.    Large Joint Aspiration/Injection: L subacromial bursa    Date/Time: 12/5/2024 9:00 " AM    Performed by: Jase Spaulding MD  Authorized by: Jase Spaulding MD    Consent Done?:  Yes (Verbal)  Indications:  Pain  Site marked: the procedure site was marked    Timeout: prior to procedure the correct patient, procedure, and site was verified    Prep: patient was prepped and draped in usual sterile fashion    Approach:  Posterior  Location:  Shoulder  Site:  L subacromial bursa  Medications:  2 mg LIDOcaine HCL 20 mg/ml (2%) 20 mg/mL (2 %); 80 mg methylPREDNISolone acetate 80 mg/mL  Patient tolerance:  Patient tolerated the procedure well with no immediate complications

## 2024-12-05 NOTE — PROCEDURES
Large Joint Aspiration/Injection: L subacromial bursa    Date/Time: 12/5/2024 9:00 AM    Performed by: Jase Spaulding MD  Authorized by: Jase Spaulding MD    Consent Done?:  Yes (Verbal)  Indications:  Pain  Site marked: the procedure site was marked    Timeout: prior to procedure the correct patient, procedure, and site was verified    Prep: patient was prepped and draped in usual sterile fashion    Approach:  Posterior  Location:  Shoulder  Site:  L subacromial bursa  Medications:  2 mg LIDOcaine HCL 20 mg/ml (2%) 20 mg/mL (2 %); 80 mg methylPREDNISolone acetate 80 mg/mL  Patient tolerance:  Patient tolerated the procedure well with no immediate complications

## 2025-02-05 ENCOUNTER — LAB VISIT (OUTPATIENT)
Dept: LAB | Facility: HOSPITAL | Age: 70
End: 2025-02-05
Attending: INTERNAL MEDICINE
Payer: MEDICARE

## 2025-02-05 DIAGNOSIS — J44.9 VANISHING LUNG: ICD-10-CM

## 2025-02-05 DIAGNOSIS — D50.8 IRON DEFICIENCY ANEMIA SECONDARY TO INADEQUATE DIETARY IRON INTAKE: ICD-10-CM

## 2025-02-05 DIAGNOSIS — I10 ESSENTIAL HYPERTENSION, MALIGNANT: ICD-10-CM

## 2025-02-05 DIAGNOSIS — N18.31 CHRONIC KIDNEY DISEASE (CKD) STAGE G3A/A1, MODERATELY DECREASED GLOMERULAR FILTRATION RATE (GFR) BETWEEN 45-59 ML/MIN/1.73 SQUARE METER AND ALBUMINURIA CREATININE RATIO LESS THAN 30 MG/G: Primary | ICD-10-CM

## 2025-02-05 LAB
ALBUMIN SERPL-MCNC: 3.7 G/DL (ref 3.4–4.8)
ALBUMIN/GLOB SERPL: 0.9 RATIO (ref 1.1–2)
ALP SERPL-CCNC: 129 UNIT/L (ref 40–150)
ALT SERPL-CCNC: 15 UNIT/L (ref 0–55)
ANION GAP SERPL CALC-SCNC: 8 MEQ/L
AST SERPL-CCNC: 20 UNIT/L (ref 5–34)
BILIRUB SERPL-MCNC: 0.4 MG/DL
BUN SERPL-MCNC: 24 MG/DL (ref 9.8–20.1)
CALCIUM SERPL-MCNC: 9.4 MG/DL (ref 8.4–10.2)
CHLORIDE SERPL-SCNC: 104 MMOL/L (ref 98–107)
CO2 SERPL-SCNC: 29 MMOL/L (ref 23–31)
CREAT SERPL-MCNC: 1.55 MG/DL (ref 0.55–1.02)
CREAT UR-MCNC: 16.1 MG/DL (ref 45–106)
CREAT/UREA NIT SERPL: 15
GFR SERPLBLD CREATININE-BSD FMLA CKD-EPI: 36 ML/MIN/1.73/M2
GLOBULIN SER-MCNC: 4 GM/DL (ref 2.4–3.5)
GLUCOSE SERPL-MCNC: 106 MG/DL (ref 82–115)
MAGNESIUM SERPL-MCNC: 2.1 MG/DL (ref 1.6–2.6)
PHOSPHATE SERPL-MCNC: 3.7 MG/DL (ref 2.3–4.7)
POTASSIUM SERPL-SCNC: 4.2 MMOL/L (ref 3.5–5.1)
PROT SERPL-MCNC: 7.7 GM/DL (ref 5.8–7.6)
PROT UR STRIP-MCNC: <6.8 MG/DL
SODIUM SERPL-SCNC: 141 MMOL/L (ref 136–145)
URATE SERPL-MCNC: 8 MG/DL (ref 2.6–6)

## 2025-02-05 PROCEDURE — 82570 ASSAY OF URINE CREATININE: CPT

## 2025-02-05 PROCEDURE — 84550 ASSAY OF BLOOD/URIC ACID: CPT

## 2025-02-05 PROCEDURE — 83735 ASSAY OF MAGNESIUM: CPT

## 2025-02-05 PROCEDURE — 36415 COLL VENOUS BLD VENIPUNCTURE: CPT

## 2025-02-05 PROCEDURE — 80053 COMPREHEN METABOLIC PANEL: CPT

## 2025-02-05 PROCEDURE — 84100 ASSAY OF PHOSPHORUS: CPT

## 2025-03-17 ENCOUNTER — OFFICE VISIT (OUTPATIENT)
Dept: ORTHOPEDICS | Facility: CLINIC | Age: 70
End: 2025-03-17
Payer: MEDICARE

## 2025-03-17 VITALS
WEIGHT: 147 LBS | SYSTOLIC BLOOD PRESSURE: 135 MMHG | HEART RATE: 77 BPM | DIASTOLIC BLOOD PRESSURE: 78 MMHG | HEIGHT: 62 IN | BODY MASS INDEX: 27.05 KG/M2

## 2025-03-17 DIAGNOSIS — M75.112 PARTIAL NONTRAUMATIC RUPTURE OF LEFT ROTATOR CUFF: Primary | ICD-10-CM

## 2025-03-17 DIAGNOSIS — M25.812 IMPINGEMENT OF LEFT SHOULDER: ICD-10-CM

## 2025-03-17 DIAGNOSIS — M75.22 LEFT BICIPITAL TENOSYNOVITIS: ICD-10-CM

## 2025-03-17 PROCEDURE — 20610 DRAIN/INJ JOINT/BURSA W/O US: CPT | Mod: LT,,,

## 2025-03-17 PROCEDURE — 99214 OFFICE O/P EST MOD 30 MIN: CPT | Mod: 25,,,

## 2025-03-17 RX ORDER — LIDOCAINE HYDROCHLORIDE 20 MG/ML
2 INJECTION, SOLUTION INFILTRATION; PERINEURAL
Status: DISCONTINUED | OUTPATIENT
Start: 2025-03-17 | End: 2025-03-24 | Stop reason: HOSPADM

## 2025-03-17 RX ORDER — METHYLPREDNISOLONE ACETATE 80 MG/ML
80 INJECTION, SUSPENSION INTRA-ARTICULAR; INTRALESIONAL; INTRAMUSCULAR; SOFT TISSUE
Status: DISCONTINUED | OUTPATIENT
Start: 2025-03-17 | End: 2025-03-24 | Stop reason: HOSPADM

## 2025-03-17 RX ADMIN — LIDOCAINE HYDROCHLORIDE 2 MG: 20 INJECTION, SOLUTION INFILTRATION; PERINEURAL at 10:03

## 2025-03-17 RX ADMIN — METHYLPREDNISOLONE ACETATE 80 MG: 80 INJECTION, SUSPENSION INTRA-ARTICULAR; INTRALESIONAL; INTRAMUSCULAR; SOFT TISSUE at 10:03

## 2025-03-17 NOTE — PROGRESS NOTES
Subjective:    CC: Injections (Lt shoulder inj 12/5/24 - Would like discuss alternatives. Patient reports some limited range of motions. Reports discomfort when laying down, pain radiates from should down into chest. )       History of Present Illness    HPI:  Patient presents for a repeat evaluation of her left shoulder status post subacromial steroid injection on 12/05/2024. She reports limited motion and discomfort in her left shoulder, particularly when laying down, with pain radiating down from the shoulder. Pain occurs when tucking in her shirt, during internal rotation, raising her arm, and pushing up against resistance. She describes the pain as localized to the front of the shoulder, with increased tenderness in the biceps tendon area.    She had a previous left shoulder MRI on 05/16/2024, which revealed tendinosis of the supraspinatus, infraspinatus, teres minor, and subscapularis, as well as biceps tendinosis, but no obvious rotator cuff tear. Patient received a shot in her shoulder from Dr. Spaulding during her last visit due to ongoing discomfort. When asked about the effectiveness of the injections, she states that it is currently causing discomfort.    Patient confirms having undergone physical therapy in the past, primarily for her hand, during which they also worked on her shoulders. She acknowledges trying to avoid heavy lifting but admits that she sometimes has to perform such tasks when necessary.    She is currently taking Eliquis as a anticoagulant and can only take Tylenol for pain relief.    Patient denies having diabetes.    PREVIOUS TREATMENTS:  Patient received a subacromial steroid injection on 12/05/2024. She also had a previous steroid injection to her neck and shoulder, which provided some relief. She underwent physical therapy for her hand, during which her shoulders were also worked on.          ROS: Refer to HPI for pertinent ROS. All other 12 point systems negative.    Past Medical  History:   Diagnosis Date    COPD (chronic obstructive pulmonary disease)     Depression     HLD (hyperlipidemia)     Hypertension     PAD (peripheral artery disease)     PVD (peripheral vascular disease)     RLS (restless legs syndrome)         Past Surgical History:   Procedure Laterality Date    COLONOSCOPY N/A 8/5/2022    Procedure: COLONOSCOPY;  Surgeon: Alex Persaud MD;  Location: [facility] ENDOSCOPY;  Service: Gastroenterology;  Laterality: N/A;    ESOPHAGOGASTRODUODENOSCOPY N/A 8/4/2022    Procedure: EGD;  Surgeon: Alex Persaud MD;  Location: [facility] ENDOSCOPY;  Service: Gastroenterology;  Laterality: N/A;    INSERTION OF TUNNELED CENTRAL VENOUS HEMODIALYSIS CATHETER N/A 7/8/2022    Procedure: INSERTION, CATHETER, CENTRAL VENOUS, HEMODIALYSIS, TUNNELED;  Surgeon: Benoit Soliman III, MD;  Location: [facility] CATH LAB;  Service: Peripheral Vascular;  Laterality: N/A;  TUNNEL CATH INSERTION    INSERTION OF TUNNELED CENTRAL VENOUS HEMODIALYSIS CATHETER N/A 7/12/2022    Procedure: INSERTION, CATHETER, CENTRAL VENOUS, HEMODIALYSIS, TUNNELED;  Surgeon: Dejan Myers MD;  Location: [facility] CATH LAB;  Service: Peripheral Vascular;  Laterality: N/A;  TUNNELED CATH EXCHANGE    INTRALUMINAL GASTROINTESTINAL TRACT IMAGING VIA CAPSULE N/A 8/5/2022    Procedure: IMAGING PROCEDURE, GI TRACT, INTRALUMINAL, VIA CAPSULE;  Surgeon: Alex Persaud MD;  Location: [facility] ENDOSCOPY;  Service: Gastroenterology;  Laterality: N/A;  M2A post op        Medications Ordered Prior to Encounter[1]     Review of patient's allergies indicates:   Allergen Reactions    Oxycodone Hives       Objective:    Vitals:    03/17/25 1038   BP: 135/78   Pulse: 77        Physical Exam: Examination of the left upper extremity compartments are soft and warm. Skin is intact. There are no signs or symptoms of DVT or infection. Patient is tender to palpation along the lateral aspect . Patient is able to forward flex and abduct to 140.  Mildly positive Todd and Neers, mildly positive empty can, negative drop arm test. Negative o'briens. Negative sulcus sign. Stable to stressing. Neurovascularly intact distally.     Images:  Previous MRI Images Reviewed and discussed with patient.    Assessment:  1. Partial nontraumatic rupture of left rotator cuff  - Ambulatory Referral/Consult to Physical Therapy; Future  - Large Joint Aspiration/Injection: L subacromial bursa  - LIDOcaine HCL 20 mg/ml (2%) injection 2 mg  - methylPREDNISolone acetate injection 80 mg    2. Impingement of left shoulder  - Ambulatory Referral/Consult to Physical Therapy; Future    3. Left bicipital tenosynovitis  - Ambulatory Referral/Consult to Physical Therapy; Future  - Large Joint Aspiration/Injection: L subacromial bursa  - LIDOcaine HCL 20 mg/ml (2%) injection 2 mg  - methylPREDNISolone acetate injection 80 mg       Plan:  Assessment & Plan    LEFT SHOULDER PAIN AND CALCIFIC TENDINITIS:   Left shoulder steroid injection administered today.   Avoid heavy lifting or repetitive motions that aggravate shoulder.   Take Tylenol 500mg as needed for pain.   Referred to PT for improving motion and strength.    FOLLOW-UP:   Follow up in 3 months.          Follow up: Follow up in about 3 months (around 6/17/2025).    Large Joint Aspiration/Injection: L subacromial bursa    Date/Time: 3/17/2025 10:00 AM    Performed by: Elisa Chowdary PA-C  Authorized by: Elisa Chowdary PA-C    Consent Done?:  Yes (Verbal)  Indications:  Pain  Site marked: the procedure site was marked    Timeout: prior to procedure the correct patient, procedure, and site was verified    Prep: patient was prepped and draped in usual sterile fashion    Approach:  Posterior  Location:  Shoulder  Site:  L subacromial bursa  Medications:  2 mg LIDOcaine HCL 20 mg/ml (2%) 20 mg/mL (2 %); 80 mg methylPREDNISolone acetate 80 mg/mL  Patient tolerance:  Patient tolerated the procedure well with no immediate  complications       This note was generated with the assistance of ambient listening technology. Verbal consent was obtained by the patient and accompanying visitor(s) for the recording of patient appointment to facilitate this note. I attest to having reviewed and edited the generated note for accuracy, though some syntax or spelling errors may persist. Please contact the author of this note for any clarification.            [1]   Current Outpatient Medications on File Prior to Visit   Medication Sig Dispense Refill    albuterol (PROVENTIL/VENTOLIN HFA) 90 mcg/actuation inhaler SMARTSI Puff(s) By Mouth Every 4 Hours PRN      atorvastatin (LIPITOR) 40 MG tablet Take 40 mg by mouth.      bisacodyL (DULCOLAX) 5 mg EC tablet Take 2 tablets (10 mg total) by mouth daily as needed for Constipation. 30 tablet 01    buPROPion (WELLBUTRIN SR) 150 MG TBSR 12 hr tablet Take 150 mg by mouth 2 (two) times daily.      co-enzyme Q-10 30 mg capsule Take 100 mg by mouth once daily.      DECARA 1,250 mcg (50,000 unit) capsule Take 50,000 Units by mouth every 7 days.      diazePAM (VALIUM) 5 MG tablet SMARTSI-2 Tablet(s) By Mouth PRN      ELIQUIS 5 mg Tab Take 5 mg by mouth 2 (two) times daily.      EUTHYROX 75 mcg tablet Take 75 mcg by mouth once daily.      ezetimibe (ZETIA) 10 mg tablet Take 10 mg by mouth.      fluticasone-salmeterol diskus inhaler 250-50 mcg Inhale 1 puff into the lungs.      furosemide (LASIX) 20 MG tablet Take by mouth once daily. As needed for edema      gabapentin (NEURONTIN) 100 MG capsule Take by mouth.      levothyroxine (SYNTHROID) 88 MCG tablet Take 88 mcg by mouth every morning.      lisinopriL (PRINIVIL,ZESTRIL) 20 MG tablet Take 20 mg by mouth once daily.      rOPINIRole (REQUIP) 1 MG tablet Take 1 mg by mouth once daily.      rOPINIRole (REQUIP) 3 MG tablet Take 3 mg by mouth once daily.      rosuvastatin (CRESTOR) 40 MG Tab Take 40 mg by mouth nightly.      SPIRIVA WITH HANDIHALER 18 mcg  inhalation capsule 1 capsule once daily.      SYMBICORT 160-4.5 mcg/actuation HFAA       tiZANidine (ZANAFLEX) 4 MG tablet Take 4 mg by mouth every 6 (six) hours as needed.      [DISCONTINUED] amLODIPine (NORVASC) 5 MG tablet Take 5 mg by mouth once daily.      [DISCONTINUED] aspirin (ECOTRIN) 81 MG EC tablet Take 81 mg by mouth.      [DISCONTINUED] clopidogreL (PLAVIX) 75 mg tablet Take 75 mg by mouth once daily.       No current facility-administered medications on file prior to visit.

## 2025-03-17 NOTE — PROCEDURES
Large Joint Aspiration/Injection: L subacromial bursa    Date/Time: 3/17/2025 10:00 AM    Performed by: Elisa Chowdary PA-C  Authorized by: Elisa Chowdary PA-C    Consent Done?:  Yes (Verbal)  Indications:  Pain  Site marked: the procedure site was marked    Timeout: prior to procedure the correct patient, procedure, and site was verified    Prep: patient was prepped and draped in usual sterile fashion    Approach:  Posterior  Location:  Shoulder  Site:  L subacromial bursa  Medications:  2 mg LIDOcaine HCL 20 mg/ml (2%) 20 mg/mL (2 %); 80 mg methylPREDNISolone acetate 80 mg/mL  Patient tolerance:  Patient tolerated the procedure well with no immediate complications

## 2025-03-27 ENCOUNTER — LAB VISIT (OUTPATIENT)
Dept: LAB | Facility: HOSPITAL | Age: 70
End: 2025-03-27
Attending: NURSE PRACTITIONER
Payer: MEDICARE

## 2025-03-27 DIAGNOSIS — R73.09 IMPAIRED GLUCOSE TOLERANCE TEST: ICD-10-CM

## 2025-03-27 DIAGNOSIS — E03.9 MYXEDEMA HEART DISEASE: ICD-10-CM

## 2025-03-27 DIAGNOSIS — I51.9 MYXEDEMA HEART DISEASE: ICD-10-CM

## 2025-03-27 DIAGNOSIS — E55.9 AVITAMINOSIS D: Primary | ICD-10-CM

## 2025-03-27 LAB
25(OH)D3+25(OH)D2 SERPL-MCNC: 36 NG/ML (ref 30–80)
ALBUMIN SERPL-MCNC: 3.8 G/DL (ref 3.4–4.8)
ALBUMIN/GLOB SERPL: 1 RATIO (ref 1.1–2)
ALP SERPL-CCNC: 129 UNIT/L (ref 40–150)
ALT SERPL-CCNC: 19 UNIT/L (ref 0–55)
ANION GAP SERPL CALC-SCNC: 11 MEQ/L
AST SERPL-CCNC: 22 UNIT/L (ref 11–45)
BILIRUB SERPL-MCNC: 0.6 MG/DL
BUN SERPL-MCNC: 35.1 MG/DL (ref 9.8–20.1)
CALCIUM SERPL-MCNC: 9 MG/DL (ref 8.4–10.2)
CHLORIDE SERPL-SCNC: 104 MMOL/L (ref 98–107)
CO2 SERPL-SCNC: 26 MMOL/L (ref 23–31)
CREAT SERPL-MCNC: 1.59 MG/DL (ref 0.55–1.02)
CREAT/UREA NIT SERPL: 22
EST. AVERAGE GLUCOSE BLD GHB EST-MCNC: 128.4 MG/DL
GFR SERPLBLD CREATININE-BSD FMLA CKD-EPI: 35 ML/MIN/1.73/M2
GLOBULIN SER-MCNC: 3.8 GM/DL (ref 2.4–3.5)
GLUCOSE SERPL-MCNC: 129 MG/DL (ref 82–115)
HBA1C MFR BLD: 6.1 %
POTASSIUM SERPL-SCNC: 4.2 MMOL/L (ref 3.5–5.1)
PROT SERPL-MCNC: 7.6 GM/DL (ref 5.8–7.6)
SODIUM SERPL-SCNC: 141 MMOL/L (ref 136–145)
T3FREE SERPL-MCNC: 2.31 PG/ML (ref 1.58–3.91)
T4 FREE SERPL-MCNC: 1.19 NG/DL (ref 0.7–1.48)
TSH SERPL-ACNC: 8.86 UIU/ML (ref 0.35–4.94)

## 2025-03-27 PROCEDURE — 80053 COMPREHEN METABOLIC PANEL: CPT

## 2025-03-27 PROCEDURE — 84443 ASSAY THYROID STIM HORMONE: CPT

## 2025-03-27 PROCEDURE — 84481 FREE ASSAY (FT-3): CPT

## 2025-03-27 PROCEDURE — 36415 COLL VENOUS BLD VENIPUNCTURE: CPT

## 2025-03-27 PROCEDURE — 84439 ASSAY OF FREE THYROXINE: CPT

## 2025-03-27 PROCEDURE — 82306 VITAMIN D 25 HYDROXY: CPT

## 2025-03-27 PROCEDURE — 83036 HEMOGLOBIN GLYCOSYLATED A1C: CPT

## 2025-06-18 ENCOUNTER — LAB VISIT (OUTPATIENT)
Dept: LAB | Facility: HOSPITAL | Age: 70
End: 2025-06-18
Attending: INTERNAL MEDICINE
Payer: MEDICARE

## 2025-06-18 DIAGNOSIS — I10 ESSENTIAL HYPERTENSION, MALIGNANT: ICD-10-CM

## 2025-06-18 DIAGNOSIS — J44.9 VANISHING LUNG: ICD-10-CM

## 2025-06-18 DIAGNOSIS — D50.8 IRON DEFICIENCY ANEMIA SECONDARY TO INADEQUATE DIETARY IRON INTAKE: ICD-10-CM

## 2025-06-18 DIAGNOSIS — N18.31 CHRONIC KIDNEY DISEASE (CKD) STAGE G3A/A1, MODERATELY DECREASED GLOMERULAR FILTRATION RATE (GFR) BETWEEN 45-59 ML/MIN/1.73 SQUARE METER AND ALBUMINURIA CREATININE RATIO LESS THAN 30 MG/G: Primary | ICD-10-CM

## 2025-06-18 LAB
ALBUMIN SERPL-MCNC: 3 G/DL (ref 3.4–4.8)
ALBUMIN/GLOB SERPL: 0.8 RATIO (ref 1.1–2)
ALP SERPL-CCNC: 112 UNIT/L (ref 40–150)
ALT SERPL-CCNC: 12 UNIT/L (ref 0–55)
ANION GAP SERPL CALC-SCNC: 7 MEQ/L
AST SERPL-CCNC: 16 UNIT/L (ref 11–45)
BILIRUB SERPL-MCNC: 0.5 MG/DL
BUN SERPL-MCNC: 18.6 MG/DL (ref 9.8–20.1)
CALCIUM SERPL-MCNC: 8.3 MG/DL (ref 8.4–10.2)
CHLORIDE SERPL-SCNC: 108 MMOL/L (ref 98–107)
CO2 SERPL-SCNC: 27 MMOL/L (ref 23–31)
CREAT SERPL-MCNC: 1.22 MG/DL (ref 0.55–1.02)
CREAT UR-MCNC: 321.5 MG/DL (ref 45–106)
CREAT/UREA NIT SERPL: 15
GFR SERPLBLD CREATININE-BSD FMLA CKD-EPI: 48 ML/MIN/1.73/M2
GLOBULIN SER-MCNC: 3.8 GM/DL (ref 2.4–3.5)
GLUCOSE SERPL-MCNC: 166 MG/DL (ref 82–115)
MAGNESIUM SERPL-MCNC: 1.9 MG/DL (ref 1.6–2.6)
PHOSPHATE SERPL-MCNC: 1.7 MG/DL (ref 2.3–4.7)
POTASSIUM SERPL-SCNC: 3.3 MMOL/L (ref 3.5–5.1)
PROT SERPL-MCNC: 6.8 GM/DL (ref 5.8–7.6)
PROT UR STRIP-MCNC: 48.3 MG/DL
SODIUM SERPL-SCNC: 142 MMOL/L (ref 136–145)
URATE SERPL-MCNC: 6.5 MG/DL (ref 2.6–6)
URINE PROTEIN/CREATININE RATIO (OLG): 0.2

## 2025-06-18 PROCEDURE — 36415 COLL VENOUS BLD VENIPUNCTURE: CPT

## 2025-06-18 PROCEDURE — 82570 ASSAY OF URINE CREATININE: CPT

## 2025-06-18 PROCEDURE — 84550 ASSAY OF BLOOD/URIC ACID: CPT

## 2025-06-18 PROCEDURE — 83735 ASSAY OF MAGNESIUM: CPT

## 2025-06-18 PROCEDURE — 80053 COMPREHEN METABOLIC PANEL: CPT

## 2025-06-18 PROCEDURE — 84100 ASSAY OF PHOSPHORUS: CPT

## 2025-06-23 ENCOUNTER — OFFICE VISIT (OUTPATIENT)
Dept: ORTHOPEDICS | Facility: CLINIC | Age: 70
End: 2025-06-23
Payer: MEDICARE

## 2025-06-23 VITALS
WEIGHT: 147.06 LBS | HEIGHT: 62 IN | HEART RATE: 79 BPM | BODY MASS INDEX: 27.06 KG/M2 | DIASTOLIC BLOOD PRESSURE: 58 MMHG | SYSTOLIC BLOOD PRESSURE: 113 MMHG

## 2025-06-23 DIAGNOSIS — G56.22 CUBITAL TUNNEL SYNDROME ON LEFT: ICD-10-CM

## 2025-06-23 DIAGNOSIS — M75.82 ROTATOR CUFF TENDONITIS, LEFT: ICD-10-CM

## 2025-06-23 DIAGNOSIS — M75.22 LEFT BICIPITAL TENOSYNOVITIS: Primary | ICD-10-CM

## 2025-06-23 PROCEDURE — 99213 OFFICE O/P EST LOW 20 MIN: CPT | Mod: ,,,

## 2025-06-23 RX ORDER — BRIMONIDINE TARTRATE 2 MG/ML
SOLUTION/ DROPS OPHTHALMIC
COMMUNITY

## 2025-06-23 RX ORDER — DICLOFENAC SODIUM 1 MG/ML
SOLUTION/ DROPS OPHTHALMIC
COMMUNITY
Start: 2025-05-11

## 2025-06-23 RX ORDER — DORZOLAMIDE HCL 20 MG/ML
SOLUTION/ DROPS OPHTHALMIC
COMMUNITY

## 2025-06-23 NOTE — PROGRESS NOTES
Subjective:    CC: Follow-up (Last injection given 3/17/25 lt shoulder- Stated last injection helped a lot of the pain. Not having pain currently. Has numbness in the fingertip. Is ambulating with walker. IS taking tylenol when pain occurs which has helped. )       History of Present Illness    HPI:  Patient presents for a follow-up evaluation of her left shoulder post subacromial steroid injection administered on 03/17/2025. She reports the injection has significantly helped, stating that she denies any pain since the last injection. She is currently pain-free in her shoulder.    She reports intermittent and positional numbness in her fingertips, particularly in the second through fourth digits, occurring when she leans on her elbow and resolving when pressure is removed. She states that this numbness has increased recently, stating that it has become more frequent and noticeable than before. Denies any numbness or tingling in office today.    She is currently taking Tylenol as needed for pain management, which she reports is effective. She is also on Eliquis, a blood thinner.    She denies constant numbness or tingling in fingers.    PREVIOUS TREATMENTS:  Patient received a subacromial steroid injection in her left shoulder on 03/17/2025, which provided significant benefit.          ROS: Refer to HPI for pertinent ROS. All other 12 point systems negative.    Past Medical History:   Diagnosis Date    COPD (chronic obstructive pulmonary disease)     Depression     HLD (hyperlipidemia)     Hypertension     PAD (peripheral artery disease)     PVD (peripheral vascular disease)     RLS (restless legs syndrome)         Past Surgical History:   Procedure Laterality Date    COLONOSCOPY N/A 8/5/2022    Procedure: COLONOSCOPY;  Surgeon: Alex Persaud MD;  Location: [facility] ENDOSCOPY;  Service: Gastroenterology;  Laterality: N/A;    ESOPHAGOGASTRODUODENOSCOPY N/A 8/4/2022    Procedure: EGD;  Surgeon: Alex ASKEW  MD Cori;  Location: [facility] ENDOSCOPY;  Service: Gastroenterology;  Laterality: N/A;    INSERTION OF TUNNELED CENTRAL VENOUS HEMODIALYSIS CATHETER N/A 7/8/2022    Procedure: INSERTION, CATHETER, CENTRAL VENOUS, HEMODIALYSIS, TUNNELED;  Surgeon: Benoit Soliman III, MD;  Location: [facility] CATH LAB;  Service: Peripheral Vascular;  Laterality: N/A;  TUNNEL CATH INSERTION    INSERTION OF TUNNELED CENTRAL VENOUS HEMODIALYSIS CATHETER N/A 7/12/2022    Procedure: INSERTION, CATHETER, CENTRAL VENOUS, HEMODIALYSIS, TUNNELED;  Surgeon: Dejan Myers MD;  Location: [facility] CATH LAB;  Service: Peripheral Vascular;  Laterality: N/A;  TUNNELED CATH EXCHANGE    INTRALUMINAL GASTROINTESTINAL TRACT IMAGING VIA CAPSULE N/A 8/5/2022    Procedure: IMAGING PROCEDURE, GI TRACT, INTRALUMINAL, VIA CAPSULE;  Surgeon: Alex Persaud MD;  Location: [facility] ENDOSCOPY;  Service: Gastroenterology;  Laterality: N/A;  M2A post op        Medications Ordered Prior to Encounter[1]     Review of patient's allergies indicates:   Allergen Reactions    Oxycodone Hives       Objective:    Vitals:    06/23/25 1112   BP: (!) 113/58   Pulse: 79        Physical Exam:  Patient is well-nourished and well-developed, in no apparent distress, pleasant and cooperative. Examination of the left upper extremity compartments are soft and warm.  Skin is intact. There are no signs or symptoms of DVT or infection.   Patient is non tender to palpation along the  shoulder .  Patient is able to forward flex and abduct to 180.  Negative Todd and Neers, negative empty can, negative drop arm test. Negative sulcus sign. Stable to stressing. Neurovascularly intact distally.    Left wrist and elbow exam:  No numbness or tingling today.  Wrist Supination and pronation to 90 degrees.  Wrist flexion to 90 degrees and wrist extension to 70 degrees.  Full range of motion of the elbow.  Negative Tinel's and Phalen's test.  Negative Finkelstein´s test.  Negative CMC grind.  Neurovascularly intact distally.    Images:  Previous Images Reviewed and discussed with patient.    Assessment:  1. Left bicipital tenosynovitis    2. Rotator cuff tendonitis, left    3. Cubital tunnel syndrome on left       Plan:  Assessment & Plan    LEFT SHOULDER RTC TENDONITIS AND PAIN:   Discussed previous right subacromial steroid injection on 2025.   Stay active with shoulder range of motion exercises.   Take Tylenol as needed for pain.   Contact the office if shoulder pain returns.   Contact the office if steroid injection is needed for shoulder pain.    LEFT ULNAR NEUROPATHY :   Believe this is positional: Avoid leaning on elbow to prevent numbness in fingers.   Contact the office if numbness/tingling in fingers becomes more pronounced or occurs daily.   -Discussed EMG         Follow up: Follow up if symptoms worsen or fail to improve.          This note was generated with the assistance of ambient listening technology. Verbal consent was obtained by the patient and accompanying visitor(s) for the recording of patient appointment to facilitate this note. I attest to having reviewed and edited the generated note for accuracy, though some syntax or spelling errors may persist. Please contact the author of this note for any clarification.            [1]   Current Outpatient Medications on File Prior to Visit   Medication Sig Dispense Refill    albuterol (PROVENTIL/VENTOLIN HFA) 90 mcg/actuation inhaler SMARTSI Puff(s) By Mouth Every 4 Hours PRN      atorvastatin (LIPITOR) 40 MG tablet Take 40 mg by mouth.      bisacodyL (DULCOLAX) 5 mg EC tablet Take 2 tablets (10 mg total) by mouth daily as needed for Constipation. 30 tablet 01    brimonidine 0.2% (ALPHAGAN) 0.2 % Drop Ophthalmic for 20 Days      buPROPion (WELLBUTRIN SR) 150 MG TBSR 12 hr tablet Take 150 mg by mouth 2 (two) times daily.      co-enzyme Q-10 30 mg capsule Take 100 mg by mouth once daily.      DECARA 1,250 mcg (50,000 unit) capsule  Take 50,000 Units by mouth every 7 days.      diazePAM (VALIUM) 5 MG tablet SMARTSI-2 Tablet(s) By Mouth PRN      diclofenac (VOLTAREN) 0.1 % ophthalmic solution Place into both eyes.      dorzolamide (TRUSOPT) 2 % ophthalmic solution Ophthalmic for 40 Days      ELIQUIS 5 mg Tab Take 5 mg by mouth 2 (two) times daily.      EUTHYROX 75 mcg tablet Take 75 mcg by mouth once daily.      ezetimibe (ZETIA) 10 mg tablet Take 10 mg by mouth.      fluticasone-salmeterol diskus inhaler 250-50 mcg Inhale 1 puff into the lungs.      furosemide (LASIX) 20 MG tablet Take by mouth once daily. As needed for edema      gabapentin (NEURONTIN) 100 MG capsule Take by mouth.      levothyroxine (SYNTHROID) 88 MCG tablet Take 88 mcg by mouth every morning.      lisinopriL (PRINIVIL,ZESTRIL) 20 MG tablet Take 20 mg by mouth once daily.      rOPINIRole (REQUIP) 1 MG tablet Take 1 mg by mouth once daily.      rOPINIRole (REQUIP) 3 MG tablet Take 3 mg by mouth once daily.      rosuvastatin (CRESTOR) 40 MG Tab Take 40 mg by mouth nightly.      SPIRIVA WITH HANDIHALER 18 mcg inhalation capsule 1 capsule once daily.      SYMBICORT 160-4.5 mcg/actuation HFAA       tiZANidine (ZANAFLEX) 4 MG tablet Take 4 mg by mouth every 6 (six) hours as needed.      [DISCONTINUED] amLODIPine (NORVASC) 5 MG tablet Take 5 mg by mouth once daily.      [DISCONTINUED] aspirin (ECOTRIN) 81 MG EC tablet Take 81 mg by mouth.      [DISCONTINUED] clopidogreL (PLAVIX) 75 mg tablet Take 75 mg by mouth once daily.       No current facility-administered medications on file prior to visit.

## 2025-06-26 ENCOUNTER — LAB VISIT (OUTPATIENT)
Dept: LAB | Facility: HOSPITAL | Age: 70
End: 2025-06-26
Attending: NURSE PRACTITIONER
Payer: MEDICARE

## 2025-06-26 DIAGNOSIS — R73.09 ABNORMAL BLOOD SUGAR: ICD-10-CM

## 2025-06-26 DIAGNOSIS — E03.9 HYPOTHYROIDISM, UNSPECIFIED TYPE: Primary | ICD-10-CM

## 2025-06-26 LAB
ALBUMIN SERPL-MCNC: 3.3 G/DL (ref 3.4–4.8)
ALBUMIN/GLOB SERPL: 0.8 RATIO (ref 1.1–2)
ALP SERPL-CCNC: 111 UNIT/L (ref 40–150)
ALT SERPL-CCNC: 14 UNIT/L (ref 0–55)
ANION GAP SERPL CALC-SCNC: 9 MEQ/L
AST SERPL-CCNC: 17 UNIT/L (ref 11–45)
BILIRUB SERPL-MCNC: 0.5 MG/DL
BUN SERPL-MCNC: 31.6 MG/DL (ref 9.8–20.1)
CALCIUM SERPL-MCNC: 8.6 MG/DL (ref 8.4–10.2)
CHLORIDE SERPL-SCNC: 105 MMOL/L (ref 98–107)
CO2 SERPL-SCNC: 29 MMOL/L (ref 23–31)
CREAT SERPL-MCNC: 1.48 MG/DL (ref 0.55–1.02)
CREAT/UREA NIT SERPL: 21
EST. AVERAGE GLUCOSE BLD GHB EST-MCNC: 128.4 MG/DL
GFR SERPLBLD CREATININE-BSD FMLA CKD-EPI: 38 ML/MIN/1.73/M2
GLOBULIN SER-MCNC: 4 GM/DL (ref 2.4–3.5)
GLUCOSE SERPL-MCNC: 90 MG/DL (ref 82–115)
HBA1C MFR BLD: 6.1 %
POTASSIUM SERPL-SCNC: 3.2 MMOL/L (ref 3.5–5.1)
PROT SERPL-MCNC: 7.3 GM/DL (ref 5.8–7.6)
SODIUM SERPL-SCNC: 143 MMOL/L (ref 136–145)
T3FREE SERPL-MCNC: 2.51 PG/ML (ref 1.58–3.91)
T4 FREE SERPL-MCNC: 1.14 NG/DL (ref 0.7–1.48)
TSH SERPL-ACNC: 2.7 UIU/ML (ref 0.35–4.94)

## 2025-06-26 PROCEDURE — 36415 COLL VENOUS BLD VENIPUNCTURE: CPT

## 2025-06-26 PROCEDURE — 84439 ASSAY OF FREE THYROXINE: CPT

## 2025-06-26 PROCEDURE — 83036 HEMOGLOBIN GLYCOSYLATED A1C: CPT

## 2025-06-26 PROCEDURE — 84481 FREE ASSAY (FT-3): CPT

## 2025-06-26 PROCEDURE — 80053 COMPREHEN METABOLIC PANEL: CPT

## 2025-06-26 PROCEDURE — 84443 ASSAY THYROID STIM HORMONE: CPT

## (undated) DEVICE — Device

## (undated) DEVICE — GUIDEWIRE STF .035X180CM ANG

## (undated) DEVICE — CATH STRAIGHT GLIDE

## (undated) DEVICE — CONTAINER SPECIMEN STRL 4OZ

## (undated) DEVICE — TRAP SUCTION POLYP

## (undated) DEVICE — TUBING O2 FEMALE CONN 13FT

## (undated) DEVICE — SUT 2-0 ETHILON 18 FS

## (undated) DEVICE — ADHESIVE DERMABOND ADVANCED

## (undated) DEVICE — KIT CANIST SUCTION 1200CC

## (undated) DEVICE — AMPLATZER.035 SUPER STIFF INT

## (undated) DEVICE — SNARE CAPTIFLEX MOF 27X2.4X28

## (undated) DEVICE — ADAPTER DUAL NSL LUER M-M 7FT

## (undated) DEVICE — FORCEP HEMOSTAT MOSQUITO STR

## (undated) DEVICE — TIP SUCTION YANKAUER

## (undated) DEVICE — KIT SURGICAL COLON .25 1.1OZ

## (undated) DEVICE — SOL IRRI STRL WATER 1000ML

## (undated) DEVICE — UNDERPAD DISPOSABLE 30X30IN

## (undated) DEVICE — SUT MCRYL PLUS 4-0 PS2 27IN

## (undated) DEVICE — FORCEP BIOPSY ENDO 2.8MM 240CM

## (undated) DEVICE — PILLCAM SB3 EX EXTENDED

## (undated) DEVICE — BLOCK BLOX BITE DENT RIM 54FR

## (undated) DEVICE — COLLECTION SPECIMEN NEPTUNE

## (undated) DEVICE — DEVICE BASIXCOMPAK INFL 20ML